# Patient Record
Sex: MALE | Race: WHITE | NOT HISPANIC OR LATINO | Employment: OTHER | URBAN - METROPOLITAN AREA
[De-identification: names, ages, dates, MRNs, and addresses within clinical notes are randomized per-mention and may not be internally consistent; named-entity substitution may affect disease eponyms.]

---

## 2022-12-29 ENCOUNTER — APPOINTMENT (EMERGENCY)
Dept: RADIOLOGY | Facility: HOSPITAL | Age: 82
End: 2022-12-29

## 2022-12-29 ENCOUNTER — APPOINTMENT (INPATIENT)
Dept: RADIOLOGY | Facility: HOSPITAL | Age: 82
End: 2022-12-29

## 2022-12-29 ENCOUNTER — HOSPITAL ENCOUNTER (INPATIENT)
Facility: HOSPITAL | Age: 82
LOS: 5 days | Discharge: NURSING, REHAB OR LONG-TERM CARE | End: 2023-01-03
Attending: EMERGENCY MEDICINE | Admitting: INTERNAL MEDICINE

## 2022-12-29 DIAGNOSIS — L03.90 CELLULITIS: Primary | ICD-10-CM

## 2022-12-29 DIAGNOSIS — R26.9 GAIT DIFFICULTY: ICD-10-CM

## 2022-12-29 PROBLEM — N40.0 BPH (BENIGN PROSTATIC HYPERPLASIA): Status: ACTIVE | Noted: 2022-12-29

## 2022-12-29 PROBLEM — Z79.4 TYPE 2 DIABETES MELLITUS, WITH LONG-TERM CURRENT USE OF INSULIN (HCC): Status: ACTIVE | Noted: 2022-12-29

## 2022-12-29 PROBLEM — E11.9 TYPE 2 DIABETES MELLITUS, WITH LONG-TERM CURRENT USE OF INSULIN (HCC): Status: ACTIVE | Noted: 2022-12-29

## 2022-12-29 PROBLEM — I10 HTN (HYPERTENSION): Status: ACTIVE | Noted: 2022-12-29

## 2022-12-29 PROBLEM — E78.5 DYSLIPIDEMIA: Status: ACTIVE | Noted: 2022-12-29

## 2022-12-29 PROBLEM — J98.4 CHRONIC LUNG DISEASE: Status: ACTIVE | Noted: 2022-12-29

## 2022-12-29 PROBLEM — I25.10 CAD (CORONARY ARTERY DISEASE): Status: ACTIVE | Noted: 2022-12-29

## 2022-12-29 PROBLEM — N18.9 CKD (CHRONIC KIDNEY DISEASE): Status: ACTIVE | Noted: 2022-12-29

## 2022-12-29 LAB
ALBUMIN SERPL BCP-MCNC: 3.4 G/DL (ref 3.5–5)
ALP SERPL-CCNC: 135 U/L (ref 46–116)
ALT SERPL W P-5'-P-CCNC: 17 U/L (ref 12–78)
ANION GAP SERPL CALCULATED.3IONS-SCNC: 11 MMOL/L (ref 4–13)
AST SERPL W P-5'-P-CCNC: 16 U/L (ref 5–45)
BACTERIA UR QL AUTO: NORMAL /HPF
BASOPHILS # BLD AUTO: 0.03 THOUSANDS/ÂΜL (ref 0–0.1)
BASOPHILS NFR BLD AUTO: 0 % (ref 0–1)
BILIRUB SERPL-MCNC: 0.92 MG/DL (ref 0.2–1)
BILIRUB UR QL STRIP: NEGATIVE
BUN SERPL-MCNC: 31 MG/DL (ref 5–25)
CALCIUM ALBUM COR SERPL-MCNC: 10 MG/DL (ref 8.3–10.1)
CALCIUM SERPL-MCNC: 9.5 MG/DL (ref 8.3–10.1)
CHLORIDE SERPL-SCNC: 101 MMOL/L (ref 96–108)
CLARITY UR: CLEAR
CO2 SERPL-SCNC: 28 MMOL/L (ref 21–32)
COLOR UR: YELLOW
CREAT SERPL-MCNC: 2.01 MG/DL (ref 0.6–1.3)
CRP SERPL QL: 151.3 MG/L
EOSINOPHIL # BLD AUTO: 0.1 THOUSAND/ÂΜL (ref 0–0.61)
EOSINOPHIL NFR BLD AUTO: 1 % (ref 0–6)
ERYTHROCYTE [DISTWIDTH] IN BLOOD BY AUTOMATED COUNT: 13.4 % (ref 11.6–15.1)
GFR SERPL CREATININE-BSD FRML MDRD: 30 ML/MIN/1.73SQ M
GLUCOSE SERPL-MCNC: 134 MG/DL (ref 65–140)
GLUCOSE SERPL-MCNC: 189 MG/DL (ref 65–140)
GLUCOSE SERPL-MCNC: 234 MG/DL (ref 65–140)
GLUCOSE UR STRIP-MCNC: NEGATIVE MG/DL
HCT VFR BLD AUTO: 47.5 % (ref 36.5–49.3)
HGB BLD-MCNC: 15.2 G/DL (ref 12–17)
HGB UR QL STRIP.AUTO: NEGATIVE
IMM GRANULOCYTES # BLD AUTO: 0.05 THOUSAND/UL (ref 0–0.2)
IMM GRANULOCYTES NFR BLD AUTO: 1 % (ref 0–2)
KETONES UR STRIP-MCNC: NEGATIVE MG/DL
LACTATE SERPL-SCNC: 1.9 MMOL/L (ref 0.5–2)
LEUKOCYTE ESTERASE UR QL STRIP: NEGATIVE
LYMPHOCYTES # BLD AUTO: 0.94 THOUSANDS/ÂΜL (ref 0.6–4.47)
LYMPHOCYTES NFR BLD AUTO: 9 % (ref 14–44)
MCH RBC QN AUTO: 28.6 PG (ref 26.8–34.3)
MCHC RBC AUTO-ENTMCNC: 32 G/DL (ref 31.4–37.4)
MCV RBC AUTO: 89 FL (ref 82–98)
MONOCYTES # BLD AUTO: 0.85 THOUSAND/ÂΜL (ref 0.17–1.22)
MONOCYTES NFR BLD AUTO: 8 % (ref 4–12)
NEUTROPHILS # BLD AUTO: 8.83 THOUSANDS/ÂΜL (ref 1.85–7.62)
NEUTS SEG NFR BLD AUTO: 81 % (ref 43–75)
NITRITE UR QL STRIP: NEGATIVE
NON-SQ EPI CELLS URNS QL MICRO: NORMAL /HPF
NRBC BLD AUTO-RTO: 0 /100 WBCS
PH UR STRIP.AUTO: 6 [PH]
PLATELET # BLD AUTO: 262 THOUSANDS/UL (ref 149–390)
PMV BLD AUTO: 11.2 FL (ref 8.9–12.7)
POTASSIUM SERPL-SCNC: 4.1 MMOL/L (ref 3.5–5.3)
PROT SERPL-MCNC: 8.7 G/DL (ref 6.4–8.4)
PROT UR STRIP-MCNC: ABNORMAL MG/DL
RBC # BLD AUTO: 5.32 MILLION/UL (ref 3.88–5.62)
RBC #/AREA URNS AUTO: NORMAL /HPF
SARS-COV-2 RNA RESP QL NAA+PROBE: NEGATIVE
SODIUM SERPL-SCNC: 140 MMOL/L (ref 135–147)
SP GR UR STRIP.AUTO: 1.02 (ref 1–1.03)
URATE SERPL-MCNC: 7.3 MG/DL (ref 3.5–8.5)
UROBILINOGEN UR QL STRIP.AUTO: 0.2 E.U./DL
WBC # BLD AUTO: 10.8 THOUSAND/UL (ref 4.31–10.16)
WBC #/AREA URNS AUTO: NORMAL /HPF

## 2022-12-29 RX ORDER — METOPROLOL SUCCINATE 50 MG/1
50 TABLET, EXTENDED RELEASE ORAL DAILY
COMMUNITY

## 2022-12-29 RX ORDER — CEPHALEXIN 500 MG/1
500 CAPSULE ORAL EVERY 8 HOURS SCHEDULED
Qty: 30 CAPSULE | Refills: 0 | Status: SHIPPED | OUTPATIENT
Start: 2022-12-29 | End: 2023-01-08

## 2022-12-29 RX ORDER — LANOLIN ALCOHOL/MO/W.PET/CERES
1000 CREAM (GRAM) TOPICAL DAILY
COMMUNITY

## 2022-12-29 RX ORDER — ACETAMINOPHEN 325 MG/1
650 TABLET ORAL EVERY 6 HOURS PRN
Status: DISCONTINUED | OUTPATIENT
Start: 2022-12-29 | End: 2023-01-03 | Stop reason: HOSPADM

## 2022-12-29 RX ORDER — INSULIN LISPRO 100 [IU]/ML
1-5 INJECTION, SOLUTION INTRAVENOUS; SUBCUTANEOUS
Status: DISCONTINUED | OUTPATIENT
Start: 2022-12-29 | End: 2023-01-03 | Stop reason: HOSPADM

## 2022-12-29 RX ORDER — INSULIN GLARGINE 100 [IU]/ML
20 INJECTION, SOLUTION SUBCUTANEOUS
Status: DISCONTINUED | OUTPATIENT
Start: 2022-12-29 | End: 2023-01-03 | Stop reason: HOSPADM

## 2022-12-29 RX ORDER — TAMSULOSIN HYDROCHLORIDE 0.4 MG/1
0.4 CAPSULE ORAL
COMMUNITY

## 2022-12-29 RX ORDER — METOPROLOL SUCCINATE 50 MG/1
50 TABLET, EXTENDED RELEASE ORAL DAILY
Status: DISCONTINUED | OUTPATIENT
Start: 2022-12-30 | End: 2023-01-03 | Stop reason: HOSPADM

## 2022-12-29 RX ORDER — MELATONIN
1000 DAILY
Status: DISCONTINUED | OUTPATIENT
Start: 2022-12-30 | End: 2023-01-03 | Stop reason: HOSPADM

## 2022-12-29 RX ORDER — UREA 10 %
500 LOTION (ML) TOPICAL DAILY
COMMUNITY

## 2022-12-29 RX ORDER — ASPIRIN 81 MG/1
81 TABLET, CHEWABLE ORAL DAILY
Status: DISCONTINUED | OUTPATIENT
Start: 2022-12-30 | End: 2023-01-03 | Stop reason: HOSPADM

## 2022-12-29 RX ORDER — LISINOPRIL 5 MG/1
5 TABLET ORAL DAILY
COMMUNITY

## 2022-12-29 RX ORDER — AMLODIPINE BESYLATE 10 MG/1
10 TABLET ORAL DAILY
COMMUNITY

## 2022-12-29 RX ORDER — CEFAZOLIN SODIUM 1 G/50ML
1000 SOLUTION INTRAVENOUS EVERY 12 HOURS
Status: DISCONTINUED | OUTPATIENT
Start: 2022-12-29 | End: 2022-12-30

## 2022-12-29 RX ORDER — TAMSULOSIN HYDROCHLORIDE 0.4 MG/1
0.4 CAPSULE ORAL
Status: DISCONTINUED | OUTPATIENT
Start: 2022-12-29 | End: 2023-01-03 | Stop reason: HOSPADM

## 2022-12-29 RX ORDER — CHLORAL HYDRATE 500 MG
1000 CAPSULE ORAL 2 TIMES DAILY
Status: DISCONTINUED | OUTPATIENT
Start: 2022-12-29 | End: 2023-01-03 | Stop reason: HOSPADM

## 2022-12-29 RX ORDER — OMEGA-3-ACID ETHYL ESTERS 1 G/1
1 CAPSULE, LIQUID FILLED ORAL 2 TIMES DAILY
COMMUNITY

## 2022-12-29 RX ORDER — CEFTRIAXONE 1 G/50ML
1000 INJECTION, SOLUTION INTRAVENOUS ONCE
Status: COMPLETED | OUTPATIENT
Start: 2022-12-29 | End: 2022-12-29

## 2022-12-29 RX ORDER — AMLODIPINE BESYLATE 10 MG/1
10 TABLET ORAL DAILY
Status: DISCONTINUED | OUTPATIENT
Start: 2022-12-30 | End: 2023-01-03 | Stop reason: HOSPADM

## 2022-12-29 RX ORDER — CLOBETASOL PROPIONATE 0.05 MG/G
GEL TOPICAL DAILY
COMMUNITY

## 2022-12-29 RX ORDER — CLOBETASOL PROPIONATE 0.5 MG/G
CREAM TOPICAL 2 TIMES DAILY
Status: DISCONTINUED | OUTPATIENT
Start: 2022-12-29 | End: 2023-01-03 | Stop reason: HOSPADM

## 2022-12-29 RX ORDER — ASPIRIN 81 MG/1
81 TABLET, CHEWABLE ORAL DAILY
COMMUNITY

## 2022-12-29 RX ORDER — MELATONIN
1000 DAILY
COMMUNITY

## 2022-12-29 RX ORDER — UREA 10 %
500 LOTION (ML) TOPICAL DAILY
Status: DISCONTINUED | OUTPATIENT
Start: 2022-12-29 | End: 2023-01-03 | Stop reason: HOSPADM

## 2022-12-29 RX ORDER — LISINOPRIL 5 MG/1
5 TABLET ORAL DAILY
Status: DISCONTINUED | OUTPATIENT
Start: 2022-12-30 | End: 2023-01-03 | Stop reason: HOSPADM

## 2022-12-29 RX ORDER — ENOXAPARIN SODIUM 100 MG/ML
30 INJECTION SUBCUTANEOUS DAILY
Status: DISCONTINUED | OUTPATIENT
Start: 2022-12-30 | End: 2023-01-03 | Stop reason: HOSPADM

## 2022-12-29 RX ADMIN — TAMSULOSIN HYDROCHLORIDE 0.4 MG: 0.4 CAPSULE ORAL at 18:27

## 2022-12-29 RX ADMIN — CEFTRIAXONE 1000 MG: 1 INJECTION, SOLUTION INTRAVENOUS at 08:38

## 2022-12-29 RX ADMIN — CLOBETASOL PROPIONATE: 0.5 CREAM TOPICAL at 18:31

## 2022-12-29 RX ADMIN — INSULIN GLARGINE 20 UNITS: 100 INJECTION, SOLUTION SUBCUTANEOUS at 21:42

## 2022-12-29 RX ADMIN — INSULIN LISPRO 1 UNITS: 100 INJECTION, SOLUTION INTRAVENOUS; SUBCUTANEOUS at 18:26

## 2022-12-29 RX ADMIN — SODIUM CHLORIDE 1000 ML: 0.9 INJECTION, SOLUTION INTRAVENOUS at 08:36

## 2022-12-29 RX ADMIN — CEFAZOLIN SODIUM 1000 MG: 1 SOLUTION INTRAVENOUS at 18:28

## 2022-12-29 RX ADMIN — OMEGA-3 FATTY ACIDS CAP 1000 MG 1000 MG: 1000 CAP at 18:27

## 2022-12-29 NOTE — ASSESSMENT & PLAN NOTE
Lab Results   Component Value Date    HGBA1C 7 0 (H) 12/30/2022       Recent Labs     01/01/23  1103 01/01/23  1555 01/01/23 2057 01/02/23  0711   POCGLU 129 138 147* 114       Blood Sugar Average: Last 72 hrs:  (P) 543 [id number]     Continue home regimen

## 2022-12-29 NOTE — ASSESSMENT & PLAN NOTE
Elevated on admission, wife reports poor compliance to antihypertensive  Currently overall stable on current meds  · Continue amlodipine, lisinopril, metoprolol with holding parameters  · Monitor blood pressure

## 2022-12-29 NOTE — ED PROVIDER NOTES
History  Chief Complaint   Patient presents with   • Foot Pain     Pt reports of l foot pain      Patient states his wife called the ambulance this morning but is not entirely sure why  He arrives awake and alert complaining of pain in the left foot  Denies any injury  Examination of the foot shows warm tender erythematous changes extending from the foot to snf up the leg  Patient denies fever or chills, body aches or vomiting  Prior to Admission Medications   Prescriptions Last Dose Informant Patient Reported? Taking? amLODIPine (NORVASC) 10 mg tablet Past Week  Yes Yes   Sig: Take 10 mg by mouth daily One per day  aspirin 81 mg chewable tablet Past Week  Yes Yes   Sig: Chew 81 mg daily   cholecalciferol (VITAMIN D3) 1,000 units tablet Past Week  Yes Yes   Sig: Take 1,000 Units by mouth daily   clobetasol (TEMOVATE) 0 05 % GEL Past Month  Yes Yes   Sig: Apply topically in the morning   insulin glargine (TOUJEO) 300 units/mL CONCENTRATED U-300 injection pen (1-unit dial) 12/29/2022  Yes Yes   Sig: Inject 36 Units under the skin daily   linaGLIPtin 5 MG TABS Past Week  Yes Yes   Sig: Take 5 mg by mouth daily   lisinopril (ZESTRIL) 5 mg tablet Past Week  Yes Yes   Sig: Take 5 mg by mouth daily   magnesium gluconate (MAGONATE) 500 mg tablet Past Week  Yes Yes   Sig: Take 500 mg by mouth in the morning   metoprolol succinate (TOPROL-XL) 50 mg 24 hr tablet Past Week  Yes Yes   Sig: Take 50 mg by mouth daily Two per day   omega-3-acid ethyl esters (LOVAZA) 1 g capsule Past Week  Yes Yes   Sig: Take 1 g by mouth 2 (two) times a day   tamsulosin (FLOMAX) 0 4 mg Past Week  Yes Yes   Sig: Take 0 4 mg by mouth daily with dinner   vitamin B-12 (VITAMIN B-12) 1,000 mcg tablet Past Week  Yes Yes   Sig: Take 1,000 mcg by mouth daily      Facility-Administered Medications: None       No past medical history on file  No past surgical history on file  No family history on file    I have reviewed and agree with the history as documented  E-Cigarette/Vaping   • E-Cigarette Use Never User      E-Cigarette/Vaping Substances   • Nicotine No    • THC No    • CBD No    • Flavoring No    • Other No    • Unknown No      Social History     Tobacco Use   • Smoking status: Unknown   Vaping Use   • Vaping Use: Never used   Substance Use Topics   • Alcohol use: Not Currently   • Drug use: Never       Review of Systems   Constitutional: Negative for chills and fever  HENT: Negative for congestion and sore throat  Eyes: Negative for visual disturbance  Respiratory: Negative for cough and shortness of breath  Cardiovascular: Negative for chest pain  Gastrointestinal: Negative for abdominal pain and vomiting  Genitourinary: Negative for dysuria  Musculoskeletal: Positive for arthralgias  Skin: Positive for color change and rash  Negative for wound  Neurological: Negative for weakness and numbness  Hematological: Does not bruise/bleed easily  Psychiatric/Behavioral: Negative for behavioral problems  All other systems reviewed and are negative  Physical Exam  Physical Exam  Vitals and nursing note reviewed  Constitutional:       Appearance: Normal appearance  HENT:      Head: Normocephalic  Right Ear: External ear normal       Left Ear: External ear normal       Nose: Nose normal       Mouth/Throat:      Pharynx: Oropharynx is clear  Eyes:      Conjunctiva/sclera: Conjunctivae normal    Cardiovascular:      Rate and Rhythm: Normal rate and regular rhythm  Pulses: Normal pulses  Pulmonary:      Effort: Pulmonary effort is normal       Breath sounds: Normal breath sounds  Abdominal:      General: Bowel sounds are normal       Palpations: Abdomen is soft  Musculoskeletal:         General: Tenderness present  Normal range of motion  Cervical back: Normal range of motion  Skin:     General: Skin is warm and dry  Capillary Refill: Capillary refill takes less than 2 seconds  Findings: Erythema present  Comments: Cellulitis and swelling of the left foot  Erythematous warm to the touch and tender  He has poor foot care but no open wounds   Neurological:      General: No focal deficit present  Mental Status: He is alert     Psychiatric:         Mood and Affect: Mood normal          Vital Signs  ED Triage Vitals   Temperature Pulse Respirations Blood Pressure SpO2   12/29/22 0828 12/29/22 0828 12/29/22 0828 12/29/22 0828 12/29/22 0828   (!) 97 3 °F (36 3 °C) 77 20 163/99 95 %      Temp Source Heart Rate Source Patient Position - Orthostatic VS BP Location FiO2 (%)   12/29/22 0828 12/29/22 0828 12/29/22 0828 12/29/22 0828 --   Tympanic Monitor Sitting Left arm       Pain Score       12/29/22 1700       No Pain           Vitals:    12/30/22 1433 12/30/22 1502 12/30/22 1930 12/31/22 0710   BP: 97/68 120/80 124/77 160/89   Pulse: 64 58 95 63   Patient Position - Orthostatic VS: Lying   Lying         Visual Acuity      ED Medications  Medications   enoxaparin (LOVENOX) subcutaneous injection 30 mg (30 mg Subcutaneous Given 12/30/22 0856)   acetaminophen (TYLENOL) tablet 650 mg (has no administration in time range)   insulin lispro (HumaLOG) 100 units/mL subcutaneous injection 1-5 Units (1 Units Subcutaneous Not Given 12/31/22 0725)   insulin lispro (HumaLOG) 100 units/mL subcutaneous injection 1-5 Units (2 Units Subcutaneous Given 12/30/22 2132)   amLODIPine (NORVASC) tablet 10 mg (10 mg Oral Given 12/30/22 0855)   aspirin chewable tablet 81 mg (81 mg Oral Given 12/30/22 0855)   cholecalciferol (VITAMIN D3) tablet 1,000 Units (1,000 Units Oral Given 12/30/22 0854)   clobetasol (TEMOVATE) 0 05 % cream ( Topical Given 12/30/22 1703)   insulin glargine (LANTUS) subcutaneous injection 20 Units 0 2 mL (20 Units Subcutaneous Given 12/30/22 2132)   sitaGLIPtin (JANUVIA) tablet 25 mg (25 mg Oral Given 12/30/22 0854)   lisinopril (ZESTRIL) tablet 5 mg (5 mg Oral Given 12/30/22 5747) magnesium gluconate (MAGONATE) tablet 500 mg (500 mg Oral Given 12/30/22 0855)   metoprolol succinate (TOPROL-XL) 24 hr tablet 50 mg (50 mg Oral Given 12/30/22 0855)   fish oil capsule 1,000 mg (1,000 mg Oral Given 12/30/22 1703)   tamsulosin (FLOMAX) capsule 0 4 mg (0 4 mg Oral Given 12/30/22 1606)   cyanocobalamin (VITAMIN B-12) tablet 1,000 mcg (1,000 mcg Oral Given 12/30/22 0855)   ceFAZolin (ANCEF) IVPB (premix in dextrose) 1,000 mg 50 mL (1,000 mg Intravenous New Bag 12/30/22 2351)   sodium chloride 0 9 % bolus 1,000 mL (0 mL Intravenous Stopped 12/29/22 1026)   cefTRIAXone (ROCEPHIN) IVPB (premix in dextrose) 1,000 mg 50 mL (0 mg Intravenous Stopped 12/29/22 0910)   diphenhydrAMINE (BENADRYL) injection 25 mg (25 mg Intravenous Given 12/30/22 0135)   colchicine (COLCRYS) tablet 1 2 mg (1 2 mg Oral Given 12/30/22 1606)       Diagnostic Studies  Results Reviewed     Procedure Component Value Units Date/Time    Blood culture #1 [[id number]] Collected: 12/29/22 0822    Lab Status: Preliminary result Specimen: Blood from Arm, Right Updated: 12/30/22 1401     Blood Culture No Growth at 24 hrs  Blood culture #2 [[id number]] Collected: 12/29/22 0835    Lab Status: Preliminary result Specimen: Blood from Arm, Left Updated: 12/30/22 1401     Blood Culture No Growth at 24 hrs      C-reactive protein [[id number]]  (Abnormal) Collected: 12/29/22 0822    Lab Status: Final result Specimen: Blood from Arm, Right Updated: 12/29/22 1459      3 mg/L     Uric acid [[id number]]  (Normal) Collected: 12/29/22 2610    Lab Status: Final result Specimen: Blood from Arm, Right Updated: 12/29/22 1459     Uric Acid 7 3 mg/dL     Urine Microscopic [[id number]]  (Normal) Collected: 12/29/22 1028    Lab Status: Final result Specimen: Urine, Clean Catch Updated: 12/29/22 1057     RBC, UA None Seen /hpf      WBC, UA 0-1 /hpf      Epithelial Cells None Seen /hpf      Bacteria, UA None Seen /hpf     UA (URINE) with reflex to Scope [[id number]]  (Abnormal) Collected: 12/29/22 1028    Lab Status: Final result Specimen: Urine, Clean Catch Updated: 12/29/22 1040     Color, UA Yellow     Clarity, UA Clear     Specific Lenora, UA 1 020     pH, UA 6 0     Leukocytes, UA Negative     Nitrite, UA Negative     Protein, UA Trace mg/dl      Glucose, UA Negative mg/dl      Ketones, UA Negative mg/dl      Urobilinogen, UA 0 2 E U /dl      Bilirubin, UA Negative     Occult Blood, UA Negative    Lactic acid [[id number]]  (Normal) Collected: 12/29/22 9020    Lab Status: Final result Specimen: Blood from Arm, Right Updated: 12/29/22 0853     LACTIC ACID 1 9 mmol/L     Narrative:      Result may be elevated if tourniquet was used during collection      Comprehensive metabolic panel [[id number]]  (Abnormal) Collected: 12/29/22 0822    Lab Status: Final result Specimen: Blood from Arm, Right Updated: 12/29/22 0850     Sodium 140 mmol/L      Potassium 4 1 mmol/L      Chloride 101 mmol/L      CO2 28 mmol/L      ANION GAP 11 mmol/L      BUN 31 mg/dL      Creatinine 2 01 mg/dL      Glucose 234 mg/dL      Calcium 9 5 mg/dL      Corrected Calcium 10 0 mg/dL      AST 16 U/L      ALT 17 U/L      Alkaline Phosphatase 135 U/L      Total Protein 8 7 g/dL      Albumin 3 4 g/dL      Total Bilirubin 0 92 mg/dL      eGFR 30 ml/min/1 73sq m     Narrative:      [place] guidelines for Chronic Kidney Disease (CKD):   •  Stage 1 with normal or high GFR (GFR > 90 mL/min/1 73 square meters)  •  Stage 2 Mild CKD (GFR = 60-89 mL/min/1 73 square meters)  •  Stage 3A Moderate CKD (GFR = 45-59 mL/min/1 73 square meters)  •  Stage 3B Moderate CKD (GFR = 30-44 mL/min/1 73 square meters)  •  Stage 4 Severe CKD (GFR = 15-29 mL/min/1 73 square meters)  •  Stage 5 End Stage CKD (GFR <15 mL/min/1 73 square meters)  Note: GFR calculation is accurate only with a steady state creatinine    CBC and differential [[id number]]  (Abnormal) Collected: 12/29/22 0822    Lab Status: Final result Specimen: Blood from Arm, Right Updated: 12/29/22 0830     WBC 10 80 Thousand/uL      RBC 5 32 Million/uL      Hemoglobin 15 2 g/dL      Hematocrit 47 5 %      MCV 89 fL      MCH 28 6 pg      MCHC 32 0 g/dL      RDW 13 4 %      MPV 11 2 fL      Platelets 613 Thousands/uL      nRBC 0 /100 WBCs      Neutrophils Relative 81 %      Immat GRANS % 1 %      Lymphocytes Relative 9 %      Monocytes Relative 8 %      Eosinophils Relative 1 %      Basophils Relative 0 %      Neutrophils Absolute 8 83 Thousands/µL      Immature Grans Absolute 0 05 Thousand/uL      Lymphocytes Absolute 0 94 Thousands/µL      Monocytes Absolute 0 85 Thousand/µL      Eosinophils Absolute 0 10 Thousand/µL      Basophils Absolute 0 03 Thousands/µL                  XR foot 3+ views LEFT   Final Result by Mary Worley MD (12/29 2203)      No acute osseous abnormality  Mild first metatarsophalangeal joint osteoarthritis  Workstation performed: [id number]         XR chest 1 view portable   Final Result by Karon Peña MD (12/29 8730)      Diffuse bilateral pulmonary parenchymal opacity  If this is acute, it could be due to edema or pneumonia  If it is chronic, it could be due to pulmonary fibrosis                    Workstation performed: [id number]         MRI ankle/heel left wo contrast    (Results Pending)              Procedures  ECG 12 Lead Documentation Only    Date/Time: 12/29/2022 9:12 AM  Performed by: Giselle Aguilar MD  Authorized by: Giselle Aguilar MD     Indications / Diagnosis:  Possible sepsis  ECG reviewed by me, the ED Provider: yes    Patient location:  ED  Interpretation:     Interpretation: abnormal    Rate:     ECG rate:  92    ECG rate assessment: normal    Rhythm:     Rhythm: sinus rhythm    Ectopy:     Ectopy: PVCs      PVCs:  Frequent  QRS:     QRS axis:  Normal    QRS intervals:  Normal  Conduction:     Conduction: normal    ST segments:     ST segments:  Non-specific  T waves:     T waves: normal               ED Course                                             MDM  Number of Diagnoses or Management Options  Diagnosis management comments: Cellulitis  Check sepsis profile      Disposition  Final diagnoses:   Cellulitis   Gait difficulty     Time reflects when diagnosis was documented in both MDM as applicable and the Disposition within this note     Time User Action Codes Description Comment    12/29/2022 10:59 AM Stephani ARREGUIN Add [L03 90] Cellulitis     12/29/2022  1:12 PM David Has Add [R26 9] Gait difficulty       ED Disposition     ED Disposition   Admit    Condition   Stable    Date/Time   u Dec 29, 2022  1:14 PM    Comment   Case was discussed with hospitalist and the patient's admission status was agreed to be Admission Status: inpatient status to the service of Dr Lila Law  Follow-up Information     Follow up With Specialties Details Why [address] [facility] Medicine Schedule an appointment as soon as possible for a visit   [facility] [phone]            Current Discharge Medication List      START taking these medications    Details   cephalexin (KEFLEX) 500 mg capsule Take 1 capsule (500 mg total) by mouth every 8 (eight) hours for 10 days  Qty: 30 capsule, Refills: 0    Associated Diagnoses: Cellulitis         CONTINUE these medications which have NOT CHANGED    Details   amLODIPine (NORVASC) 10 mg tablet Take 10 mg by mouth daily One per day        aspirin 81 mg chewable tablet Chew 81 mg daily      cholecalciferol (VITAMIN D3) 1,000 units tablet Take 1,000 Units by mouth daily      clobetasol (TEMOVATE) 0 05 % GEL Apply topically in the morning      insulin glargine (TOUJEO) 300 units/mL CONCENTRATED U-300 injection pen (1-unit dial) Inject 36 Units under the skin daily      linaGLIPtin 5 MG TABS Take 5 mg by mouth daily      lisinopril (ZESTRIL) 5 mg tablet Take 5 mg by mouth daily      magnesium gluconate (MAGONATE) 500 mg tablet Take 500 mg by mouth in the morning      metoprolol succinate (TOPROL-XL) 50 mg 24 hr tablet Take 50 mg by mouth daily Two per day      omega-3-acid ethyl esters (LOVAZA) 1 g capsule Take 1 g by mouth 2 (two) times a day      tamsulosin (FLOMAX) 0 4 mg Take 0 4 mg by mouth daily with dinner      vitamin B-12 (VITAMIN B-12) 1,000 mcg tablet Take 1,000 mcg by mouth daily             No discharge procedures on file      PDMP Review     None          ED Provider  Electronically Signed by           Genevieve Macias MD  12/31/22 3926

## 2022-12-29 NOTE — ASSESSMENT & PLAN NOTE
History of CAD s/p LAD PCI with MCKENZIE in 2018  EKG sinus rhythm with PVCs  · Continue aspirin, metoprolol, fish oil  · Follow-up with primary cardiology after discharge

## 2022-12-29 NOTE — ASSESSMENT & PLAN NOTE
Lab Results   Component Value Date    EGFR 39 01/02/2023    EGFR 34 01/01/2023    EGFR 41 12/31/2022    CREATININE 1 60 (H) 01/02/2023    CREATININE 1 81 (H) 01/01/2023    CREATININE 1 54 (H) 12/31/2022     Unknown baseline but creatinine was around 1 5-1 8 in 2018, presented with creatinine of 2 0  UA unremarkable except trace protein  Creatinine now remains at baseline  · Continue to monitor

## 2022-12-29 NOTE — H&P
History and Physical - [facility]    Patient Information: Mira Candelario 80 y o  male MRN: [id number]  Unit/Bed#: [address] [place] [address] Encounter: [id number]  Admitting Physician: Paige Russell MD  PCP: Bony Watts  Date of Admission:  12/29/22        Hospital Problem List:     Principal Problem:    Cellulitis  Active Problems:    CAD (coronary artery disease)    CKD (chronic kidney disease)    Type 2 diabetes mellitus, with long-term current use of insulin (HCC)    HTN (hypertension)    BPH (benign prostatic hyperplasia)    Dyslipidemia    Chronic lung disease      Assessment/Plan:    * Cellulitis  Assessment & Plan  Presented with 2-day history of worsening of left foot and ankle swelling erythema and pain  Reported fever at home, afebrile present  Mild leukocytosis on presentation  Denies any history of gout    Concerns of inflammatory arthritis/ pseudogout given pain with range of motion, doubt septic arthritis  · IV cefazolin  · Check MRSA surveillance  · Check CRP, uric acid  · Follow-up x-ray of the foot  · Monitor clinically  · Consider steroid if no improvement  · Podiatry evaluation  · PT/OT    HTN (hypertension)  Assessment & Plan  Elevated, wife reports poor compliance to antihypertensive  · Resume amlodipine, lisinopril, metoprolol  · Monitor blood pressure     Type 2 diabetes mellitus, with long-term current use of insulin (HCC)  Assessment & Plan  No results found for: HGBA1C    Recent Labs     12/29/22  1650   POCGLU 189*       Blood Sugar Average: Last 72 hrs:  (P) 189     Check hemoglobin A1c, monitor blood glucose trend  Continue glargine but will reduce to 20 units nightly  Substitute Tradjenta to Januvia 25 mg daily  Insulin sliding scale    CKD (chronic kidney disease)  Assessment & Plan  Lab Results   Component Value Date    EGFR 30 12/29/2022    CREATININE 2 01 (H) 12/29/2022     Unknown baseline but creatinine was around 1 5-1 8 in 2018  UA unremarkable except trace protein  · We will continue to monitor  · Will obtain [facility]    CAD (coronary artery disease)  Assessment & Plan  History of CAD s/p LAD PCI with MCKENZIE in 2018  · Continue aspirin, metoprolol, fish oil      Chronic lung disease  Assessment & Plan  Reports history of chronic lung disease, unspecified  Details unknown  Chest x-ray-diffuse bilateral pulmonary parenchymal opacities  Denies any reports chronic shortness of breath, denies any acute symptoms  SARS-CoV-2 PCR negative  · Monitor    Dyslipidemia  Assessment & Plan  On fish oil    BPH (benign prostatic hyperplasia)  Assessment & Plan  On tamsulosin          VTE Prophylaxis: Enoxaparin (Lovenox)   Code Status: Level 1 - Full Code    Anticipated Length of Stay:  Patient will be admitted on an Inpatient basis with an anticipated length of stay of  >2 midnights  Justification for Hospital Stay: Left foot pain and swelling    Total Time for Visit, including Counseling / Coordination of Care: 45 minutes  Greater than 50% of this total time spent on direct patient counseling and coordination of care  Patient's wife at bedside    Chief Complaint:     Foot Pain (Pt reports of l foot pain )    History of Present Illness:    Parish Pascual is a 80 y o  male with history of diabetes mellitus type II, hypertension, CKD, CAD (s/p LAD PCI with MCKENZIE 2018) who presents with foot ankle and swelling  Patient's symptoms started a day prior with increasing swelling and pain today he was noticed to have worsening of symptoms with erythema and inability to walk hence he p was brought to ED for further evaluation  She was afebrile in ED but wife reported having fever at home  Other vital signs were stable blood pressure was elevated  Labs revealed leukocytosis hyperglycemia  Patient received IV antibiotics and was subsequently admitted  Patient/wife denies any similar complaint or injury in the past though patient does have history of frequent falls and poor mobility    Denies any fall recently  Wife also reports poor compliance with the medication   Review of Systems:    Review of Systems   Constitutional: Positive for fever  Negative for activity change and appetite change  HENT: Negative for sore throat and trouble swallowing  Respiratory: Positive for shortness of breath (Chronic)  Negative for cough and chest tightness  Cardiovascular: Negative for chest pain and leg swelling (Left ankle swelling)  Gastrointestinal: Negative for abdominal pain, diarrhea, nausea and vomiting  Genitourinary: Negative for difficulty urinating and dysuria  Musculoskeletal: Positive for arthralgias and gait problem  Skin: Positive for color change  Neurological: Positive for dizziness (Vertigo)  Negative for syncope  Psychiatric/Behavioral: Negative for behavioral problems  Past Medical and Surgical History:      diabetes mellitus type II, hypertension, CKD, CAD (s/p LAD PCI with MCKENZIE 2018)   Chronic lung disease, dyslipidemia, psoriasis,      Meds/Allergies:    PTA meds:   Prior to Admission Medications   Prescriptions Last Dose Informant Patient Reported? Taking? amLODIPine (NORVASC) 10 mg tablet   Yes Yes   Sig: Take 10 mg by mouth daily One per day     aspirin 81 mg chewable tablet   Yes Yes   Sig: Chew 81 mg daily   cholecalciferol (VITAMIN D3) 1,000 units tablet   Yes Yes   Sig: Take 1,000 Units by mouth daily   clobetasol (TEMOVATE) 0 05 % GEL   Yes Yes   Sig: Apply topically in the morning   insulin glargine (TOUJEO) 300 units/mL CONCENTRATED U-300 injection pen (1-unit dial)   Yes Yes   Sig: Inject 36 Units under the skin daily   linaGLIPtin 5 MG TABS   Yes Yes   Sig: Take 5 mg by mouth daily   lisinopril (ZESTRIL) 5 mg tablet   Yes Yes   Sig: Take 5 mg by mouth daily   magnesium gluconate (MAGONATE) 500 mg tablet   Yes Yes   Sig: Take 500 mg by mouth in the morning   metoprolol succinate (TOPROL-XL) 50 mg 24 hr tablet   Yes Yes   Sig: Take 50 mg by mouth daily Two per day   omega-3-acid ethyl esters (LOVAZA) 1 g capsule   Yes Yes   Sig: Take 1 g by mouth 2 (two) times a day   tamsulosin (FLOMAX) 0 4 mg   Yes Yes   Sig: Take 0 4 mg by mouth daily with dinner   vitamin B-12 (VITAMIN B-12) 1,000 mcg tablet   Yes Yes   Sig: Take 1,000 mcg by mouth daily      Facility-Administered Medications: None       Allergies: Not on File  History:     Marital Status: /Civil Union     Substance Use History:   Social History     Substance and Sexual Activity   Alcohol Use Not on file     Social History     Tobacco Use   Smoking Status Not on file   Smokeless Tobacco Not on file     Social History     Substance and Sexual Activity   Drug Use Not on file       Family History:    No family history on file  Physical Exam:     Vitals:   Blood Pressure: 118/75 (12/29/22 1425)  Pulse: 90 (12/29/22 1425)  Temperature: (!) 97 °F (36 1 °C) (12/29/22 1425)  Temp Source: Tympanic (12/29/22 0828)  Respirations: (!) 24 (12/29/22 1425)  Height: 6' 2" (188 cm) (12/29/22 0840)  Weight - Scale: 85 3 kg (188 lb) (12/29/22 0840)  SpO2: 95 % (12/29/22 1425)    Physical Exam  Constitutional:       General: He is not in acute distress  Comments: Elderly, comfortable, unkempt   HENT:      Head: Normocephalic and atraumatic  Cardiovascular:      Rate and Rhythm: Normal rate  Pulmonary:      Effort: Pulmonary effort is normal  No respiratory distress  Breath sounds: No wheezing, rhonchi or rales  Chest:      Chest wall: No tenderness  Abdominal:      General: Bowel sounds are normal  There is no distension  Palpations: Abdomen is soft  Tenderness: There is no abdominal tenderness  There is no guarding or rebound  Musculoskeletal:         General: Swelling (Left ankle swelling erythema extending proximally to the foot ) and tenderness (Left ankle and foot) present  Right lower leg: No edema  Comments: Painful range of motion at left ankle    Good peripheral pulses   Skin: General: Skin is warm and dry  Findings: No rash  Neurological:      Mental Status: He is alert  Mental status is at baseline  Cranial Nerves: No cranial nerve deficit  Psychiatric:         Behavior: Behavior normal                  Lab Results: I have personally reviewed pertinent reports  Results from last 7 days   Lab Units 12/29/22  0822   WBC Thousand/uL 10 80*   HEMOGLOBIN g/dL 15 2   HEMATOCRIT % 47 5   PLATELETS Thousands/uL 262   NEUTROS PCT % 81*   LYMPHS PCT % 9*   MONOS PCT % 8   EOS PCT % 1     Results from last 7 days   Lab Units 12/29/22  0822   POTASSIUM mmol/L 4 1   CHLORIDE mmol/L 101   CO2 mmol/L 28   BUN mg/dL 31*   CREATININE mg/dL 2 01*   CALCIUM mg/dL 9 5   ALK PHOS U/L 135*   ALT U/L 17   AST U/L 16           Imaging: I have personally reviewed pertinent reports  XR chest 1 view portable    Result Date: 12/29/2022  Narrative: CHEST INDICATION:   Sepsis  COMPARISON:  None EXAM PERFORMED/VIEWS:  XR CHEST PORTABLE FINDINGS: Moderate cardiomegaly  Moderate diffuse bilateral pulmonary parenchymal opacity  No definite effusion  No pneumothorax  Osseous structures appear within normal limits for patient age  Impression: Diffuse bilateral pulmonary parenchymal opacity  If this is acute, it could be due to edema or pneumonia  If it is chronic, it could be due to pulmonary fibrosis  Workstation performed: [id number]       XR chest 1 view portable   Final Result      Diffuse bilateral pulmonary parenchymal opacity  If this is acute, it could be due to edema or pneumonia  If it is chronic, it could be due to pulmonary fibrosis  Workstation performed: [id number]         XR foot 3+ views LEFT    (Results Pending)       EKG, Pathology, and Other Studies Reviewed on Admission:   · EKG is normal sinus rhythm at 92 bpm    Allscripts/EPIC Records Reviewed: Yes     ** Please Note: "This note has been constructed using a voice recognition system  Therefore there may be syntax, spelling, and/or grammatical errors   Please call if you have any questions  "**

## 2022-12-29 NOTE — ASSESSMENT & PLAN NOTE
Presented with 2-day history of worsening of left foot and ankle swelling erythema and pain  Reported fever at home, afebrile present  X-ray of the foot-No acute osseous abnormality  Evidence of first metatarsophalangeal joint osteoarthritis  Mild leukocytosis on presentation  CRP significantly elevated, uric acid normal  Denies any history of gout  Concerns of inflammatory arthritis/ pseudogout given pain with range of motion, doubt septic arthritis  MRI of the foot with nonspecific diffuse subcutaneous edema around the ankle  No evidence of joint effusion to suggest pseudogout or other joint involvement  Incidental moderate-sized Gruberi bursa  Remains afebrile, normal white count  Continues to improve with IV antibiotics patient also had improvement in renal function  and received colchicine x1  · Continue IV cefazolin, will transition to p o  on discharge  · MRSA screen positive but patient is improving with IV cefazolin  · Follow-up blood cultures negative so far  · Monitor clinically  · Podiatry evaluation appreciated, outpatient follow-up  · Podiatry recs: Today stab incision performed at the base of the proximal phalanx in the area of the posterior nail fold third left toe  Small amount of serosanguineous exudate noted  Culture and sensitivity done  At this time it appears the patient had cellulitis of the left foot and ankle secondary to abscess of toe  Start wound care  Continue antibiosis because it appears to be eradicating infection  X-rays ordered to rule out osteomyelitis    · Will follow-up with podiatry outpatient

## 2022-12-29 NOTE — ASSESSMENT & PLAN NOTE
Reports history of chronic lung disease, unspecified  Evidence of pulmonary fibrosis/bronchiectasis based on records from [facility]  Chest x-ray-diffuse bilateral pulmonary parenchymal opacities    Denies any reports chronic shortness of breath, denies any acute symptoms  SARS-CoV-2 PCR negative  · Monitor

## 2022-12-30 LAB
ANION GAP SERPL CALCULATED.3IONS-SCNC: 11 MMOL/L (ref 4–13)
ATRIAL RATE: 92 BPM
BUN SERPL-MCNC: 26 MG/DL (ref 5–25)
CALCIUM SERPL-MCNC: 9.1 MG/DL (ref 8.3–10.1)
CHLORIDE SERPL-SCNC: 103 MMOL/L (ref 96–108)
CO2 SERPL-SCNC: 26 MMOL/L (ref 21–32)
CREAT SERPL-MCNC: 1.43 MG/DL (ref 0.6–1.3)
ERYTHROCYTE [DISTWIDTH] IN BLOOD BY AUTOMATED COUNT: 13.2 % (ref 11.6–15.1)
EST. AVERAGE GLUCOSE BLD GHB EST-MCNC: 154 MG/DL
GFR SERPL CREATININE-BSD FRML MDRD: 45 ML/MIN/1.73SQ M
GLUCOSE SERPL-MCNC: 132 MG/DL (ref 65–140)
GLUCOSE SERPL-MCNC: 135 MG/DL (ref 65–140)
GLUCOSE SERPL-MCNC: 145 MG/DL (ref 65–140)
GLUCOSE SERPL-MCNC: 165 MG/DL (ref 65–140)
GLUCOSE SERPL-MCNC: 249 MG/DL (ref 65–140)
HBA1C MFR BLD: 7 %
HCT VFR BLD AUTO: 42.2 % (ref 36.5–49.3)
HGB BLD-MCNC: 13.8 G/DL (ref 12–17)
MCH RBC QN AUTO: 28.8 PG (ref 26.8–34.3)
MCHC RBC AUTO-ENTMCNC: 32.7 G/DL (ref 31.4–37.4)
MCV RBC AUTO: 88 FL (ref 82–98)
MRSA NOSE QL CULT: ABNORMAL
P AXIS: 39 DEGREES
PLATELET # BLD AUTO: 242 THOUSANDS/UL (ref 149–390)
PMV BLD AUTO: 11.1 FL (ref 8.9–12.7)
POTASSIUM SERPL-SCNC: 3.7 MMOL/L (ref 3.5–5.3)
PR INTERVAL: 164 MS
QRS AXIS: -2 DEGREES
QRSD INTERVAL: 86 MS
QT INTERVAL: 354 MS
QTC INTERVAL: 437 MS
RBC # BLD AUTO: 4.8 MILLION/UL (ref 3.88–5.62)
SODIUM SERPL-SCNC: 140 MMOL/L (ref 135–147)
T WAVE AXIS: 34 DEGREES
VENTRICULAR RATE: 92 BPM
WBC # BLD AUTO: 9.63 THOUSAND/UL (ref 4.31–10.16)

## 2022-12-30 RX ORDER — COLCHICINE 0.6 MG/1
1.2 TABLET ORAL ONCE
Status: COMPLETED | OUTPATIENT
Start: 2022-12-30 | End: 2022-12-30

## 2022-12-30 RX ORDER — DIPHENHYDRAMINE HYDROCHLORIDE 50 MG/ML
25 INJECTION INTRAMUSCULAR; INTRAVENOUS ONCE
Status: COMPLETED | OUTPATIENT
Start: 2022-12-30 | End: 2022-12-30

## 2022-12-30 RX ORDER — CEFAZOLIN SODIUM 1 G/50ML
1000 SOLUTION INTRAVENOUS EVERY 8 HOURS
Status: DISCONTINUED | OUTPATIENT
Start: 2022-12-30 | End: 2023-01-03 | Stop reason: HOSPADM

## 2022-12-30 RX ADMIN — SITAGLIPTIN 25 MG: 25 TABLET, FILM COATED ORAL at 08:54

## 2022-12-30 RX ADMIN — INSULIN LISPRO 2 UNITS: 100 INJECTION, SOLUTION INTRAVENOUS; SUBCUTANEOUS at 21:32

## 2022-12-30 RX ADMIN — CEFAZOLIN SODIUM 1000 MG: 1 SOLUTION INTRAVENOUS at 16:06

## 2022-12-30 RX ADMIN — ASPIRIN 81 MG CHEWABLE TABLET 81 MG: 81 TABLET CHEWABLE at 08:55

## 2022-12-30 RX ADMIN — Medication 1000 UNITS: at 08:54

## 2022-12-30 RX ADMIN — COLCHICINE 1.2 MG: 0.6 TABLET ORAL at 16:06

## 2022-12-30 RX ADMIN — OMEGA-3 FATTY ACIDS CAP 1000 MG 1000 MG: 1000 CAP at 08:55

## 2022-12-30 RX ADMIN — INSULIN GLARGINE 20 UNITS: 100 INJECTION, SOLUTION SUBCUTANEOUS at 21:32

## 2022-12-30 RX ADMIN — DIPHENHYDRAMINE HYDROCHLORIDE 25 MG: 50 INJECTION, SOLUTION INTRAMUSCULAR; INTRAVENOUS at 01:35

## 2022-12-30 RX ADMIN — CLOBETASOL PROPIONATE 1 APPLICATION: 0.5 CREAM TOPICAL at 09:03

## 2022-12-30 RX ADMIN — METOPROLOL SUCCINATE 50 MG: 50 TABLET, EXTENDED RELEASE ORAL at 08:55

## 2022-12-30 RX ADMIN — TAMSULOSIN HYDROCHLORIDE 0.4 MG: 0.4 CAPSULE ORAL at 16:06

## 2022-12-30 RX ADMIN — OMEGA-3 FATTY ACIDS CAP 1000 MG 1000 MG: 1000 CAP at 17:03

## 2022-12-30 RX ADMIN — Medication 500 MG: at 08:55

## 2022-12-30 RX ADMIN — CEFAZOLIN SODIUM 1000 MG: 1 SOLUTION INTRAVENOUS at 04:44

## 2022-12-30 RX ADMIN — INSULIN LISPRO 1 UNITS: 100 INJECTION, SOLUTION INTRAVENOUS; SUBCUTANEOUS at 11:23

## 2022-12-30 RX ADMIN — ENOXAPARIN SODIUM 30 MG: 30 INJECTION SUBCUTANEOUS at 08:56

## 2022-12-30 RX ADMIN — CLOBETASOL PROPIONATE: 0.5 CREAM TOPICAL at 17:03

## 2022-12-30 RX ADMIN — LISINOPRIL 5 MG: 5 TABLET ORAL at 08:55

## 2022-12-30 RX ADMIN — CYANOCOBALAMIN TAB 500 MCG 1000 MCG: 500 TAB at 08:55

## 2022-12-30 RX ADMIN — CEFAZOLIN SODIUM 1000 MG: 1 SOLUTION INTRAVENOUS at 23:51

## 2022-12-30 RX ADMIN — AMLODIPINE BESYLATE 10 MG: 10 TABLET ORAL at 08:55

## 2022-12-30 NOTE — OCCUPATIONAL THERAPY NOTE
Occupational Therapy Evaluation/Treatment       12/30/22 1100   Note Type   Note type Evaluation   Pain Assessment   Pain Assessment Tool 0-10   Pain Score No Pain   Restrictions/Precautions   Other Precautions Chair Alarm; Bed Alarm; Fall Risk;Cognitive   Home Living   Type of [address] One level  (3 [address])   Home Equipment Cane   Prior Function   Level of [employer] Independent with ADLs; Independent with functional mobility; Needs assistance with IADLS   Lives With Spouse   Receives Help From Family   ADL   Eating Assistance 4  Minimal Assistance   Grooming Assistance 4  Minimal Assistance   UB Bathing Assistance 3  Moderate Assistance   LB Bathing Assistance 2  Maximal Assistance   UB Dressing Assistance 3  Moderate Assistance   LB Dressing Assistance 2  Maximal Assistance   Toileting Assistance  2  Maximal Assistance   Bed Mobility   Supine to Sit 3  Moderate assistance   Sit to Supine 3  Moderate assistance   Transfers   Sit to Stand 3  Moderate assistance   Stand to Sit 3  Moderate assistance   Functional Mobility   Functional Mobility 3  Moderate assistance   Additional Comments few steps to head of bed with RW   Balance   Static Sitting Poor +   Dynamic Sitting Poor   Static Standing Poor   Dynamic Standing Poor   Activity Tolerance   Activity Tolerance Patient limited by fatigue  (cognition)   Nurse Made Aware yes   RUE Assessment   RUE Assessment WFL   LUE Assessment   LUE Assessment WFL   Cognition   Overall Cognitive Status Impaired   Arousal/Participation Cooperative   Attention Attends with cues to redirect   Orientation Level Oriented to person;Oriented to place   Following Commands Follows one step commands with increased time or repetition   Assessment   Limitation Decreased ADL status; Decreased UE strength;Decreased Safe judgement during ADL;Decreased endurance;Decreased self-care trans;Decreased high-level ADLs  (decreased balance and mobility)   Prognosis Good   Assessment Patient evaluated by Occupational Therapy  Patient admitted with Cellulitis  The patients occupational profile, medical and therapy history includes a extensive additional review of physical, cognitive, or psychosocial history related to current functional performance  Comorbidities affecting functional mobility and ADLS include: CAD, diabetes and hypertension  Prior to admission, patient was independent with functional mobility with cane, independent with ADLS and requiring assist for IADLS  The evaluation identifies the following performance deficits: weakness, impaired balance, decreased endurance, increased fall risk, new onset of impairment of functional mobility, decreased ADLS, decreased IADLS, decreased activity tolerance, decreased safety awareness, impaired judgement, decreased cognition and decreased strength, that result in activity limitations and/or participation restrictions  This evaluation requires clinical decision making of high complexity, because the patient presents with comorbidites that affect occupational performance and required significant modification of tasks or assistance with consideration of multiple treatment options  The Barthel Index was used as a functional outcome tool presenting with a score of Barthel Index Score: 30, indicating marked limitations of functional mobility and ADLS  The patient's raw score on the -PAC Daily Activity inpatient short form is 14, standardized score is 33 39, less than 39 4  Patients at this level are likely to benefit from DC to post-acute rehabilitation services  Please refer to the recommendation of the Occupational Therapist for safe DC planning  Patient will benefit from skilled Occupational Therapy services to address above deficits and facilitate a safe return to prior level of function  Goals   Patient Goals to go home   STG Time Frame   (1-7 days)   Short Term Goal  Goals established to promote Patient Goals:  To go home:  Eating: supervision; Grooming: supervision seated; Bathing: mod assist; Upper Body Dressing min assist; Lower Body Dressing: mod assist; Toileting: mod assist; Patient will increase ambulatory standard toilet transfer to min assist with rolling walker to increase performance and safety with ADLS and functional mobility; Patient will increase standing tolerance to 3 minutes during ADL task to decrease assistance level and decrease fall risk; Patient will increase bed mobility to min assist in preparation for ADLS and transfers; Patient will increase functional mobility to and from bathroom with rolling walker with min assist to increase performance with ADLS and to use a toilet; Patient will tolerate 10 minutes of UE ROM/strengthening to increase general activity tolerance and performance in ADLS/IADLS; Patient will improve functional activity tolerance to 10 minutes of sustained functional tasks to increase participation in basic self-care and decrease assistance level;   Patient will increase dynamic sitting balance to fair- to improve the ability to sit at edge of bed or on a chair for ADLS;  Patient will increase dynamic standing balance to poor+ to improve postural stability and decrease fall risk during standing ADLS and transfers  LTG Time Frame   (8-14 days)   Long Term Goal Eating: independent; Grooming: independent seated; Bathing: min assist; Upper Body Dressing supervision; Lower Body Dressing: min assist; Toileting: min assist; Patient will increase ambulatory standard toilet transfer to supervision with rolling walker to increase performance and safety with ADLS and functional mobility; Patient will increase standing tolerance to 6 minutes during ADL task to decrease assistance level and decrease fall risk; Patient will increase bed mobility to supervision in preparation for ADLS and transfers;  Patient will increase functional mobility to and from bathroom with rolling walker with supervision to increase performance with ADLS and to use a toilet; Patient will tolerate 20 minutes of UE ROM/strengthening to increase general activity tolerance and performance in ADLS/IADLS; Patient will improve functional activity tolerance to 20 minutes of sustained functional tasks to increase participation in basic self-care and decrease assistance level;   Patient will increase dynamic sitting balance to fair to improve the ability to sit at edge of bed or on a chair for ADLS;  Patient will increase dynamic standing balance to fair- to improve postural stability and decrease fall risk during standing ADLS and transfers  Pt will score >/= 18/24 on AM-PAC Daily Activity Inpatient scale to promote safe independence with ADLs and functional mobility; Pt will score >/= 60/100 on Barthel Index in order to decrease caregiver assistance needed and increase ability to perform ADLs and functional mobility  Plan   Treatment Interventions ADL retraining;Functional transfer training;UE strengthening/ROM; Endurance training;Patient/family training;Equipment evaluation/education; Activityengagement; Compensatory technique education;Cognitive reorientation   Goal Expiration Date 01/13/23   OT Frequency 3-5x/wk   Recommendation   OT Discharge Recommendation Post acute rehabilitation services   AM-PAC Daily Activity Inpatient   Lower Body Dressing 2   Bathing 2   Toileting 2   Upper Body Dressing 2   Grooming 3   Eating 3   Daily Activity Raw Score 14   Daily Activity Standardized Score (Calc for Raw Score >=11) 33 39   AM-PAC Applied Cognition Inpatient   Following a Speech/Presentation 2   Understanding Ordinary Conversation 3   Taking Medications 1   Remembering Where Things Are Placed or Put Away 1   Remembering List of 4-5 Errands 1   Taking Care of Complicated Tasks 1   Applied Cognition Raw Score 9   Applied Cognition Standardized Score 22 48   Barthel Index   Feeding 5   Bathing 0   Grooming Score 0   Dressing Score 5   Bladder Score 0 Bowels Score 10   Toilet Use Score 5   Transfers (Bed/Chair) Score 5   Mobility (Level Surface) Score 0   Stairs Score 0   Barthel Index Score 30   Additional Treatment Session   Start Time 1050   End Time 1100   Treatment Assessment S: denies pain O: Completed sit to stand with mod assist   Completed standing tolerance for max assist hygiene and bathing LB due to urine incontinence, mod assist for balance with RW  Functional mobility few steps to head of bed with RW with mod assist   Sit to supine mod assist   A: Patient is pleasant but confused, little verbalization throughout session    Patient is generally weak and deconditioned and will benefit from STR P: May Mccarthy [id number] License Number  Trish Tovar Krishan 87 OTR/L [id number]

## 2022-12-30 NOTE — CONSULTS
Consult - Podiatry   Mira Candelario 80 y o  male MRN: [id number]  Unit/Bed#: [address] [place] [address] Encounter: [id number]    Assessment/Plan     Assessment:  Inflammatory arthritis versus cellulitis left ankle  Pain in the foot  Plan:  X-rays reviewed, laboratory results reviewed, patient likely to have inflammatory arthritis possibly pseudogout, will order MRI to rule out cellulitis or septic joint disease, patient nonweightbearing to the left lower extremity, will follow up while in-house    History of Present Illness     HPI:  Mira Candelario is a 80 y o  male who presents with past medical history significant of diabetes mellitus, peripheral artery disease, coronary artery disease, chronic kidney disease patient was admitted due to inability to ambulate left lower extremities, swelling and pain  Consults  Review of Systems   Constitutional: Negative       Musculoskeletal: Ankle swelling  Skin: Color change  Neurological: Negative  Psych: negative      Historical Information   No past medical history on file  No past surgical history on file  Social History   Social History     Substance and Sexual Activity   Alcohol Use Not Currently     Social History     Substance and Sexual Activity   Drug Use Never     Social History     Tobacco Use   Smoking Status Unknown   Smokeless Tobacco Not on file     Family History: No family history on file      Meds/Allergies   Medications Prior to Admission   Medication   • amLODIPine (NORVASC) 10 mg tablet   • aspirin 81 mg chewable tablet   • cholecalciferol (VITAMIN D3) 1,000 units tablet   • clobetasol (TEMOVATE) 0 05 % GEL   • insulin glargine (TOUJEO) 300 units/mL CONCENTRATED U-300 injection pen (1-unit dial)   • linaGLIPtin 5 MG TABS   • lisinopril (ZESTRIL) 5 mg tablet   • magnesium gluconate (MAGONATE) 500 mg tablet   • metoprolol succinate (TOPROL-XL) 50 mg 24 hr tablet   • omega-3-acid ethyl esters (LOVAZA) 1 g capsule   • tamsulosin (FLOMAX) 0 4 mg   • vitamin B-12 (VITAMIN B-12) 1,000 mcg tablet     No Known Allergies    Objective   First Vitals:   Blood Pressure: 163/99 (12/29/22 0828)  Pulse: 77 (12/29/22 0828)  Temperature: (!) 97 3 °F (36 3 °C) (12/29/22 0828)  Temp Source: Tympanic (12/29/22 0828)  Respirations: 20 (12/29/22 0828)  Height: 6' 2" (188 cm) (12/29/22 0840)  Weight - Scale: 85 3 kg (188 lb) (12/29/22 0840)  SpO2: 95 % (12/29/22 0828)    Current Vitals:   Blood Pressure: 120/80 (12/30/22 1502)  Pulse: 58 (12/30/22 1502)  Temperature: 99 7 °F (37 6 °C) (12/30/22 1502)  Temp Source: Axillary (12/30/22 1433)  Respirations: 18 (12/30/22 1433)  Height: 6' 2" (188 cm) (12/29/22 0840)  Weight - Scale: 85 3 kg (188 lb) (12/29/22 0840)  SpO2: 95 % (12/30/22 1502)        /80   Pulse 58   Temp 99 7 °F (37 6 °C)   Resp 18   Ht 6' 2" (1 88 m)   Wt 85 3 kg (188 lb)   SpO2 95%   BMI 24 14 kg/m²   Physical Exam:  Constitutional    General:No acute distress, well appearing and well nourished    Pulmonary   Respiratory Effort: No increased work of breathing or signs of respitatory distress     Cardiovascular   Vascular Exam: Palpable pedal pulse CFT is less than 3 seconds temperature gradient left foot is warm to touch    Lymphatic   Nodes:Unable to assess    Orthopedic   Inspection: Localized edema noted to the lateral aspect of the left ankle and foot, localized erythema, no open lesions, painful on palpation and range of motion of the ankle and subtalar joint  Skin   Findings: Diffuse erythema lateral foot    Neurologic   Findings: Protective sensation diminished  Patient reports marked pain upon palpation to the lateral aspect of the left foot  Psychiatric   Orientation:Oriented to person, place, and time    Lab Results:   Admission on 12/29/2022   Component Date Value   • WBC 12/29/2022 10 80 (H)    • RBC 12/29/2022 5 32    • Hemoglobin 12/29/2022 15 2    • Hematocrit 12/29/2022 47 5    • MCV 12/29/2022 89    • MCH 12/29/2022 28 6    • MCHC 12/29/2022 32 0 • RDW 12/29/2022 13 4    • MPV 12/29/2022 11 2    • Platelets [birth year]1950 262    • nRBC 12/29/2022 0    • Neutrophils Relative 12/29/2022 81 (H)    • Immat GRANS % 12/29/2022 1    • Lymphocytes Relative 12/29/2022 9 (L)    • Monocytes Relative 12/29/2022 8    • Eosinophils Relative 12/29/2022 1    • Basophils Relative 12/29/2022 0    • Neutrophils Absolute 12/29/2022 8 83 (H)    • Immature Grans Absolute 12/29/2022 0 05    • Lymphocytes Absolute 12/29/2022 0 94    • Monocytes Absolute 12/29/2022 0 85    • Eosinophils Absolute 12/29/2022 0 10    • Basophils Absolute 12/29/2022 0 03    • Sodium 12/29/2022 140    • Potassium 12/29/2022 4 1    • Chloride 12/29/2022 101    • CO2 12/29/2022 28    • ANION GAP 12/29/2022 11    • BUN 12/29/2022 31 (H)    • Creatinine 12/29/2022 2 01 (H)    • Glucose 12/29/2022 234 (H)    • Calcium 12/29/2022 9 5    • Corrected Calcium 12/29/2022 10 0    • AST 12/29/2022 16    • ALT 12/29/2022 17    • Alkaline Phosphatase 12/29/2022 135 (H)    • Total Protein 12/29/2022 8 7 (H)    • Albumin 12/29/2022 3 4 (L)    • Total Bilirubin 12/29/2022 0 92    • eGFR 12/29/2022 30    • Blood Culture 12/29/2022 No Growth at 24 hrs  • Blood Culture 12/29/2022 No Growth at 24 hrs      • LACTIC ACID 12/29/2022 1 9    • Color, UA 12/29/2022 Yellow    • Clarity, UA 12/29/2022 Clear    • Specific Gravity, UA 12/29/2022 1 020    • pH, UA 12/29/2022 6 0    • Leukocytes, UA 12/29/2022 Negative    • Nitrite, UA 12/29/2022 Negative    • Protein, UA 12/29/2022 Trace (A)    • Glucose, UA 12/29/2022 Negative    • Ketones, UA 12/29/2022 Negative    • Urobilinogen, UA 12/29/2022 0 2    • Bilirubin, UA 12/29/2022 Negative    • Occult Blood, UA 12/29/2022 Negative    • RBC, UA 12/29/2022 None Seen    • WBC, UA 12/29/2022 0-1    • Epithelial Cells 12/29/2022 None Seen    • Bacteria, UA 12/29/2022 None Seen    • SARS-CoV-2 12/29/2022 Negative    • Uric Acid 12/29/2022 7 3    • CRP 12/29/2022 151 3 (H)    • POC Glucose 12/29/2022 189 (H)    • POC Glucose 12/29/2022 134    • Hemoglobin A1C 12/30/2022 7 0 (H)    • EAG 12/30/2022 154    • WBC 12/30/2022 9 63    • RBC 12/30/2022 4 80    • Hemoglobin 12/30/2022 13 8    • Hematocrit 12/30/2022 42 2    • MCV 12/30/2022 88    • MCH 12/30/2022 28 8    • MCHC 12/30/2022 32 7    • RDW 12/30/2022 13 2    • Platelets 03/60/3279 242    • MPV 12/30/2022 11 1    • Sodium 12/30/2022 140    • Potassium 12/30/2022 3 7    • Chloride 12/30/2022 103    • CO2 12/30/2022 26    • ANION GAP 12/30/2022 11    • BUN 12/30/2022 26 (H)    • Creatinine 12/30/2022 1 43 (H)    • Glucose 12/30/2022 145 (H)    • Calcium 12/30/2022 9 1    • eGFR 12/30/2022 45    • Ventricular Rate 12/29/2022 92    • Atrial Rate 12/29/2022 92    • OK Interval 12/29/2022 164    • QRSD Interval 12/29/2022 86    • QT Interval 12/29/2022 354    • QTC Interval 12/29/2022 437    • P Axis 12/29/2022 39    • QRS Axis 12/29/2022 -2    • T Wave Axis 12/29/2022 34    • POC Glucose 12/30/2022 135    • POC Glucose 12/30/2022 165 (H)    • POC Glucose 12/30/2022 132            Results from last 7 days   Lab Units 12/29/22  0835 12/29/22  0822   BLOOD CULTURE  No Growth at 24 hrs  No Growth at 24 hrs  Imaging: I have personally reviewed pertinent films in PACS      Code Status: Level 1 - Full Code  Advance Directive and Living Will:      Power of :    POLST:        Portions of the record may have been created with voice recognition software  Occasional wrong word or "sound a like" substitutions may have occurred due to the inherent limitations of voice recognition software  Read the chart carefully and recognize, using context, where substitutions have occurred  Counseling and Coordination of care  I spent 50 minutes face-to-face with patient today  More than 50% was spent in counseling & coordination of care  Counseled patient regarding gout, pseudogout, cellulitis, diabetic foot infection

## 2022-12-30 NOTE — PLAN OF CARE
Problem: MOBILITY - ADULT  Goal: Maintain or return to baseline ADL function  Description: INTERVENTIONS:  -  Assess patient's ability to carry out ADLs; assess patient's baseline for ADL function and identify physical deficits which impact ability to perform ADLs (bathing, care of mouth/teeth, toileting, grooming, dressing, etc )  - Assess/evaluate cause of self-care deficits   - Assess range of motion  - Assess patient's mobility; develop plan if impaired  - Assess patient's need for assistive devices and provide as appropriate  - Encourage maximum independence but intervene and supervise when necessary  - Involve family in performance of ADLs  - Assess for home care needs following discharge   - Consider OT consult to assist with ADL evaluation and planning for discharge  - Provide patient education as appropriate  Outcome: Progressing  Goal: Maintains/Returns to pre admission functional level  Description: INTERVENTIONS:  - Perform BMAT or MOVE assessment daily    - Set and communicate daily mobility goal to care team and patient/family/caregiver  - Collaborate with rehabilitation services on mobility goals if consulted  - Perform Range of Motion 2 times a day  - Reposition patient every 2 hours    - Dangle patient 2 times a day  - Stand patient 2 times a day  - Ambulate patient 2 times a day  - Out of bed to chair 2 times a day   - Out of bed for meals 2 times a day  - Out of bed for toileting  - Record patient progress and toleration of activity level   Outcome: Progressing     Problem: Prexisting or High Potential for Compromised Skin Integrity  Goal: Skin integrity is maintained or improved  Description: INTERVENTIONS:  - Identify patients at risk for skin breakdown  - Assess and monitor skin integrity  - Assess and monitor nutrition and hydration status  - Monitor labs   - Assess for incontinence   - Turn and reposition patient  - Assist with mobility/ambulation  - Relieve pressure over bony prominences  - Avoid friction and shearing  - Provide appropriate hygiene as needed including keeping skin clean and dry  - Evaluate need for skin moisturizer/barrier cream  - Collaborate with interdisciplinary team   - Patient/family teaching  - Consider wound care consult   Outcome: Progressing     Problem: PAIN - ADULT  Goal: Verbalizes/displays adequate comfort level or baseline comfort level  Description: Interventions:  - Encourage patient to monitor pain and request assistance  - Assess pain using appropriate pain scale  - Administer analgesics based on type and severity of pain and evaluate response  - Implement non-pharmacological measures as appropriate and evaluate response  - Consider cultural and social influences on pain and pain management  - Notify physician/advanced practitioner if interventions unsuccessful or patient reports new pain  Outcome: Progressing     Problem: INFECTION - ADULT  Goal: Absence or prevention of progression during hospitalization  Description: INTERVENTIONS:  - Assess and monitor for signs and symptoms of infection  - Monitor lab/diagnostic results  - Monitor all insertion sites, i e  indwelling lines, tubes, and drains  - Monitor endotracheal if appropriate and nasal secretions for changes in amount and color  - [place] appropriate cooling/warming therapies per order  - Administer medications as ordered  - Instruct and encourage patient and family to use good hand hygiene technique  - Identify and instruct in appropriate isolation precautions for identified infection/condition  Outcome: Progressing  Goal: Absence of fever/infection during neutropenic period  Description: INTERVENTIONS:  - Monitor WBC    Outcome: Progressing     Problem: SAFETY ADULT  Goal: Maintain or return to baseline ADL function  Description: INTERVENTIONS:  -  Assess patient's ability to carry out ADLs; assess patient's baseline for ADL function and identify physical deficits which impact ability to perform ADLs (bathing, care of mouth/teeth, toileting, grooming, dressing, etc )  - Assess/evaluate cause of self-care deficits   - Assess range of motion  - Assess patient's mobility; develop plan if impaired  - Assess patient's need for assistive devices and provide as appropriate  - Encourage maximum independence but intervene and supervise when necessary  - Involve family in performance of ADLs  - Assess for home care needs following discharge   - Consider OT consult to assist with ADL evaluation and planning for discharge  - Provide patient education as appropriate  Outcome: Progressing  Goal: Maintains/Returns to pre admission functional level  Description: INTERVENTIONS:  - Perform BMAT or MOVE assessment daily    - Set and communicate daily mobility goal to care team and patient/family/caregiver  - Collaborate with rehabilitation services on mobility goals if consulted  - Perform Range of Motion 2 times a day  - Reposition patient every 2 hours    - Dangle patient 2 times a day  - Stand patient 2 times a day  - Ambulate patient 2 times a day  - Out of bed to chair 2 times a day   - Out of bed for meals 2 times a day  - Out of bed for toileting  - Record patient progress and toleration of activity level   Outcome: Progressing  Goal: Patient will remain free of falls  Description: INTERVENTIONS:  - Educate patient/family on patient safety including physical limitations  - Instruct patient to call for assistance with activity   - Consult OT/PT to assist with strengthening/mobility   - Keep Call bell within reach  - Keep bed low and locked with side rails adjusted as appropriate  - Keep care items and personal belongings within reach  - Initiate and maintain comfort rounds  - Make Fall Risk Sign visible to staff  - Offer Toileting every 2 Hours, in advance of need  - Initiate/Maintain bed alarm  - Obtain necessary fall risk management equipment: Call bell  - Apply yellow socks and bracelet for high fall risk patients  - Consider moving patient to room near nurses station  Outcome: Progressing     Problem: DISCHARGE PLANNING  Goal: Discharge to home or other facility with appropriate resources  Description: INTERVENTIONS:  - Identify barriers to discharge w/patient and caregiver  - Arrange for needed discharge resources and transportation as appropriate  - Identify discharge learning needs (meds, wound care, etc )  - Arrange for interpretive services to assist at discharge as needed  - Refer to Case Management Department for coordinating discharge planning if the patient needs post-hospital services based on physician/advanced practitioner order or complex needs related to functional status, cognitive ability, or social support system  Outcome: Progressing     Problem: Knowledge Deficit  Goal: Patient/family/caregiver demonstrates understanding of disease process, treatment plan, medications, and discharge instructions  Description: Complete learning assessment and assess knowledge base    Interventions:  - Provide teaching at level of understanding  - Provide teaching via preferred learning methods  Outcome: Progressing     Problem: Potential for Falls  Goal: Patient will remain free of falls  Description: INTERVENTIONS:  - Educate patient/family on patient safety including physical limitations  - Instruct patient to call for assistance with activity   - Consult OT/PT to assist with strengthening/mobility   - Keep Call bell within reach  - Keep bed low and locked with side rails adjusted as appropriate  - Keep care items and personal belongings within reach  - Initiate and maintain comfort rounds  - Make Fall Risk Sign visible to staff  - Offer Toileting every 2 Hours, in advance of need  - Initiate/Maintain bed alarm  - Obtain necessary fall risk management equipment: Call bell  - Apply yellow socks and bracelet for high fall risk patients  - Consider moving patient to room near nurses station  Outcome: Progressing

## 2022-12-30 NOTE — PHYSICAL THERAPY NOTE
PHYSICAL THERAPY EVALUATION/TREATMENT     12/30/22 1342   PT Last Visit   PT Visit Date 12/30/22   Note Type   Note type Evaluation   Pain Assessment   Pain Assessment Tool 0-10   Pain Score No Pain   Restrictions/Precautions   Other Precautions Fall Risk;Bed Alarm; Chair Alarm;Cognitive   Home Living   Type of [facility] One level;Stairs to enter with rails  (3 steps to enter)   [address] [facility]   Prior Function   Level of [place] Independent with ADLs; Independent with functional mobility   Lives With Spouse   Receives Help From Family   Comments Patient ambulating with a cane prior to admission  Patient does not drive  General   Additional Pertinent History Patient is admitted with worsening swelling and erythema and pain left foot and ankle over the past 2 days  Patient is still awaiting podiatry consult at time of PT eval but okay with SLIM to evaluate and treat patient per MDR  Family/Caregiver Present Yes  (Patient's spouse)   Cognition   Overall Cognitive Status Impaired   Arousal/Participation Cooperative   Orientation Level Oriented to person;Disoriented to time;Oriented to situation  (Generally oriented to place, knows he is in the hospital)   Following Commands Follows multistep commands with increased time or repetition   Comments Patient ID by name and date of birth, confirmed by ID bracelet  Wife tends to answer questions for patient  Subjective   Subjective "I think I have to go to the bathroom" by the time PT retrieved a urinal patient was incontinent of urine in the bed  Hygiene performed   RLE Assessment   RLE Assessment WFL  (Grossly 3 -/5)   LLE Assessment   LLE Assessment WFL  (Grossly 3 -/5)   Bed Mobility   Supine to Sit 3  Moderate assistance   Additional items Assist x 1;Verbal cues;HOB elevated;LE management   Transfers   Sit to Stand 3  Moderate assistance   Additional items Assist x 1;Verbal cues; Increased time required   Stand to Sit 3  Moderate assistance   Additional items Assist x 1;Verbal cues; Increased time required   Additional Comments Pt sit to stand transfer with mod assist of 1 and stood with a roller walker approximately 10 to 15 seconds x 2 reps and then returned to sitting edge of bed   Balance   Static Sitting Fair   Static Standing Fair -  (With roller walker)   Endurance Deficit   Endurance Deficit Yes   Endurance Deficit Description Limited standing tolerance gait and mobility   Activity Tolerance   Activity Tolerance Patient limited by fatigue;Treatment limited secondary to medical complications (Comment)  (Impaired cognition)   Assessment   Problem List Decreased strength;Decreased range of motion;Decreased endurance; Impaired balance;Decreased mobility; Decreased safety awareness;Decreased cognition; Impaired judgement   Assessment Patient seen for Physical Therapy evaluation  Patient admitted with Cellulitis  Comorbidities affecting patient's physical performance include: CAD, CKD, BPH, chronic lung disease, DM 2, hypertension  Personal factors affecting patient at time of initial evaluation include: lives in one story house, ambulating with assistive device, stairs to enter home, inability to navigate community distances, inability to navigate level surfaces without external assistance, inability to perform dynamic tasks in community and decreased cognition  Prior to admission, patient was independent with functional mobility with cane, independent with ADLS, requiring assist for IADLS and living with spouse in a one level home with 3 steps to enter  Please find objective findings from Physical Therapy assessment regarding body systems outlined above with impairments and limitations including weakness, decreased ROM, impaired balance, decreased endurance, gait deviations, decreased activity tolerance, decreased functional mobility tolerance, decreased safety awareness, impaired judgement, fall risk and decreased cognition    The Barthel Index was used as a functional outcome tool presenting with a score of Barthel Index Score: 30 today indicating marked limitations of functional mobility and ADLS  Patient's clinical presentation is currently unstable/unpredictable as seen in patient's presentation of vital sign response, changing level of pain, varying levels of cognitive performance, increased fall risk, new onset of impairment of functional mobility, decreased endurance and new onset of weakness  Pt would benefit from continued Physical Therapy treatment to address deficits as defined above and maximize level of functional mobility  As demonstrated by objective findings, the assigned level of complexity for this evaluation is high  The patient's -[facility] Basic Mobility Inpatient Short Form Raw Score is 11  A Raw score of less than or equal to 16 suggests the patient may benefit from discharge to post-acute rehabilitation services  Please also refer to the recommendation of the Physical Therapist for safe discharge planning  Goals   Patient Goals To go home   STG Expiration Date 01/06/23   Short Term Goal #1 Patient will: Perform all bed mobility tasks w/ minx1 to decrease fall risk factors, Perform all transfers w/ minx1 to improve independence, Ambulate at least 25 ft  with roller walker w/ minx1 w/o LOB, Navigate 3 stairs w/ modx1 with unilateral handrail to facilitate return to previous living environment, Increase static and dynamic sitting and static and dynamic standing balance 1 grade to decrease risk for falls, Tolerate at least 15 consecutive minutes of activity to demonstrate improved activity tolerance and endurance and Tolerate 3 hr OOB to faciliate upright tolerance   LTG Expiration Date 01/13/23   Long Term Goal #1 Patient will:  Increase bilateral LE strength 1/2 grade to facilitate independent mobility, Perform all bed mobility tasks independently to decrease fall risk factors, Perform all transfers independently to improve independence, Ambulate at least 75 ft  with roller walker independently w/o LOB, Navigate 3 stairs w/ supervision with unilateral handrail to facilitate return to previous living environment and Increase all balance 1 grade to decrease risk for falls   Plan   Treatment/Interventions ADL retraining;Functional transfer training;LE strengthening/ROM; Therapeutic exercise; Endurance training;Patient/family training;Equipment eval/education; Bed mobility;Gait training; Compensatory technique education   PT Frequency Other (Comment)  (5x/wk)   Recommendation   PT Discharge Recommendation Post acute rehabilitation services   AM-PAC Basic Mobility Inpatient   Turning in Bed Without Bedrails 2   Lying on Back to Sitting on Edge of Flat Bed 2   Moving Bed to Chair 2   Standing Up From Chair 2   Walk in Room 2   Climb 3-5 Stairs 1   Basic Mobility Inpatient Raw Score 11   Basic Mobility Standardized Score 30 25   Highest Level Of Mobility   JH-HLM Goal 4: Move to chair/commode   JH-HLM Achieved 4: Move to chair/commode   Barthel Index   Feeding 5   Bathing 0   Grooming Score 0   Dressing Score 5   Bladder Score 0   Bowels Score 10   Toilet Use Score 5   Transfers (Bed/Chair) Score 5   Mobility (Level Surface) Score 0   Stairs Score 0   Barthel Index Score 30   Additional Treatment Session   Start Time 1342   End Time 1352   Treatment Assessment Patient agreeable to attempt trial of ambulation bed to chair with the roller walker  S: Patient denies pain  O: Patient transfers sit to stand with mod assist of 1 and ambulates with a roller walker and mod assist of 1 2 to 3 feet bed to chair  Patient transfers stand to sit with mod assist of 1  Patient then performed sit to stand transfer x2 reps using the armrest of the chairs and stood with a roller walker for approximately 10 to 15 seconds with mod assist of 1   A: Patient with limited tolerance to bed mobility, transfers and short distance ambulation with a roller walker needing moderate assist for all functional mobility and gait  Per wife who is present in the room, patient is normally independently ambulatory with a cane inside and outside  Patient ambulates with forward flexion, decreased gait speed, short stride and decreased foot clearance  Patient is moderately unsteady  Patient will benefit from continued skilled physical therapy services to further increase his strength, functional mobility, balance, endurance and gait  It is appropriate for postacute rehab services when medically stable for discharge  End of Consult   Patient Position at End of Consult Bed/Chair alarm activated; Bedside chair; All needs within reach   [facility] Number  [address] [place] [address] [id number]     Portions of the documentation may have been created using voice recognition software  Occasional wrong word or sound alike substitutions may have occurred due to the inherent limitation of the voice recognition software  Read the chart carefully and recognize, using context, where substitutions have occurred

## 2022-12-30 NOTE — PLAN OF CARE
Problem: PHYSICAL THERAPY ADULT  Goal: Performs mobility at highest level of function for planned discharge setting  See evaluation for individualized goals  Description: Treatment/Interventions: ADL retraining, Functional transfer training, LE strengthening/ROM, Therapeutic exercise, Endurance training, Patient/family training, Equipment eval/education, Bed mobility, Gait training, Compensatory technique education          See flowsheet documentation for full assessment, interventions and recommendations  Note:    Problem List: Decreased strength, Decreased range of motion, Decreased endurance, Impaired balance, Decreased mobility, Decreased safety awareness, Decreased cognition, Impaired judgement  Assessment: Patient seen for Physical Therapy evaluation  Patient admitted with Cellulitis  Comorbidities affecting patient's physical performance include: CAD, CKD, BPH, chronic lung disease, DM 2, hypertension  Personal factors affecting patient at time of initial evaluation include: lives in one story house, ambulating with assistive device, stairs to enter home, inability to navigate community distances, inability to navigate level surfaces without external assistance, inability to perform dynamic tasks in community and decreased cognition  Prior to admission, patient was independent with functional mobility with cane, independent with ADLS, requiring assist for IADLS and living with spouse in a one level home with 3 steps to enter  Please find objective findings from Physical Therapy assessment regarding body systems outlined above with impairments and limitations including weakness, decreased ROM, impaired balance, decreased endurance, gait deviations, decreased activity tolerance, decreased functional mobility tolerance, decreased safety awareness, impaired judgement, fall risk and decreased cognition    The Barthel Index was used as a functional outcome tool presenting with a score of Barthel Index Score: 30 today indicating marked limitations of functional mobility and ADLS  Patient's clinical presentation is currently unstable/unpredictable as seen in patient's presentation of vital sign response, changing level of pain, varying levels of cognitive performance, increased fall risk, new onset of impairment of functional mobility, decreased endurance and new onset of weakness  Pt would benefit from continued Physical Therapy treatment to address deficits as defined above and maximize level of functional mobility  As demonstrated by objective findings, the assigned level of complexity for this evaluation is high  The patient's AM-[facility] Basic Mobility Inpatient Short Form Raw Score is 11  A Raw score of less than or equal to 16 suggests the patient may benefit from discharge to post-acute rehabilitation services  Please also refer to the recommendation of the Physical Therapist for safe discharge planning  PT Discharge Recommendation: Post acute rehabilitation services    See flowsheet documentation for full assessment

## 2022-12-30 NOTE — PROGRESS NOTES
[facility] Progress Note  Patient: Cristi Ogden 80 y o  male   MRN: [id number]  PCP: Alyse Howell  Unit/Bed#: [address] [place] [address] Encounter: [id number]  Date Of Visit: 12/30/22    Problem List:    Principal Problem:    Cellulitis  Active Problems:    CAD (coronary artery disease)    CKD (chronic kidney disease)    Type 2 diabetes mellitus, with long-term current use of insulin (HCC)    HTN (hypertension)    BPH (benign prostatic hyperplasia)    Dyslipidemia    Chronic lung disease      Assessment & Plan:    * Cellulitis  Assessment & Plan  Presented with 2-day history of worsening of left foot and ankle swelling erythema and pain  Reported fever at home, afebrile present  X-ray of the foot-No acute osseous abnormality  Evidence of first metatarsophalangeal joint osteoarthritis  Mild leukocytosis on presentation  CRP significantly elevated, uric acid normal  Denies any history of gout    Concerns of inflammatory arthritis/ pseudogout given pain with range of motion, doubt septic arthritis  Remains afebrile, normal white count  · Continue IV cefazolin, will adjust to every 8 hours  · Follow-up MRSA surveillance  · Follow-up blood cultures negative for 24 hours  · Colchicine x1  · Monitor clinically  · Consider steroid if no improvement  · Podiatry evaluation  · PT/OT    HTN (hypertension)  Assessment & Plan  Elevated, wife reports poor compliance to antihypertensive  · Resumed amlodipine, lisinopril, metoprolol  · Monitor blood pressure     Type 2 diabetes mellitus, with long-term current use of insulin [facility])  Assessment & Plan  Lab Results   Component Value Date    HGBA1C 7 0 (H) 12/30/2022       Recent Labs     12/29/22  1650 12/29/22  2043 12/30/22  0730 12/30/22  1121   POCGLU 189* 134 135 165*       Blood Sugar Average: Last 72 hrs:  (P) 189     monitor blood glucose trend ,acceptable  Continue glargine, at reduced to 20 units nightly  Substitute Tradjenta to Januvia 25 mg daily  Insulin sliding scale    CKD (chronic kidney disease)  Assessment & Plan  Lab Results   Component Value Date    EGFR 45 12/30/2022    EGFR 30 12/29/2022    CREATININE 1 43 (H) 12/30/2022    CREATININE 2 01 (H) 12/29/2022     Unknown baseline but creatinine was around 1 5-1 8 in 2018  UA unremarkable except trace protein  Creatinine lower at 1 43 today  · We will continue to monitor  · [facility]    CAD (coronary artery disease)  Assessment & Plan  History of CAD s/p LAD PCI with MCKENZIE in 2018  · Continue aspirin, metoprolol, fish oil      Chronic lung disease  Assessment & Plan  Reports history of chronic lung disease, unspecified  Details unknown  Chest x-ray-diffuse bilateral pulmonary parenchymal opacities  Denies any reports chronic shortness of breath, denies any acute symptoms  SARS-CoV-2 PCR negative  · Monitor    Dyslipidemia  Assessment & Plan  On fish oil    BPH (benign prostatic hyperplasia)  Assessment & Plan  On tamsulosin          VTE Pharmacologic Prophylaxis: VTE Score: 4 Moderate Risk (Score 3-4) - Pharmacological DVT Prophylaxis Ordered: enoxaparin (Lovenox)  Patient Centered Rounds: I performed bedside rounds with nursing staff today  Discussions with Specialists or Other Care Team Provider: *yes    Education and Discussions with Family / Patient: Updated  (wife) at bedside  Yesterday, will update today when bedside    Time Spent for Care: 30 minutes  More than 50% of total time spent on counseling and coordination of care as described above  Current Length of Stay: 1 day(s)  Current Patient Status: Inpatient   Certification Statement: The patient will continue to require additional inpatient hospital stay due to Left ankle cellulitis/swelling  Discharge Plan: Anticipate discharge in 24-48 hrs to rehab facility      Code Status: Level 1 - Full Code    Subjective:   Still reports left ankle pain  Does not offer any other complaints      Objective:     Vitals:   Temp (24hrs), Av 1 °F (36 7 °C), Min:97 °F (36 1 °C), Max:99 3 °F (37 4 °C)    Temp:  [97 °F (36 1 °C)-99 3 °F (37 4 °C)] 98 2 °F (36 8 °C)  HR:  [70-91] 91  Resp:  [12-24] 17  BP: (118-173)/(75-97) 162/97  SpO2:  [93 %-98 %] 96 %  Body mass index is 24 14 kg/m²  Input and Output Summary (last 24 hours): Intake/Output Summary (Last 24 hours) at 2022 0834  Last data filed at 2022 0731  Gross per 24 hour   Intake 1055 ml   Output --   Net 1055 ml       Physical Exam:   Physical Exam  Constitutional:       General: He is not in acute distress  HENT:      Head: Normocephalic and atraumatic  Cardiovascular:      Rate and Rhythm: Normal rate  Pulmonary:      Effort: Pulmonary effort is normal  No respiratory distress  Breath sounds: No wheezing, rhonchi or rales  Chest:      Chest wall: No tenderness  Abdominal:      General: Bowel sounds are normal  There is no distension  Palpations: Abdomen is soft  Tenderness: There is no abdominal tenderness  There is no guarding or rebound  Musculoskeletal:         General: Swelling present  Right lower leg: No edema  Left lower leg: No edema  Comments: Left ankle swelling erythema and tenderness appears mildly improved   Skin:     General: Skin is warm and dry  Findings: No rash  Neurological:      Mental Status: He is alert  Mental status is at baseline  Cranial Nerves: No cranial nerve deficit           Additional Data:     Labs:  Results from last 7 days   Lab Units 22  0454 22  0822   WBC Thousand/uL 9 63 10 80*   HEMOGLOBIN g/dL 13 8 15 2   HEMATOCRIT % 42 2 47 5   PLATELETS Thousands/uL 242 262   NEUTROS PCT %  --  81*   LYMPHS PCT %  --  9*   MONOS PCT %  --  8   EOS PCT %  --  1     Results from last 7 days   Lab Units 22  0454 22  0822   SODIUM mmol/L 140 140   POTASSIUM mmol/L 3 7 4 1   CHLORIDE mmol/L 103 101   CO2 mmol/L 26 28   BUN mg/dL 26* 31*   CREATININE mg/dL 1 43* 2 01*   ANION GAP mmol/L 11 11   CALCIUM mg/dL 9 1 9 5   ALBUMIN g/dL  --  3 4*   TOTAL BILIRUBIN mg/dL  --  0 92   ALK PHOS U/L  --  135*   ALT U/L  --  17   AST U/L  --  16   GLUCOSE RANDOM mg/dL 145* 234*         Results from last 7 days   Lab Units 12/30/22  0730 12/29/22  2043 12/29/22  1650   POC GLUCOSE mg/dl 135 134 189*         Results from last 7 days   Lab Units 12/29/22  5470   LACTIC ACID mmol/L 1 9       Lines/Drains:  Invasive Devices     Peripheral Intravenous Line  Duration           Peripheral IV 12/29/22 Left;Proximal;Ventral (anterior) Forearm 1 day    Peripheral IV 12/29/22 Right Antecubital 1 day          Drain  Duration           External Urinary Catheter <1 day                      Imaging: Reviewed radiology reports from this admission including: chest xray and xray(s)    Recent Cultures (last 7 days):   Results from last 7 days   Lab Units 12/29/22  0835 12/29/22  7071   BLOOD CULTURE  Received in Microbiology Lab  Culture in Progress  Received in Microbiology Lab  Culture in Progress         Last 24 Hours Medication List:   Current Facility-Administered Medications   Medication Dose Route Frequency Provider Last Rate   • acetaminophen  650 mg Oral Q6H PRN John Pena MD     • amLODIPine  10 mg Oral Daily John Pena MD     • aspirin  81 mg Oral Daily John Pena MD     • cefazolin  1,000 mg Intravenous Q12H John Pena MD 1,000 mg (12/30/22 0444)   • cholecalciferol  1,000 Units Oral Daily John Pena MD     • clobetasol   Topical BID John Pena MD     • vitamin B-12  1,000 mcg Oral Daily John Pena MD     • enoxaparin  30 mg Subcutaneous Daily John Pena MD     • fish oil  1,000 mg Oral BID John Pena MD     • insulin glargine  20 Units Subcutaneous HS John Pena MD     • insulin lispro  1-5 Units Subcutaneous TID AC John Pena MD     • insulin lispro  1-5 Units Subcutaneous HS John Pena MD     • lisinopril  5 mg Oral Daily [facility] Josh Hunt MD     • magnesium gluconate  500 mg Oral Daily Matthew Santiago MD     • metoprolol succinate  50 mg Oral Daily Matthew Santiago MD     • sitaGLIPtin  25 mg Oral Daily Matthew Santiago MD     • tamsulosin  0 4 mg Oral Daily With Nuris Olson MD          Today, Patient Was Seen By: Matthew Santiago MD    ** Please Note: "This note has been constructed using a voice recognition system  Therefore there may be syntax, spelling, and/or grammatical errors   Please call if you have any questions  "**

## 2022-12-31 ENCOUNTER — APPOINTMENT (INPATIENT)
Dept: RADIOLOGY | Facility: HOSPITAL | Age: 82
End: 2022-12-31

## 2022-12-31 LAB
ANION GAP SERPL CALCULATED.3IONS-SCNC: 10 MMOL/L (ref 4–13)
BUN SERPL-MCNC: 34 MG/DL (ref 5–25)
CALCIUM SERPL-MCNC: 8.7 MG/DL (ref 8.3–10.1)
CHLORIDE SERPL-SCNC: 103 MMOL/L (ref 96–108)
CO2 SERPL-SCNC: 26 MMOL/L (ref 21–32)
CREAT SERPL-MCNC: 1.54 MG/DL (ref 0.6–1.3)
ERYTHROCYTE [DISTWIDTH] IN BLOOD BY AUTOMATED COUNT: 13.2 % (ref 11.6–15.1)
GFR SERPL CREATININE-BSD FRML MDRD: 41 ML/MIN/1.73SQ M
GLUCOSE SERPL-MCNC: 135 MG/DL (ref 65–140)
GLUCOSE SERPL-MCNC: 139 MG/DL (ref 65–140)
GLUCOSE SERPL-MCNC: 159 MG/DL (ref 65–140)
GLUCOSE SERPL-MCNC: 165 MG/DL (ref 65–140)
GLUCOSE SERPL-MCNC: 166 MG/DL (ref 65–140)
HCT VFR BLD AUTO: 40.2 % (ref 36.5–49.3)
HGB BLD-MCNC: 13.1 G/DL (ref 12–17)
MCH RBC QN AUTO: 28.7 PG (ref 26.8–34.3)
MCHC RBC AUTO-ENTMCNC: 32.6 G/DL (ref 31.4–37.4)
MCV RBC AUTO: 88 FL (ref 82–98)
PLATELET # BLD AUTO: 248 THOUSANDS/UL (ref 149–390)
PMV BLD AUTO: 11 FL (ref 8.9–12.7)
POTASSIUM SERPL-SCNC: 4.1 MMOL/L (ref 3.5–5.3)
RBC # BLD AUTO: 4.57 MILLION/UL (ref 3.88–5.62)
SODIUM SERPL-SCNC: 139 MMOL/L (ref 135–147)
WBC # BLD AUTO: 7.31 THOUSAND/UL (ref 4.31–10.16)

## 2022-12-31 RX ADMIN — CLOBETASOL PROPIONATE: 0.5 CREAM TOPICAL at 08:36

## 2022-12-31 RX ADMIN — METOPROLOL SUCCINATE 50 MG: 50 TABLET, EXTENDED RELEASE ORAL at 08:35

## 2022-12-31 RX ADMIN — INSULIN LISPRO 1 UNITS: 100 INJECTION, SOLUTION INTRAVENOUS; SUBCUTANEOUS at 11:33

## 2022-12-31 RX ADMIN — CEFAZOLIN SODIUM 1000 MG: 1 SOLUTION INTRAVENOUS at 15:24

## 2022-12-31 RX ADMIN — LISINOPRIL 5 MG: 5 TABLET ORAL at 08:36

## 2022-12-31 RX ADMIN — Medication 500 MG: at 08:36

## 2022-12-31 RX ADMIN — ASPIRIN 81 MG CHEWABLE TABLET 81 MG: 81 TABLET CHEWABLE at 08:36

## 2022-12-31 RX ADMIN — ENOXAPARIN SODIUM 30 MG: 30 INJECTION SUBCUTANEOUS at 08:36

## 2022-12-31 RX ADMIN — CEFAZOLIN SODIUM 1000 MG: 1 SOLUTION INTRAVENOUS at 08:35

## 2022-12-31 RX ADMIN — AMLODIPINE BESYLATE 10 MG: 10 TABLET ORAL at 08:36

## 2022-12-31 RX ADMIN — CYANOCOBALAMIN TAB 500 MCG 1000 MCG: 500 TAB at 08:36

## 2022-12-31 RX ADMIN — Medication 1000 UNITS: at 08:35

## 2022-12-31 RX ADMIN — OMEGA-3 FATTY ACIDS CAP 1000 MG 1000 MG: 1000 CAP at 08:35

## 2022-12-31 RX ADMIN — CEFAZOLIN SODIUM 1000 MG: 1 SOLUTION INTRAVENOUS at 23:36

## 2022-12-31 RX ADMIN — MUPIROCIN 1 APPLICATION: 20 OINTMENT TOPICAL at 11:33

## 2022-12-31 RX ADMIN — SITAGLIPTIN 25 MG: 25 TABLET, FILM COATED ORAL at 08:35

## 2022-12-31 RX ADMIN — INSULIN LISPRO 1 UNITS: 100 INJECTION, SOLUTION INTRAVENOUS; SUBCUTANEOUS at 15:25

## 2022-12-31 RX ADMIN — TAMSULOSIN HYDROCHLORIDE 0.4 MG: 0.4 CAPSULE ORAL at 15:35

## 2022-12-31 RX ADMIN — INSULIN GLARGINE 20 UNITS: 100 INJECTION, SOLUTION SUBCUTANEOUS at 22:20

## 2022-12-31 RX ADMIN — MUPIROCIN 1 APPLICATION: 20 OINTMENT TOPICAL at 20:56

## 2022-12-31 RX ADMIN — INSULIN LISPRO 1 UNITS: 100 INJECTION, SOLUTION INTRAVENOUS; SUBCUTANEOUS at 22:20

## 2022-12-31 RX ADMIN — OMEGA-3 FATTY ACIDS CAP 1000 MG 1000 MG: 1000 CAP at 17:12

## 2022-12-31 NOTE — PLAN OF CARE
Problem: MOBILITY - ADULT  Goal: Maintain or return to baseline ADL function  Description: INTERVENTIONS:  -  Assess patient's ability to carry out ADLs; assess patient's baseline for ADL function and identify physical deficits which impact ability to perform ADLs (bathing, care of mouth/teeth, toileting, grooming, dressing, etc )  - Assess/evaluate cause of self-care deficits   - Assess range of motion  - Assess patient's mobility; develop plan if impaired  - Assess patient's need for assistive devices and provide as appropriate  - Encourage maximum independence but intervene and supervise when necessary  - Involve family in performance of ADLs  - Assess for home care needs following discharge   - Consider OT consult to assist with ADL evaluation and planning for discharge  - Provide patient education as appropriate  Outcome: Progressing  Goal: Maintains/Returns to pre admission functional level  Description: INTERVENTIONS:  - Perform BMAT or MOVE assessment daily    - Set and communicate daily mobility goal to care team and patient/family/caregiver  - Collaborate with rehabilitation services on mobility goals if consulted  - Perform Range of Motion  times a day  - Reposition patient every  hours    - Dangle patient  times a day  - Stand patient  times a day  - Ambulate patient  times a day  - Out of bed to chair  times a day   - Out of bed for meals  times a day  - Out of bed for toileting  - Record patient progress and toleration of activity level   Outcome: Progressing     Problem: Prexisting or High Potential for Compromised Skin Integrity  Goal: Skin integrity is maintained or improved  Description: INTERVENTIONS:  - Identify patients at risk for skin breakdown  - Assess and monitor skin integrity  - Assess and monitor nutrition and hydration status  - Monitor labs   - Assess for incontinence   - Turn and reposition patient  - Assist with mobility/ambulation  - Relieve pressure over bony prominences  - Avoid friction and shearing  - Provide appropriate hygiene as needed including keeping skin clean and dry  - Evaluate need for skin moisturizer/barrier cream  - Collaborate with interdisciplinary team   - Patient/family teaching  - Consider wound care consult   Outcome: Progressing     Problem: PAIN - ADULT  Goal: Verbalizes/displays adequate comfort level or baseline comfort level  Description: Interventions:  - Encourage patient to monitor pain and request assistance  - Assess pain using appropriate pain scale  - Administer analgesics based on type and severity of pain and evaluate response  - Implement non-pharmacological measures as appropriate and evaluate response  - Consider cultural and social influences on pain and pain management  - Notify physician/advanced practitioner if interventions unsuccessful or patient reports new pain  Outcome: Progressing     Problem: INFECTION - ADULT  Goal: Absence or prevention of progression during hospitalization  Description: INTERVENTIONS:  - Assess and monitor for signs and symptoms of infection  - Monitor lab/diagnostic results  - Monitor all insertion sites, i e  indwelling lines, tubes, and drains  - Monitor endotracheal if appropriate and nasal secretions for changes in amount and color  - [place] appropriate cooling/warming therapies per order  - Administer medications as ordered  - Instruct and encourage patient and family to use good hand hygiene technique  - Identify and instruct in appropriate isolation precautions for identified infection/condition  Outcome: Progressing  Goal: Absence of fever/infection during neutropenic period  Description: INTERVENTIONS:  - Monitor WBC    Outcome: Progressing     Problem: SAFETY ADULT  Goal: Maintain or return to baseline ADL function  Description: INTERVENTIONS:  -  Assess patient's ability to carry out ADLs; assess patient's baseline for ADL function and identify physical deficits which impact ability to perform ADLs (bathing, care of mouth/teeth, toileting, grooming, dressing, etc )  - Assess/evaluate cause of self-care deficits   - Assess range of motion  - Assess patient's mobility; develop plan if impaired  - Assess patient's need for assistive devices and provide as appropriate  - Encourage maximum independence but intervene and supervise when necessary  - Involve family in performance of ADLs  - Assess for home care needs following discharge   - Consider OT consult to assist with ADL evaluation and planning for discharge  - Provide patient education as appropriate  Outcome: Progressing  Goal: Maintains/Returns to pre admission functional level  Description: INTERVENTIONS:  - Perform BMAT or MOVE assessment daily    - Set and communicate daily mobility goal to care team and patient/family/caregiver  - Collaborate with rehabilitation services on mobility goals if consulted  - Perform Range of Motion  times a day  - Reposition patient every  hours    - Dangle patient  times a day  - Stand patient  times a day  - Ambulate patient  times a day  - Out of bed to chair  times a day   - Out of bed for meals  times a day  - Out of bed for toileting  - Record patient progress and toleration of activity level   Outcome: Progressing  Goal: Patient will remain free of falls  Description: INTERVENTIONS:  - Educate patient/family on patient safety including physical limitations  - Instruct patient to call for assistance with activity   - Consult OT/PT to assist with strengthening/mobility   - Keep Call bell within reach  - Keep bed low and locked with side rails adjusted as appropriate  - Keep care items and personal belongings within reach  - Initiate and maintain comfort rounds  - Make Fall Risk Sign visible to staff  - Offer Toileting every  Hours, in advance of need  - Initiate/Maintain alarm  - Obtain necessary fall risk management equipment:   - Apply yellow socks and bracelet for high fall risk patients  - Consider moving patient to room near nurses station  Outcome: Progressing     Problem: DISCHARGE PLANNING  Goal: Discharge to home or other facility with appropriate resources  Description: INTERVENTIONS:  - Identify barriers to discharge w/patient and caregiver  - Arrange for needed discharge resources and transportation as appropriate  - Identify discharge learning needs (meds, wound care, etc )  - Arrange for interpretive services to assist at discharge as needed  - Refer to Case Management Department for coordinating discharge planning if the patient needs post-hospital services based on physician/advanced practitioner order or complex needs related to functional status, cognitive ability, or social support system  Outcome: Progressing     Problem: Knowledge Deficit  Goal: Patient/family/caregiver demonstrates understanding of disease process, treatment plan, medications, and discharge instructions  Description: Complete learning assessment and assess knowledge base    Interventions:  - Provide teaching at level of understanding  - Provide teaching via preferred learning methods  Outcome: Progressing     Problem: Potential for Falls  Goal: Patient will remain free of falls  Description: INTERVENTIONS:  - Educate patient/family on patient safety including physical limitations  - Instruct patient to call for assistance with activity   - Consult OT/PT to assist with strengthening/mobility   - Keep Call bell within reach  - Keep bed low and locked with side rails adjusted as appropriate  - Keep care items and personal belongings within reach  - Initiate and maintain comfort rounds  - Make Fall Risk Sign visible to staff  - Offer Toileting every  Hours, in advance of need  - Initiate/Maintain alarm  - Obtain necessary fall risk management equipment:   - Apply yellow socks and bracelet for high fall risk patients  - Consider moving patient to room near nurses station  Outcome: Progressing

## 2022-12-31 NOTE — CASE MANAGEMENT
Case Management Assessment & Discharge Planning Note    Patient name Karlee Denton  Location [address] 207/2 [address] MRN [id number]  : 1940 Date 2022       Current Admission Date: 2022  Current Admission Diagnosis:Cellulitis   Patient Active Problem List    Diagnosis Date Noted   • Inflammatory arthritis    • Cellulitis 2022   • CAD (coronary artery disease) 2022   • CKD (chronic kidney disease) 2022   • BPH (benign prostatic hyperplasia) 2022   • Dyslipidemia 2022   • Chronic lung disease 2022   • Type 2 diabetes mellitus, with long-term current use of insulin ([address] ) 2022   • HTN (hypertension) 2022      LOS (days): 2  Geometric Mean LOS (GMLOS) (days): 3 20  Days to GMLOS:1     OBJECTIVE:    Risk of Unplanned Readmission Score: 14 11       Current admission status: Inpatient  Referral Reason: Other (Discharge planning)    Preferred Pharmacy:   [facility] [address] [place] [address]  [address]  Phone: [phone] Fax: [phone]    Primary Care Provider: Evangelist Pineda    Primary Insurance: MEDICARE  Secondary Insurance: [facility]    ASSESSMENT:  [address] Proxies    There are no active Health Care Proxies on file  Readmission Root Cause  30 Day Readmission: No    Patient Information  Admitted from[de-identified] Home  Mental Status: Confused, Alert  During Assessment patient was accompanied by: Spouse  Assessment information provided by[de-identified] Spouse  Primary Caregiver: Spouse  Caregiver's Name[de-identified] Magalys Pichardo (wife)  Caregiver's Relationship to Patient[de-identified] Family Member  Caregiver's Telephone Number[de-identified] [phone]  Support Systems: Spouse/significant other  [place] of Residence: [address] [place] [address] Street do you live in?: [address] [facility] entry access options   Select all that apply : Stairs  Number of steps to enter home : 3  Type of Current Residence: [address] [place] [address]  In the last 12 months, was there a time when you were not able to pay the mortgage or rent on time?: No  In the last 12 months, how many places have you lived?: 1  In the last 12 months, was there a time when you did not have a steady place to sleep or slept in a shelter (including now)?: No  Homeless/housing insecurity resource given?: N/A  Living Arrangements: Lives w/ Spouse/significant other  Is patient a ?: No    Activities of Daily Living Prior to Admission  Functional Status: Independent  Completes ADLs independently?: Yes  Ambulates independently?: Yes  Does patient use assisted devices?: Yes  Assisted Devices (DME) used: Straight Cane  Does patient currently own DME?: Yes  What DME does the patient currently own?: Straight Cane  Does patient have a history of Outpatient Therapy (PT/OT)?: No  Does the patient have a history of Short-Term Rehab?: Yes ([facility])  Does patient have a history of HHC?: Yes (Wife does not remember name of agency)  Does patient currently have [facility]?: No    Patient Information Continued  Income Source: Pension/shelter  Does patient have prescription coverage?: Yes  Within the past 12 months, you worried that your food would run out before you got the money to buy more : Never true  Within the past 12 months, the food you bought just didn't last and you didn't have money to get more : Never true  Food insecurity resource given?: N/A  Does patient receive dialysis treatments?: No  Does patient have a history of substance abuse?: No  Does patient have a history of Mental Health Diagnosis?: No    Means of Transportation  Means of Transport to [facility][de-identified] Family transport  In the past 12 months, has lack of transportation kept you from medical appointments or from getting medications?: No  In the past 12 months, has lack of transportation kept you from meetings, work, or from getting things needed for daily living?: No  Was application for public transport provided?: N/A    DISCHARGE DETAILS:    Discharge planning discussed with[de-identified] Wife Trace Inocencio of Choice: Yes     Comments - Freedom of Choice: FOC and recommendation for STR reviewed with wife Gabbi De La Garza at bedside  She is in agreement with recommendation for STR and expressed no preference for facility  She is in agreement with blanket referrals being placed to determine bed availability  SW placed referrals in [facility] contacted family/caregiver?: Yes  Were Treatment Team discharge recommendations reviewed with patient/caregiver?: Yes  Did patient/caregiver verbalize understanding of patient care needs?: N/A- going to facility  Were patient/caregiver advised of the risks associated with not following Treatment Team discharge recommendations?: Yes    Contacts  Patient Contacts: Myra Tate (wife)  Relationship to Patient[de-identified] Family  Contact Method:  In Person  Reason/Outcome: Emergency Contact, Discharge [address]         Is the patient interested in [address] at discharge?: No    DME Referral Provided  Referral made for DME?: No    Other Referral/Resources/Interventions Provided:  Interventions: Short Term Rehab    Would you like to participate in our [address] service program?  : No - Declined    Treatment Team Recommendation: Short Term Rehab  Discharge Destination Plan[de-identified] Short Term Rehab

## 2022-12-31 NOTE — PROGRESS NOTES
[facility] Progress Note  Patient: Elie Golden 80 y o  male   MRN: [id number]  PCP: Vladimir Urbina  Unit/Bed#: [address] [place] [address] Encounter: [id number]  Date Of Visit: 12/31/22    Problem List:    Principal Problem:    Cellulitis  Active Problems:    CAD (coronary artery disease)    CKD (chronic kidney disease)    Type 2 diabetes mellitus, with long-term current use of insulin (HCC)    HTN (hypertension)    BPH (benign prostatic hyperplasia)    Dyslipidemia    Chronic lung disease    Inflammatory arthritis      Assessment & Plan:    * Cellulitis  Assessment & Plan  Presented with 2-day history of worsening of left foot and ankle swelling erythema and pain  Reported fever at home, afebrile present  X-ray of the foot-No acute osseous abnormality  Evidence of first metatarsophalangeal joint osteoarthritis  Mild leukocytosis on presentation  CRP significantly elevated, uric acid normal  Denies any history of gout  Concerns of inflammatory arthritis/ pseudogout given pain with range of motion, doubt septic arthritis  Remains afebrile, normal white count  Continues to improve with IV antibiotics patient also had improvement in renal function    and received colchicine x1  · Continue IV cefazolin  · Service positive but patient is improving with IV cefazolin  · Follow-up blood cultures negative for 24 hours  · Monitor clinically  · Podiatry evaluation procedure, follow-up MRI  · PT/OT    HTN (hypertension)  Assessment & Plan  Elevated on admission, wife reports poor compliance to antihypertensive  Currently overall better but labile  · Resumed amlodipine, lisinopril, metoprolol  · Monitor blood pressure     Type 2 diabetes mellitus, with long-term current use of insulin [facility])  Assessment & Plan  Lab Results   Component Value Date    HGBA1C 7 0 (H) 12/30/2022       Recent Labs     12/30/22  1542 12/30/22  2057 12/31/22  0712 12/31/22  1121   POCGLU 132 249* 135 159*       Blood Sugar Average: Last 72 hrs:  (P) 162 25     monitor blood glucose trend , remains acceptable  Continue glargine, at reduced to 20 units nightly  Substitute Tradjenta to Januvia 25 mg daily  Insulin sliding scale    CKD (chronic kidney disease)  Assessment & Plan  Lab Results   Component Value Date    EGFR 41 12/31/2022    EGFR 45 12/30/2022    EGFR 30 12/29/2022    CREATININE 1 54 (H) 12/31/2022    CREATININE 1 43 (H) 12/30/2022    CREATININE 2 01 (H) 12/29/2022     Unknown baseline but creatinine was around 1 5-1 8 in 2018, presented with creatinine of 2 0  UA unremarkable except trace protein  Creatinine now remains at baseline  · We will continue to monitor  · [facility] records [facility]    CAD (coronary artery disease)  Assessment & Plan  History of CAD s/p LAD PCI with MCKENZIE in 2018  · Continue aspirin, metoprolol, fish oil      Chronic lung disease  Assessment & Plan  Reports history of chronic lung disease, unspecified  Details unknown  Chest x-ray-diffuse bilateral pulmonary parenchymal opacities  Denies any reports chronic shortness of breath, denies any acute symptoms  SARS-CoV-2 PCR negative  · Monitor    Dyslipidemia  Assessment & Plan  On fish oil    BPH (benign prostatic hyperplasia)  Assessment & Plan  On tamsulosin          VTE Pharmacologic Prophylaxis: VTE Score: 4 Moderate Risk (Score 3-4) - Pharmacological DVT Prophylaxis Ordered: enoxaparin (Lovenox)  Patient Centered Rounds: I performed bedside rounds with nursing staff today  Discussions with Specialists or Other Care Team Provider: *yes    Education and Discussions with Family / Patient: Updated  (wife) at bedside  Time Spent for Care: 30 minutes  More than 50% of total time spent on counseling and coordination of care as described above      Current Length of Stay: 2 day(s)  Current Patient Status: Inpatient   Certification Statement: The patient will continue to require additional inpatient hospital stay due to Left ankle cellulitis/swelling  Discharge Plan: Anticipate discharge in 24-48 hrs to rehab facility  Code Status: Level 1 - Full Code    Subjective:   Remains afebrile  Left ankle pain and mobility is improving  Does not offer any other complaints        Objective:     Vitals:   Temp (24hrs), Av 7 °F (37 1 °C), Min:98 °F (36 7 °C), Max:99 7 °F (37 6 °C)    Temp:  [98 °F (36 7 °C)-99 7 °F (37 6 °C)] 98 1 °F (36 7 °C)  HR:  [58-95] 63  Resp:  [18-19] 18  BP: ()/(68-89) 160/89  SpO2:  [95 %-96 %] 96 %  Body mass index is 24 14 kg/m²  Input and Output Summary (last 24 hours): Intake/Output Summary (Last 24 hours) at 2022 1325  Last data filed at 2022 0905  Gross per 24 hour   Intake 345 ml   Output 400 ml   Net -55 ml       Physical Exam:   Physical Exam  Constitutional:       General: He is not in acute distress  HENT:      Head: Normocephalic and atraumatic  Cardiovascular:      Rate and Rhythm: Normal rate  Pulmonary:      Effort: Pulmonary effort is normal  No respiratory distress  Breath sounds: No wheezing, rhonchi or rales  Chest:      Chest wall: No tenderness  Abdominal:      General: Bowel sounds are normal  There is no distension  Palpations: Abdomen is soft  Tenderness: There is no abdominal tenderness  There is no guarding or rebound  Musculoskeletal:      Comments: Improving swelling and tenderness over the left foot and ankle with improvement in range of motion   Skin:     General: Skin is warm and dry  Findings: Erythema (Over left foot is improving) present  No rash  Neurological:      Mental Status: He is alert  Mental status is at baseline  Cranial Nerves: No cranial nerve deficit        Comments: Forgetful         Additional Data:     Labs:  Results from last 7 days   Lab Units 22  0441 22  0454 22  0822   WBC Thousand/uL 7 31   < > 10 80*   HEMOGLOBIN g/dL 13 1   < > 15 2   HEMATOCRIT % 40 2   < > 47 5   PLATELETS Thousands/uL 248   < > 262   NEUTROS PCT %  --   --  81*   LYMPHS PCT %  --   --  9*   MONOS PCT %  --   --  8   EOS PCT %  --   --  1    < > = values in this interval not displayed  Results from last 7 days   Lab Units 12/31/22  0441 12/30/22  0454 12/29/22  0822   SODIUM mmol/L 139   < > 140   POTASSIUM mmol/L 4 1   < > 4 1   CHLORIDE mmol/L 103   < > 101   CO2 mmol/L 26   < > 28   BUN mg/dL 34*   < > 31*   CREATININE mg/dL 1 54*   < > 2 01*   ANION GAP mmol/L 10   < > 11   CALCIUM mg/dL 8 7   < > 9 5   ALBUMIN g/dL  --   --  3 4*   TOTAL BILIRUBIN mg/dL  --   --  0 92   ALK PHOS U/L  --   --  135*   ALT U/L  --   --  17   AST U/L  --   --  16   GLUCOSE RANDOM mg/dL 139   < > 234*    < > = values in this interval not displayed  Results from last 7 days   Lab Units 12/31/22  1121 12/31/22  0712 12/30/22  2057 12/30/22  1542 12/30/22  1121 12/30/22  0730 12/29/22  2043 12/29/22  1650   POC GLUCOSE mg/dl 159* 135 249* 132 165* 135 134 189*     Results from last 7 days   Lab Units 12/30/22  0454   HEMOGLOBIN A1C % 7 0*     Results from last 7 days   Lab Units 12/29/22  0822   LACTIC ACID mmol/L 1 9       Lines/Drains:  Invasive Devices     Peripheral Intravenous Line  Duration           Peripheral IV 12/29/22 Right Antecubital 2 days                      Imaging: Reviewed radiology reports from this admission including: chest xray and xray(s)    Recent Cultures (last 7 days):   Results from last 7 days   Lab Units 12/29/22  0835 12/29/22  4260   BLOOD CULTURE  No Growth at 24 hrs  No Growth at 24 hrs         Last 24 Hours Medication List:   Current Facility-Administered Medications   Medication Dose Route Frequency Provider Last Rate   • acetaminophen  650 mg Oral Q6H PRN Maya Mcdermott MD     • amLODIPine  10 mg Oral Daily Maya Mcdermott MD     • aspirin  81 mg Oral Daily Maya Mcdermott MD     • cefazolin  1,000 mg Intravenous Rich Baxter MD Stopped (12/31/22 8444)   • cholecalciferol  1,000 Units Oral Daily Tania Vaughan MD     • clobetasol   Topical BID Tania Vaughan MD     • vitamin B-12  1,000 mcg Oral Daily Tania Vaughan MD     • enoxaparin  30 mg Subcutaneous Daily Tania Vaughan MD     • fish oil  1,000 mg Oral BID Tania Vaughan MD     • insulin glargine  20 Units Subcutaneous HS Tania Vaughan MD     • insulin lispro  1-5 Units Subcutaneous TID AC Tania Vaughan MD     • insulin lispro  1-5 Units Subcutaneous HS Tania Vaughan MD     • lisinopril  5 mg Oral Daily Tania Vaughan MD     • magnesium gluconate  500 mg Oral Daily Tania Vaughan MD     • metoprolol succinate  50 mg Oral Daily Tania Vaughan MD     • mupirocin   Nasal Q12H Urban Collado MD     • sitaGLIPtin  25 mg Oral Daily Tania Vaughan MD     • tamsulosin  0 4 mg Oral Daily With Bolivar Stallworth MD          Today, Patient Was Seen By: Tania Vaughan MD    ** Please Note: "This note has been constructed using a voice recognition system  Therefore there may be syntax, spelling, and/or grammatical errors   Please call if you have any questions  "**

## 2023-01-01 LAB
ANION GAP SERPL CALCULATED.3IONS-SCNC: 9 MMOL/L (ref 4–13)
BUN SERPL-MCNC: 37 MG/DL (ref 5–25)
CALCIUM SERPL-MCNC: 8.8 MG/DL (ref 8.3–10.1)
CHLORIDE SERPL-SCNC: 102 MMOL/L (ref 96–108)
CO2 SERPL-SCNC: 25 MMOL/L (ref 21–32)
CREAT SERPL-MCNC: 1.81 MG/DL (ref 0.6–1.3)
ERYTHROCYTE [DISTWIDTH] IN BLOOD BY AUTOMATED COUNT: 13.2 % (ref 11.6–15.1)
GFR SERPL CREATININE-BSD FRML MDRD: 34 ML/MIN/1.73SQ M
GLUCOSE SERPL-MCNC: 129 MG/DL (ref 65–140)
GLUCOSE SERPL-MCNC: 137 MG/DL (ref 65–140)
GLUCOSE SERPL-MCNC: 138 MG/DL (ref 65–140)
GLUCOSE SERPL-MCNC: 147 MG/DL (ref 65–140)
GLUCOSE SERPL-MCNC: 147 MG/DL (ref 65–140)
HCT VFR BLD AUTO: 38.5 % (ref 36.5–49.3)
HGB BLD-MCNC: 12.3 G/DL (ref 12–17)
MCH RBC QN AUTO: 28.1 PG (ref 26.8–34.3)
MCHC RBC AUTO-ENTMCNC: 31.9 G/DL (ref 31.4–37.4)
MCV RBC AUTO: 88 FL (ref 82–98)
PLATELET # BLD AUTO: 252 THOUSANDS/UL (ref 149–390)
PMV BLD AUTO: 11.3 FL (ref 8.9–12.7)
POTASSIUM SERPL-SCNC: 4.2 MMOL/L (ref 3.5–5.3)
RBC # BLD AUTO: 4.37 MILLION/UL (ref 3.88–5.62)
SODIUM SERPL-SCNC: 136 MMOL/L (ref 135–147)
WBC # BLD AUTO: 8.28 THOUSAND/UL (ref 4.31–10.16)

## 2023-01-01 RX ADMIN — MUPIROCIN 1 APPLICATION: 20 OINTMENT TOPICAL at 09:36

## 2023-01-01 RX ADMIN — ASPIRIN 81 MG CHEWABLE TABLET 81 MG: 81 TABLET CHEWABLE at 09:35

## 2023-01-01 RX ADMIN — MUPIROCIN 1 APPLICATION: 20 OINTMENT TOPICAL at 22:20

## 2023-01-01 RX ADMIN — OMEGA-3 FATTY ACIDS CAP 1000 MG 1000 MG: 1000 CAP at 17:00

## 2023-01-01 RX ADMIN — INSULIN GLARGINE 20 UNITS: 100 INJECTION, SOLUTION SUBCUTANEOUS at 22:21

## 2023-01-01 RX ADMIN — METOPROLOL SUCCINATE 50 MG: 50 TABLET, EXTENDED RELEASE ORAL at 09:38

## 2023-01-01 RX ADMIN — CEFAZOLIN SODIUM 1000 MG: 1 SOLUTION INTRAVENOUS at 06:51

## 2023-01-01 RX ADMIN — OMEGA-3 FATTY ACIDS CAP 1000 MG 1000 MG: 1000 CAP at 09:35

## 2023-01-01 RX ADMIN — AMLODIPINE BESYLATE 10 MG: 10 TABLET ORAL at 09:38

## 2023-01-01 RX ADMIN — ENOXAPARIN SODIUM 30 MG: 30 INJECTION SUBCUTANEOUS at 09:35

## 2023-01-01 RX ADMIN — CEFAZOLIN SODIUM 1000 MG: 1 SOLUTION INTRAVENOUS at 23:33

## 2023-01-01 RX ADMIN — LISINOPRIL 5 MG: 5 TABLET ORAL at 09:38

## 2023-01-01 RX ADMIN — Medication 500 MG: at 09:36

## 2023-01-01 RX ADMIN — CLOBETASOL PROPIONATE 1 APPLICATION: 0.5 CREAM TOPICAL at 17:03

## 2023-01-01 RX ADMIN — CEFAZOLIN SODIUM 1000 MG: 1 SOLUTION INTRAVENOUS at 17:00

## 2023-01-01 RX ADMIN — Medication 1000 UNITS: at 09:35

## 2023-01-01 RX ADMIN — TAMSULOSIN HYDROCHLORIDE 0.4 MG: 0.4 CAPSULE ORAL at 17:00

## 2023-01-01 RX ADMIN — SITAGLIPTIN 25 MG: 25 TABLET, FILM COATED ORAL at 09:35

## 2023-01-01 RX ADMIN — ACETAMINOPHEN 650 MG: 325 TABLET, FILM COATED ORAL at 06:54

## 2023-01-01 RX ADMIN — CLOBETASOL PROPIONATE 1 APPLICATION: 0.5 CREAM TOPICAL at 09:35

## 2023-01-01 RX ADMIN — CYANOCOBALAMIN TAB 500 MCG 1000 MCG: 500 TAB at 09:35

## 2023-01-01 NOTE — PLAN OF CARE
Problem: MOBILITY - ADULT  Goal: Maintain or return to baseline ADL function  Description: INTERVENTIONS:  -  Assess patient's ability to carry out ADLs; assess patient's baseline for ADL function and identify physical deficits which impact ability to perform ADLs (bathing, care of mouth/teeth, toileting, grooming, dressing, etc )  - Assess/evaluate cause of self-care deficits   - Assess range of motion  - Assess patient's mobility; develop plan if impaired  - Assess patient's need for assistive devices and provide as appropriate  - Encourage maximum independence but intervene and supervise when necessary  - Involve family in performance of ADLs  - Assess for home care needs following discharge   - Consider OT consult to assist with ADL evaluation and planning for discharge  - Provide patient education as appropriate  Outcome: Progressing  Goal: Maintains/Returns to pre admission functional level  Description: INTERVENTIONS:  - Perform BMAT or MOVE assessment daily    - Set and communicate daily mobility goal to care team and patient/family/caregiver  - Collaborate with rehabilitation services on mobility goals if consulted  - Perform Range of Motion 2 times a day  - Reposition patient every 2 hours    - Dangle patient 2 times a day  - Stand patient 2 times a day  - Ambulate patient 2 times a day  - Out of bed to chair 2 times a day   - Out of bed for meals 2 times a day  - Out of bed for toileting  - Record patient progress and toleration of activity level   Outcome: Progressing     Problem: Prexisting or High Potential for Compromised Skin Integrity  Goal: Skin integrity is maintained or improved  Description: INTERVENTIONS:  - Identify patients at risk for skin breakdown  - Assess and monitor skin integrity  - Assess and monitor nutrition and hydration status  - Monitor labs   - Assess for incontinence   - Turn and reposition patient  - Assist with mobility/ambulation  - Relieve pressure over bony prominences  - Avoid friction and shearing  - Provide appropriate hygiene as needed including keeping skin clean and dry  - Evaluate need for skin moisturizer/barrier cream  - Collaborate with interdisciplinary team   - Patient/family teaching  - Consider wound care consult   Outcome: Progressing     Problem: PAIN - ADULT  Goal: Verbalizes/displays adequate comfort level or baseline comfort level  Description: Interventions:  - Encourage patient to monitor pain and request assistance  - Assess pain using appropriate pain scale  - Administer analgesics based on type and severity of pain and evaluate response  - Implement non-pharmacological measures as appropriate and evaluate response  - Consider cultural and social influences on pain and pain management  - Notify physician/advanced practitioner if interventions unsuccessful or patient reports new pain  Outcome: Progressing     Problem: INFECTION - ADULT  Goal: Absence or prevention of progression during hospitalization  Description: INTERVENTIONS:  - Assess and monitor for signs and symptoms of infection  - Monitor lab/diagnostic results  - Monitor all insertion sites, i e  indwelling lines, tubes, and drains  - Monitor endotracheal if appropriate and nasal secretions for changes in amount and color  - Reading appropriate cooling/warming therapies per order  - Administer medications as ordered  - Instruct and encourage patient and family to use good hand hygiene technique  - Identify and instruct in appropriate isolation precautions for identified infection/condition  Outcome: Progressing     Problem: SAFETY ADULT  Goal: Maintain or return to baseline ADL function  Description: INTERVENTIONS:  -  Assess patient's ability to carry out ADLs; assess patient's baseline for ADL function and identify physical deficits which impact ability to perform ADLs (bathing, care of mouth/teeth, toileting, grooming, dressing, etc )  - Assess/evaluate cause of self-care deficits - Assess range of motion  - Assess patient's mobility; develop plan if impaired  - Assess patient's need for assistive devices and provide as appropriate  - Encourage maximum independence but intervene and supervise when necessary  - Involve family in performance of ADLs  - Assess for home care needs following discharge   - Consider OT consult to assist with ADL evaluation and planning for discharge  - Provide patient education as appropriate  Outcome: Progressing  Goal: Maintains/Returns to pre admission functional level  Description: INTERVENTIONS:  - Perform BMAT or MOVE assessment daily    - Set and communicate daily mobility goal to care team and patient/family/caregiver  - Collaborate with rehabilitation services on mobility goals if consulted  - Perform Range of Motion 2 times a day  - Reposition patient every 2 hours    - Dangle patient 2 times a day  - Stand patient 2 times a day  - Ambulate patient 2 times a day  - Out of bed to chair 2 times a day   - Out of bed for meals 2 times a day  - Out of bed for toileting  - Record patient progress and toleration of activity level   Outcome: Progressing  Goal: Patient will remain free of falls  Description: INTERVENTIONS:  - Educate patient/family on patient safety including physical limitations  - Instruct patient to call for assistance with activity   - Consult OT/PT to assist with strengthening/mobility   - Keep Call bell within reach  - Keep bed low and locked with side rails adjusted as appropriate  - Keep care items and personal belongings within reach  - Initiate and maintain comfort rounds  - Make Fall Risk Sign visible to staff  - Offer Toileting every 2 Hours, in advance of need  - Initiate/Maintain bed alarm    - Apply yellow socks and bracelet for high fall risk patients  - Consider moving patient to room near nurses station  Outcome: Progressing     Problem: Potential for Falls  Goal: Patient will remain free of falls  Description: INTERVENTIONS:  - Educate patient/family on patient safety including physical limitations  - Instruct patient to call for assistance with activity   - Consult OT/PT to assist with strengthening/mobility   - Keep Call bell within reach  - Keep bed low and locked with side rails adjusted as appropriate  - Keep care items and personal belongings within reach  - Initiate and maintain comfort rounds  - Make Fall Risk Sign visible to staff  - Offer Toileting every 2 Hours, in advance of need  - Initiate/Maintain bed alarm  - Apply yellow socks and bracelet for high fall risk patients  - Consider moving patient to room near nurses station  Outcome: Progressing

## 2023-01-01 NOTE — PROGRESS NOTES
[facility] Progress Note  Patient: Mira Candelario 80 y o  male   MRN: [id number]  PCP: Bony Watts  Unit/Bed#: [address] [place] [address] Encounter: [id number]  Date Of Visit: 01/01/23    Problem List:    Principal Problem:    Cellulitis  Active Problems:    CAD (coronary artery disease)    CKD (chronic kidney disease)    Type 2 diabetes mellitus, with long-term current use of insulin (HCC)    HTN (hypertension)    BPH (benign prostatic hyperplasia)    Dyslipidemia    Chronic lung disease    Inflammatory arthritis      Assessment & Plan:    * Cellulitis  Assessment & Plan  Presented with 2-day history of worsening of left foot and ankle swelling erythema and pain  Reported fever at home, afebrile present  X-ray of the foot-No acute osseous abnormality  Evidence of first metatarsophalangeal joint osteoarthritis  Mild leukocytosis on presentation  CRP significantly elevated, uric acid normal  Denies any history of gout  Concerns of inflammatory arthritis/ pseudogout given pain with range of motion, doubt septic arthritis  MRI of the foot with nonspecific diffuse subcutaneous edema around the ankle  No evidence of joint effusion to suggest pseudogout or other joint involvement  Incidental moderate-sized Gruberi bursa  Remains afebrile, normal white count  Continues to improve clinically with IV antibiotics patient also had improvement in renal function  and received colchicine x1  · Continue IV cefazolin, possibly transition to p o  in a m    · MRSA screen positive but patient is improving with IV cefazolin  · Follow-up blood cultures negative so far  · Monitor clinically  · Podiatry evaluation appreciated, outpatient follow-up  · PT/OT    HTN (hypertension)  Assessment & Plan  Elevated on admission, wife reports poor compliance to antihypertensive  Currently overall better but labile  · Resumed amlodipine, lisinopril, metoprolol  · Monitor blood pressure     Type 2 diabetes mellitus, with long-term current use of insulin [facility])  Assessment & Plan  Lab Results   Component Value Date    HGBA1C 7 0 (H) 12/30/2022       Recent Labs     12/31/22  1121 12/31/22  1524 12/31/22  2111 01/01/23  0732   POCGLU 159* 165* 166* 147*       Blood Sugar Average: Last 72 hrs:  (P) 506 [id number]     monitor blood glucose trend , remains acceptable  Continue glargine, at reduced dose 20 units nightly  Substitute Tradjenta to Januvia 25 mg daily  Insulin sliding scale    CKD (chronic kidney disease)  Assessment & Plan  Lab Results   Component Value Date    EGFR 34 01/01/2023    EGFR 41 12/31/2022    EGFR 45 12/30/2022    CREATININE 1 81 (H) 01/01/2023    CREATININE 1 54 (H) 12/31/2022    CREATININE 1 43 (H) 12/30/2022     Unknown baseline but creatinine was around 1 5-1 8 in 2018, presented with creatinine of 2 0  UA unremarkable except trace protein  Creatinine now remains at baseline  · We will continue to monitor  · Follow-up records from [facility]    CAD (coronary artery disease)  Assessment & Plan  History of CAD s/p LAD PCI with [facility] in 2018  EKG sinus rhythm with PVCs  · Continue aspirin, metoprolol, fish oil  · Follow-up with primary cardiology after discharge      Chronic lung disease  Assessment & Plan  Reports history of chronic lung disease, unspecified  Evidence of pulmonary fibrosis/bronchiectasis based on records from [facility]  Chest x-ray-diffuse bilateral pulmonary parenchymal opacities  Denies any reports chronic shortness of breath, denies any acute symptoms  SARS-CoV-2 PCR negative  · Monitor    Dyslipidemia  Assessment & Plan  On fish oil    BPH (benign prostatic hyperplasia)  Assessment & Plan  On tamsulosin          VTE Pharmacologic Prophylaxis: VTE Score: 4 Moderate Risk (Score 3-4) - Pharmacological DVT Prophylaxis Ordered: enoxaparin (Lovenox)  Patient Centered Rounds: I performed bedside rounds with nursing staff today    Discussions with Specialists or Other Care Team Provider: *yes    Education and Discussions with Family / Patient: Updated  (wife) at bedside  Yesterday    Time Spent for Care: 30 minutes  More than 50% of total time spent on counseling and coordination of care as described above  Current Length of Stay: 3 day(s)  Current Patient Status: Inpatient   Certification Statement: The patient will continue to require additional inpatient hospital stay due to Left ankle cellulitis/swelling  Discharge Plan: Anticipate discharge in 24-48 hrs to rehab facility  Code Status: Level 1 - Full Code    Subjective:   Pain is improving  Remains afebrile  Does not offer any complaints        Objective:     Vitals:   Temp (24hrs), Av 7 °F (36 5 °C), Min:96 9 °F (36 1 °C), Max:98 2 °F (36 8 °C)    Temp:  [96 9 °F (36 1 °C)-98 2 °F (36 8 °C)] 97 8 °F (36 6 °C)  HR:  [59-75] 75  Resp:  [16-18] 18  BP: (106-123)/(65-81) 116/71  SpO2:  [93 %-94 %] 93 %  Body mass index is 24 14 kg/m²  Input and Output Summary (last 24 hours): Intake/Output Summary (Last 24 hours) at 2023 1019  Last data filed at 2023 0128  Gross per 24 hour   Intake 550 ml   Output 225 ml   Net 325 ml       Physical Exam:   Physical Exam  Constitutional:       General: He is not in acute distress  HENT:      Head: Normocephalic and atraumatic  Cardiovascular:      Rate and Rhythm: Normal rate  Pulmonary:      Effort: Pulmonary effort is normal  No respiratory distress  Breath sounds: No wheezing, rhonchi or rales  Chest:      Chest wall: No tenderness  Abdominal:      General: Bowel sounds are normal  There is no distension  Palpations: Abdomen is soft  Tenderness: There is no abdominal tenderness  There is no guarding or rebound  Musculoskeletal:      Comments: Improving swelling and tenderness over the left foot and ankle with improvement in range of motion   Skin:     General: Skin is warm and dry  Findings: Erythema (Left ankle is improving) present  No rash     Neurological:      Mental Status: He is alert  Mental status is at baseline  Cranial Nerves: No cranial nerve deficit  Additional Data:     Labs:  Results from last 7 days   Lab Units 01/01/23  0643 12/30/22  0454 12/29/22  0822   WBC Thousand/uL 8 28   < > 10 80*   HEMOGLOBIN g/dL 12 3   < > 15 2   HEMATOCRIT % 38 5   < > 47 5   PLATELETS Thousands/uL 252   < > 262   NEUTROS PCT %  --   --  81*   LYMPHS PCT %  --   --  9*   MONOS PCT %  --   --  8   EOS PCT %  --   --  1    < > = values in this interval not displayed  Results from last 7 days   Lab Units 01/01/23  0643 12/30/22  0454 12/29/22  0822   SODIUM mmol/L 136   < > 140   POTASSIUM mmol/L 4 2   < > 4 1   CHLORIDE mmol/L 102   < > 101   CO2 mmol/L 25   < > 28   BUN mg/dL 37*   < > 31*   CREATININE mg/dL 1 81*   < > 2 01*   ANION GAP mmol/L 9   < > 11   CALCIUM mg/dL 8 8   < > 9 5   ALBUMIN g/dL  --   --  3 4*   TOTAL BILIRUBIN mg/dL  --   --  0 92   ALK PHOS U/L  --   --  135*   ALT U/L  --   --  17   AST U/L  --   --  16   GLUCOSE RANDOM mg/dL 137   < > 234*    < > = values in this interval not displayed  Results from last 7 days   Lab Units 01/01/23  0732 12/31/22  2111 12/31/22  1524 12/31/22  1121 12/31/22  0712 12/30/22  2057 12/30/22  1542 12/30/22  1121 12/30/22  0730 12/29/22  2043 12/29/22  1650   POC GLUCOSE mg/dl 147* 166* 165* 159* 135 249* 132 165* 135 134 189*     Results from last 7 days   Lab Units 12/30/22  0454   HEMOGLOBIN A1C % 7 0*     Results from last 7 days   Lab Units 12/29/22  0822   LACTIC ACID mmol/L 1 9       Lines/Drains:  Invasive Devices     Peripheral Intravenous Line  Duration           Peripheral IV 12/31/22 Dorsal (posterior); Left Forearm <1 day                      Imaging: Reviewed radiology reports from this admission including: chest xray and xray(s)    Recent Cultures (last 7 days):   Results from last 7 days   Lab Units 12/29/22  0835 12/29/22  0015   BLOOD CULTURE  No Growth at 48 hrs  No Growth at 48 hrs         Last 24 Hours Medication List:   Current Facility-Administered Medications   Medication Dose Route Frequency Provider Last Rate   • acetaminophen  650 mg Oral Q6H PRN Jonh Arenas MD     • amLODIPine  10 mg Oral Daily Jonh Arenas MD     • aspirin  81 mg Oral Daily Jonh Arenas MD     • cefazolin  1,000 mg Intravenous Q8H Jonh Arenas MD 1,000 mg (01/01/23 8542)   • cholecalciferol  1,000 Units Oral Daily Jonh Arenas MD     • clobetasol   Topical BID Jonh Arenas MD     • vitamin B-12  1,000 mcg Oral Daily Jonh Arenas MD     • enoxaparin  30 mg Subcutaneous Daily Jonh Arenas MD     • fish oil  1,000 mg Oral BID Jonh Arenas MD     • insulin glargine  20 Units Subcutaneous HS Jonh Arenas MD     • insulin lispro  1-5 Units Subcutaneous TID AC Jonh Arenas MD     • insulin lispro  1-5 Units Subcutaneous HS Jonh Arenas MD     • lisinopril  5 mg Oral Daily Jonh Arenas MD     • magnesium gluconate  500 mg Oral Daily Jonh Arenas MD     • metoprolol succinate  50 mg Oral Daily Jonh Arenas MD     • mupirocin   Nasal Q12H Jayden Burkett MD     • sitaGLIPtin  25 mg Oral Daily Jonh Arenas MD     • tamsulosin  0 4 mg Oral Daily With Frieda Meehan MD          Today, Patient Was Seen By: Jonh Arenas MD    ** Please Note: "This note has been constructed using a voice recognition system  Therefore there may be syntax, spelling, and/or grammatical errors   Please call if you have any questions  "**

## 2023-01-01 NOTE — PLAN OF CARE
Problem: Prexisting or High Potential for Compromised Skin Integrity  Goal: Skin integrity is maintained or improved  Description: INTERVENTIONS:  - Identify patients at risk for skin breakdown  - Assess and monitor skin integrity  - Assess and monitor nutrition and hydration status  - Monitor labs   - Assess for incontinence   - Turn and reposition patient  - Assist with mobility/ambulation  - Relieve pressure over bony prominences  - Avoid friction and shearing  - Provide appropriate hygiene as needed including keeping skin clean and dry  - Evaluate need for skin moisturizer/barrier cream  - Collaborate with interdisciplinary team   - Patient/family teaching  - Consider wound care consult   Outcome: Progressing     Problem: PAIN - ADULT  Goal: Verbalizes/displays adequate comfort level or baseline comfort level  Description: Interventions:  - Encourage patient to monitor pain and request assistance  - Assess pain using appropriate pain scale  - Administer analgesics based on type and severity of pain and evaluate response  - Implement non-pharmacological measures as appropriate and evaluate response  - Consider cultural and social influences on pain and pain management  - Notify physician/advanced practitioner if interventions unsuccessful or patient reports new pain  Outcome: Progressing     Problem: MOBILITY - ADULT  Goal: Maintain or return to baseline ADL function  Description: INTERVENTIONS:  -  Assess patient's ability to carry out ADLs; assess patient's baseline for ADL function and identify physical deficits which impact ability to perform ADLs (bathing, care of mouth/teeth, toileting, grooming, dressing, etc )  - Assess/evaluate cause of self-care deficits   - Assess range of motion  - Assess patient's mobility; develop plan if impaired  - Assess patient's need for assistive devices and provide as appropriate  - Encourage maximum independence but intervene and supervise when necessary  - Involve family in performance of ADLs  - Assess for home care needs following discharge   - Consider OT consult to assist with ADL evaluation and planning for discharge  - Provide patient education as appropriate  Outcome: Progressing

## 2023-01-02 LAB
ANION GAP SERPL CALCULATED.3IONS-SCNC: 6 MMOL/L (ref 4–13)
BUN SERPL-MCNC: 35 MG/DL (ref 5–25)
CALCIUM SERPL-MCNC: 8.8 MG/DL (ref 8.3–10.1)
CHLORIDE SERPL-SCNC: 103 MMOL/L (ref 96–108)
CO2 SERPL-SCNC: 29 MMOL/L (ref 21–32)
CREAT SERPL-MCNC: 1.6 MG/DL (ref 0.6–1.3)
ERYTHROCYTE [DISTWIDTH] IN BLOOD BY AUTOMATED COUNT: 13.1 % (ref 11.6–15.1)
GFR SERPL CREATININE-BSD FRML MDRD: 39 ML/MIN/1.73SQ M
GLUCOSE SERPL-MCNC: 110 MG/DL (ref 65–140)
GLUCOSE SERPL-MCNC: 114 MG/DL (ref 65–140)
GLUCOSE SERPL-MCNC: 114 MG/DL (ref 65–140)
GLUCOSE SERPL-MCNC: 119 MG/DL (ref 65–140)
GLUCOSE SERPL-MCNC: 137 MG/DL (ref 65–140)
HCT VFR BLD AUTO: 40.8 % (ref 36.5–49.3)
HGB BLD-MCNC: 12.8 G/DL (ref 12–17)
MCH RBC QN AUTO: 27.9 PG (ref 26.8–34.3)
MCHC RBC AUTO-ENTMCNC: 31.4 G/DL (ref 31.4–37.4)
MCV RBC AUTO: 89 FL (ref 82–98)
PLATELET # BLD AUTO: 255 THOUSANDS/UL (ref 149–390)
PMV BLD AUTO: 10.8 FL (ref 8.9–12.7)
POTASSIUM SERPL-SCNC: 4.1 MMOL/L (ref 3.5–5.3)
RBC # BLD AUTO: 4.59 MILLION/UL (ref 3.88–5.62)
SODIUM SERPL-SCNC: 138 MMOL/L (ref 135–147)
WBC # BLD AUTO: 6.83 THOUSAND/UL (ref 4.31–10.16)

## 2023-01-02 RX ADMIN — LISINOPRIL 5 MG: 5 TABLET ORAL at 08:01

## 2023-01-02 RX ADMIN — CLOBETASOL PROPIONATE: 0.5 CREAM TOPICAL at 08:03

## 2023-01-02 RX ADMIN — ACETAMINOPHEN 650 MG: 325 TABLET, FILM COATED ORAL at 23:47

## 2023-01-02 RX ADMIN — INSULIN GLARGINE 20 UNITS: 100 INJECTION, SOLUTION SUBCUTANEOUS at 22:18

## 2023-01-02 RX ADMIN — CLOBETASOL PROPIONATE: 0.5 CREAM TOPICAL at 17:03

## 2023-01-02 RX ADMIN — TAMSULOSIN HYDROCHLORIDE 0.4 MG: 0.4 CAPSULE ORAL at 15:53

## 2023-01-02 RX ADMIN — METOPROLOL SUCCINATE 50 MG: 50 TABLET, EXTENDED RELEASE ORAL at 08:01

## 2023-01-02 RX ADMIN — ENOXAPARIN SODIUM 30 MG: 30 INJECTION SUBCUTANEOUS at 08:01

## 2023-01-02 RX ADMIN — Medication 500 MG: at 08:00

## 2023-01-02 RX ADMIN — AMLODIPINE BESYLATE 10 MG: 10 TABLET ORAL at 08:01

## 2023-01-02 RX ADMIN — MUPIROCIN 1 APPLICATION: 20 OINTMENT TOPICAL at 08:03

## 2023-01-02 RX ADMIN — CEFAZOLIN SODIUM 1000 MG: 1 SOLUTION INTRAVENOUS at 07:36

## 2023-01-02 RX ADMIN — MUPIROCIN 1 APPLICATION: 20 OINTMENT TOPICAL at 21:59

## 2023-01-02 RX ADMIN — OMEGA-3 FATTY ACIDS CAP 1000 MG 1000 MG: 1000 CAP at 08:00

## 2023-01-02 RX ADMIN — CYANOCOBALAMIN TAB 500 MCG 1000 MCG: 500 TAB at 08:00

## 2023-01-02 RX ADMIN — SITAGLIPTIN 25 MG: 25 TABLET, FILM COATED ORAL at 08:00

## 2023-01-02 RX ADMIN — CEFAZOLIN SODIUM 1000 MG: 1 SOLUTION INTRAVENOUS at 14:56

## 2023-01-02 RX ADMIN — CEFAZOLIN SODIUM 1000 MG: 1 SOLUTION INTRAVENOUS at 23:47

## 2023-01-02 RX ADMIN — Medication 1000 UNITS: at 08:00

## 2023-01-02 RX ADMIN — OMEGA-3 FATTY ACIDS CAP 1000 MG 1000 MG: 1000 CAP at 17:03

## 2023-01-02 RX ADMIN — ASPIRIN 81 MG CHEWABLE TABLET 81 MG: 81 TABLET CHEWABLE at 08:00

## 2023-01-02 NOTE — PLAN OF CARE
Problem: MOBILITY - ADULT  Goal: Maintain or return to baseline ADL function  Description: INTERVENTIONS:  -  Assess patient's ability to carry out ADLs; assess patient's baseline for ADL function and identify physical deficits which impact ability to perform ADLs (bathing, care of mouth/teeth, toileting, grooming, dressing, etc )  - Assess/evaluate cause of self-care deficits   - Assess range of motion  - Assess patient's mobility; develop plan if impaired  - Assess patient's need for assistive devices and provide as appropriate  - Encourage maximum independence but intervene and supervise when necessary  - Involve family in performance of ADLs  - Assess for home care needs following discharge   - Consider OT consult to assist with ADL evaluation and planning for discharge  - Provide patient education as appropriate  Outcome: Progressing  Goal: Maintains/Returns to pre admission functional level  Description: INTERVENTIONS:  - Perform BMAT or MOVE assessment daily    - Set and communicate daily mobility goal to care team and patient/family/caregiver  - Collaborate with rehabilitation services on mobility goals if consulted  - Perform Range of Motion 2 times a day  - Reposition patient every 2 hours    - Dangle patient 2 times a day  - Stand patient 2 times a day  - Ambulate patient 2 times a day  - Out of bed to chair 2 times a day   - Out of bed for meals 2 times a day  - Out of bed for toileting  - Record patient progress and toleration of activity level   Outcome: Progressing     Problem: Prexisting or High Potential for Compromised Skin Integrity  Goal: Skin integrity is maintained or improved  Description: INTERVENTIONS:  - Identify patients at risk for skin breakdown  - Assess and monitor skin integrity  - Assess and monitor nutrition and hydration status  - Monitor labs   - Assess for incontinence   - Turn and reposition patient  - Assist with mobility/ambulation  - Relieve pressure over bony prominences  - Avoid friction and shearing  - Provide appropriate hygiene as needed including keeping skin clean and dry  - Evaluate need for skin moisturizer/barrier cream  - Collaborate with interdisciplinary team   - Patient/family teaching  - Consider wound care consult   Outcome: Progressing     Problem: PAIN - ADULT  Goal: Verbalizes/displays adequate comfort level or baseline comfort level  Description: Interventions:  - Encourage patient to monitor pain and request assistance  - Assess pain using appropriate pain scale  - Administer analgesics based on type and severity of pain and evaluate response  - Implement non-pharmacological measures as appropriate and evaluate response  - Consider cultural and social influences on pain and pain management  - Notify physician/advanced practitioner if interventions unsuccessful or patient reports new pain  Outcome: Progressing     Problem: INFECTION - ADULT  Goal: Absence or prevention of progression during hospitalization  Description: INTERVENTIONS:  - Assess and monitor for signs and symptoms of infection  - Monitor lab/diagnostic results  - Monitor all insertion sites, i e  indwelling lines, tubes, and drains  - Monitor endotracheal if appropriate and nasal secretions for changes in amount and color  - [place] appropriate cooling/warming therapies per order  - Administer medications as ordered  - Instruct and encourage patient and family to use good hand hygiene technique  - Identify and instruct in appropriate isolation precautions for identified infection/condition  Outcome: Progressing     Problem: SAFETY ADULT  Goal: Maintain or return to baseline ADL function  Description: INTERVENTIONS:  -  Assess patient's ability to carry out ADLs; assess patient's baseline for ADL function and identify physical deficits which impact ability to perform ADLs (bathing, care of mouth/teeth, toileting, grooming, dressing, etc )  - Assess/evaluate cause of self-care deficits - Assess range of motion  - Assess patient's mobility; develop plan if impaired  - Assess patient's need for assistive devices and provide as appropriate  - Encourage maximum independence but intervene and supervise when necessary  - Involve family in performance of ADLs  - Assess for home care needs following discharge   - Consider OT consult to assist with ADL evaluation and planning for discharge  - Provide patient education as appropriate  Outcome: Progressing  Goal: Maintains/Returns to pre admission functional level  Description: INTERVENTIONS:  - Perform BMAT or MOVE assessment daily    - Set and communicate daily mobility goal to care team and patient/family/caregiver  - Collaborate with rehabilitation services on mobility goals if consulted  - Perform Range of Motion 2 times a day  - Reposition patient every 2 hours    - Dangle patient 2 times a day  - Stand patient 2 times a day  - Ambulate patient 2 times a day  - Out of bed to chair 2 times a day   - Out of bed for meals 2 times a day  - Out of bed for toileting  - Record patient progress and toleration of activity level   Outcome: Progressing  Goal: Patient will remain free of falls  Description: INTERVENTIONS:  - Educate patient/family on patient safety including physical limitations  - Instruct patient to call for assistance with activity   - Consult OT/PT to assist with strengthening/mobility   - Keep Call bell within reach  - Keep bed low and locked with side rails adjusted as appropriate  - Keep care items and personal belongings within reach  - Initiate and maintain comfort rounds  - Make Fall Risk Sign visible to staff  - Offer Toileting every 2 Hours, in advance of need  - Initiate/Maintain alarm  - Obtain necessary fall risk management equipment  - Apply yellow socks and bracelet for high fall risk patients  - Consider moving patient to room near nurses station  Outcome: Progressing     Problem: DISCHARGE PLANNING  Goal: Discharge to home or other facility with appropriate resources  Description: INTERVENTIONS:  - Identify barriers to discharge w/patient and caregiver  - Arrange for needed discharge resources and transportation as appropriate  - Identify discharge learning needs (meds, wound care, etc )  - Arrange for interpretive services to assist at discharge as needed  - Refer to Case Management Department for coordinating discharge planning if the patient needs post-hospital services based on physician/advanced practitioner order or complex needs related to functional status, cognitive ability, or social support system  Outcome: Progressing     Problem: Knowledge Deficit  Goal: Patient/family/caregiver demonstrates understanding of disease process, treatment plan, medications, and discharge instructions  Description: Complete learning assessment and assess knowledge base    Interventions:  - Provide teaching at level of understanding  - Provide teaching via preferred learning methods  Outcome: Progressing     Problem: Potential for Falls  Goal: Patient will remain free of falls  Description: INTERVENTIONS:  - Educate patient/family on patient safety including physical limitations  - Instruct patient to call for assistance with activity   - Consult OT/PT to assist with strengthening/mobility   - Keep Call bell within reach  - Keep bed low and locked with side rails adjusted as appropriate  - Keep care items and personal belongings within reach  - Initiate and maintain comfort rounds  - Make Fall Risk Sign visible to staff  - Offer Toileting every 2 Hours, in advance of need  - Initiate/Maintain alarm  - Obtain necessary fall risk management equipment  - Apply yellow socks and bracelet for high fall risk patients  - Consider moving patient to room near nurses station  Outcome: Progressing

## 2023-01-02 NOTE — PLAN OF CARE
Problem: MOBILITY - ADULT  Goal: Maintain or return to baseline ADL function  Description: INTERVENTIONS:  -  Assess patient's ability to carry out ADLs; assess patient's baseline for ADL function and identify physical deficits which impact ability to perform ADLs (bathing, care of mouth/teeth, toileting, grooming, dressing, etc )  - Assess/evaluate cause of self-care deficits   - Assess range of motion  - Assess patient's mobility; develop plan if impaired  - Assess patient's need for assistive devices and provide as appropriate  - Encourage maximum independence but intervene and supervise when necessary  - Involve family in performance of ADLs  - Assess for home care needs following discharge   - Consider OT consult to assist with ADL evaluation and planning for discharge  - Provide patient education as appropriate  Outcome: Progressing  Goal: Maintains/Returns to pre admission functional level  Description: INTERVENTIONS:  - Perform BMAT or MOVE assessment daily    - Set and communicate daily mobility goal to care team and patient/family/caregiver  - Collaborate with rehabilitation services on mobility goals if consulted  - Perform Range of Motion 2 times a day  - Reposition patient every 2 hours    - Dangle patient 2 times a day  - Stand patient 2 times a day  - Ambulate patient 2 times a day  - Out of bed to chair 2 times a day   - Out of bed for meals 2 times a day  - Out of bed for toileting  - Record patient progress and toleration of activity level   Outcome: Progressing     Problem: Prexisting or High Potential for Compromised Skin Integrity  Goal: Skin integrity is maintained or improved  Description: INTERVENTIONS:  - Identify patients at risk for skin breakdown  - Assess and monitor skin integrity  - Assess and monitor nutrition and hydration status  - Monitor labs   - Assess for incontinence   - Turn and reposition patient  - Assist with mobility/ambulation  - Relieve pressure over bony prominences  - Avoid friction and shearing  - Provide appropriate hygiene as needed including keeping skin clean and dry  - Evaluate need for skin moisturizer/barrier cream  - Collaborate with interdisciplinary team   - Patient/family teaching  - Consider wound care consult   Outcome: Progressing     Problem: PAIN - ADULT  Goal: Verbalizes/displays adequate comfort level or baseline comfort level  Description: Interventions:  - Encourage patient to monitor pain and request assistance  - Assess pain using appropriate pain scale  - Administer analgesics based on type and severity of pain and evaluate response  - Implement non-pharmacological measures as appropriate and evaluate response  - Consider cultural and social influences on pain and pain management  - Notify physician/advanced practitioner if interventions unsuccessful or patient reports new pain  Outcome: Progressing     Problem: INFECTION - ADULT  Goal: Absence or prevention of progression during hospitalization  Description: INTERVENTIONS:  - Assess and monitor for signs and symptoms of infection  - Monitor lab/diagnostic results  - Monitor all insertion sites, i e  indwelling lines, tubes, and drains  - Monitor endotracheal if appropriate and nasal secretions for changes in amount and color  - [place] appropriate cooling/warming therapies per order  - Administer medications as ordered  - Instruct and encourage patient and family to use good hand hygiene technique  - Identify and instruct in appropriate isolation precautions for identified infection/condition  Outcome: Progressing     Problem: SAFETY ADULT  Goal: Maintain or return to baseline ADL function  Description: INTERVENTIONS:  -  Assess patient's ability to carry out ADLs; assess patient's baseline for ADL function and identify physical deficits which impact ability to perform ADLs (bathing, care of mouth/teeth, toileting, grooming, dressing, etc )  - Assess/evaluate cause of self-care deficits - Assess range of motion  - Assess patient's mobility; develop plan if impaired  - Assess patient's need for assistive devices and provide as appropriate  - Encourage maximum independence but intervene and supervise when necessary  - Involve family in performance of ADLs  - Assess for home care needs following discharge   - Consider OT consult to assist with ADL evaluation and planning for discharge  - Provide patient education as appropriate  Outcome: Progressing  Goal: Maintains/Returns to pre admission functional level  Description: INTERVENTIONS:  - Perform BMAT or MOVE assessment daily    - Set and communicate daily mobility goal to care team and patient/family/caregiver  - Collaborate with rehabilitation services on mobility goals if consulted  - Perform Range of Motion 2 times a day  - Reposition patient every 2 hours    - Dangle patient 2 times a day  - Stand patient 2 times a day  - Ambulate patient 2 times a day  - Out of bed to chair 2 times a day   - Out of bed for meals 2 times a day  - Out of bed for toileting  - Record patient progress and toleration of activity level   Outcome: Progressing  Goal: Patient will remain free of falls  Description: INTERVENTIONS:  - Educate patient/family on patient safety including physical limitations  - Instruct patient to call for assistance with activity   - Consult OT/PT to assist with strengthening/mobility   - Keep Call bell within reach  - Keep bed low and locked with side rails adjusted as appropriate  - Keep care items and personal belongings within reach  - Initiate and maintain comfort rounds  - Make Fall Risk Sign visible to staff  - Offer Toileting every 2 Hours, in advance of need  - Initiate/Maintain bed alarm  - Obtain necessary fall risk management equipment: walker  - Apply yellow socks and bracelet for high fall risk patients  - Consider moving patient to room near nurses station  Outcome: Progressing     Problem: DISCHARGE PLANNING  Goal: Discharge to home or other facility with appropriate resources  Description: INTERVENTIONS:  - Identify barriers to discharge w/patient and caregiver  - Arrange for needed discharge resources and transportation as appropriate  - Identify discharge learning needs (meds, wound care, etc )  - Arrange for interpretive services to assist at discharge as needed  - Refer to Case Management Department for coordinating discharge planning if the patient needs post-hospital services based on physician/advanced practitioner order or complex needs related to functional status, cognitive ability, or social support system  Outcome: Progressing     Problem: Knowledge Deficit  Goal: Patient/family/caregiver demonstrates understanding of disease process, treatment plan, medications, and discharge instructions  Description: Complete learning assessment and assess knowledge base    Interventions:  - Provide teaching at level of understanding  - Provide teaching via preferred learning methods  Outcome: Progressing     Problem: Potential for Falls  Goal: Patient will remain free of falls  Description: INTERVENTIONS:  - Educate patient/family on patient safety including physical limitations  - Instruct patient to call for assistance with activity   - Consult OT/PT to assist with strengthening/mobility   - Keep Call bell within reach  - Keep bed low and locked with side rails adjusted as appropriate  - Keep care items and personal belongings within reach  - Initiate and maintain comfort rounds  - Make Fall Risk Sign visible to staff  - Offer Toileting every 2 Hours, in advance of need  - Initiate/Maintain bed alarm  - Obtain necessary fall risk management equipment: yellow socks   - Apply yellow socks and bracelet for high fall risk patients  - Consider moving patient to room near nurses station  Outcome: Progressing

## 2023-01-02 NOTE — PROGRESS NOTES
[facility] Progress Note  Patient: Terri Anthony 80 y o  male   MRN: [id number]  PCP: Lilly Camarillo  Unit/Bed#: [address] [place] [address] Encounter: [id number]  Date Of Visit: 01/02/23    Problem List:    Principal Problem:    Cellulitis  Active Problems:    CAD (coronary artery disease)    CKD (chronic kidney disease)    Type 2 diabetes mellitus, with long-term current use of insulin (HCC)    HTN (hypertension)    BPH (benign prostatic hyperplasia)    Dyslipidemia    Chronic lung disease    Inflammatory arthritis      Assessment & Plan:    * Cellulitis  Assessment & Plan  Presented with 2-day history of worsening of left foot and ankle swelling erythema and pain  Reported fever at home, afebrile present  X-ray of the foot-No acute osseous abnormality  Evidence of first metatarsophalangeal joint osteoarthritis  Mild leukocytosis on presentation  CRP significantly elevated, uric acid normal  Denies any history of gout  Concerns of inflammatory arthritis/ pseudogout given pain with range of motion, doubt septic arthritis  MRI of the foot with nonspecific diffuse subcutaneous edema around the ankle  No evidence of joint effusion to suggest pseudogout or other joint involvement  Incidental moderate-sized Gruberi bursa  Remains afebrile, normal white count  Continues to improve with IV antibiotics patient also had improvement in renal function    and received colchicine x1  · Continue IV cefazolin, will transition to p o  on discharge  · MRSA screen positive but patient is improving with IV cefazolin  · Follow-up blood cultures negative so far  · Monitor clinically  · Podiatry evaluation appreciated, outpatient follow-up  · PT/OT    HTN (hypertension)  Assessment & Plan  Elevated on admission, wife reports poor compliance to antihypertensive  Currently overall stable on current meds  · Continue amlodipine, lisinopril, metoprolol with holding parameters  · Monitor blood pressure     Type 2 diabetes mellitus, with long-term current use of insulin [facility])  Assessment & Plan  Lab Results   Component Value Date    HGBA1C 7 0 (H) 12/30/2022       Recent Labs     01/01/23  1103 01/01/23  1555 01/01/23 2057 01/02/23  0711   POCGLU 129 138 147* 114       Blood Sugar Average: Last 72 hrs:  (P) 057 [id number]     monitor blood glucose trend , remains acceptable  Continue glargine, at reduced dose 20 units nightly  Substitute Tradjenta to Januvia 25 mg daily  Insulin sliding scale    CKD (chronic kidney disease)  Assessment & Plan  Lab Results   Component Value Date    EGFR 39 01/02/2023    EGFR 34 01/01/2023    EGFR 41 12/31/2022    CREATININE 1 60 (H) 01/02/2023    CREATININE 1 81 (H) 01/01/2023    CREATININE 1 54 (H) 12/31/2022     Unknown baseline but creatinine was around 1 5-1 8 in 2018, presented with creatinine of 2 0  UA unremarkable except trace protein  Creatinine now remains at baseline  · Continue to monitor    CAD (coronary artery disease)  Assessment & Plan  History of CAD s/p LAD PCI with MCKENZIE in 2018  EKG sinus rhythm with PVCs  · Continue aspirin, metoprolol, fish oil  · Follow-up with primary cardiology after discharge      Chronic lung disease  Assessment & Plan  Reports history of chronic lung disease, unspecified  Evidence of pulmonary fibrosis/bronchiectasis based on records from [facility]  Chest x-ray-diffuse bilateral pulmonary parenchymal opacities  Denies any reports chronic shortness of breath, denies any acute symptoms  SARS-CoV-2 PCR negative  · Monitor    Dyslipidemia  Assessment & Plan  On fish oil    BPH (benign prostatic hyperplasia)  Assessment & Plan  On tamsulosin          VTE Pharmacologic Prophylaxis: VTE Score: 4 Moderate Risk (Score 3-4) - Pharmacological DVT Prophylaxis Ordered: enoxaparin (Lovenox)  Patient Centered Rounds: I performed bedside rounds with nursing staff today    Discussions with Specialists or Other Care Team Provider: *yes    Education and Discussions with Family / Patient: Attempted to update  (wife) via phone  Unable to contact  Time Spent for Care: 30 minutes  More than 50% of total time spent on counseling and coordination of care as described above  Current Length of Stay: 4 day(s)  Current Patient Status: Inpatient   Certification Statement: The patient will continue to require additional inpatient hospital stay due to Left ankle cellulitis/swelling  Discharge Plan: Anticipate discharge in 24-48 hrs to rehab facility  Code Status: Level 1 - Full Code    Subjective:     Lying in bed  Does not offer any complaints  Upon asking about ankle pain he reports that he forgot about it      Objective:     Vitals:   Temp (24hrs), Av °F (36 7 °C), Min:97 7 °F (36 5 °C), Max:98 4 °F (36 9 °C)    Temp:  [97 7 °F (36 5 °C)-98 4 °F (36 9 °C)] 97 9 °F (36 6 °C)  HR:  [60-76] 60  Resp:  [16-18] 16  BP: (108-139)/(67-89) 139/76  SpO2:  [92 %-97 %] 97 % on room air  Body mass index is 24 14 kg/m²  Input and Output Summary (last 24 hours): Intake/Output Summary (Last 24 hours) at 2023 1008  Last data filed at 2023 0949  Gross per 24 hour   Intake 50 ml   Output 625 ml   Net -575 ml       Physical Exam:   Physical Exam  Constitutional:       General: He is not in acute distress  HENT:      Head: Normocephalic and atraumatic  Cardiovascular:      Rate and Rhythm: Normal rate  Pulmonary:      Effort: Pulmonary effort is normal  No respiratory distress  Breath sounds: No wheezing, rhonchi or rales  Chest:      Chest wall: No tenderness  Abdominal:      General: Bowel sounds are normal  There is no distension  Palpations: Abdomen is soft  Tenderness: There is no abdominal tenderness  There is no guarding or rebound  Musculoskeletal:      Comments: Improving erythema, swelling and tenderness over the left foot and ankle with improvement in range of motion   Skin:     General: Skin is warm and dry  Findings: No rash     Neurological: Mental Status: He is alert  Mental status is at baseline  Cranial Nerves: No cranial nerve deficit  Additional Data:     Labs:  Results from last 7 days   Lab Units 01/02/23  0446 12/30/22 0454 12/29/22  0822   WBC Thousand/uL 6 83   < > 10 80*   HEMOGLOBIN g/dL 12 8   < > 15 2   HEMATOCRIT % 40 8   < > 47 5   PLATELETS Thousands/uL 255   < > 262   NEUTROS PCT %  --   --  81*   LYMPHS PCT %  --   --  9*   MONOS PCT %  --   --  8   EOS PCT %  --   --  1    < > = values in this interval not displayed  Results from last 7 days   Lab Units 01/02/23  0446 12/30/22 0454 12/29/22  0822   SODIUM mmol/L 138   < > 140   POTASSIUM mmol/L 4 1   < > 4 1   CHLORIDE mmol/L 103   < > 101   CO2 mmol/L 29   < > 28   BUN mg/dL 35*   < > 31*   CREATININE mg/dL 1 60*   < > 2 01*   ANION GAP mmol/L 6   < > 11   CALCIUM mg/dL 8 8   < > 9 5   ALBUMIN g/dL  --   --  3 4*   TOTAL BILIRUBIN mg/dL  --   --  0 92   ALK PHOS U/L  --   --  135*   ALT U/L  --   --  17   AST U/L  --   --  16   GLUCOSE RANDOM mg/dL 110   < > 234*    < > = values in this interval not displayed  Results from last 7 days   Lab Units 01/02/23  0711 01/01/23 2057 01/01/23  1555 01/01/23  1103 01/01/23  0732 12/31/22  2111 12/31/22  1524 12/31/22  1121 12/31/22  0712 12/30/22 2057 12/30/22  1542 12/30/22  1121   POC GLUCOSE mg/dl 114 147* 138 129 147* 166* 165* 159* 135 249* 132 165*     Results from last 7 days   Lab Units 12/30/22  0454   HEMOGLOBIN A1C % 7 0*     Results from last 7 days   Lab Units 12/29/22  0822   LACTIC ACID mmol/L 1 9       Lines/Drains:  Invasive Devices     Peripheral Intravenous Line  Duration           Peripheral IV 12/31/22 Dorsal (posterior); Left Forearm 1 day                      Imaging: Reviewed radiology reports from this admission including: chest xray and xray(s)    Recent Cultures (last 7 days):   Results from last 7 days   Lab Units 12/29/22  0835 12/29/22  4548   BLOOD CULTURE  No Growth at 72 hrs  No Growth at 72 hrs  Last 24 Hours Medication List:   Current Facility-Administered Medications   Medication Dose Route Frequency Provider Last Rate   • acetaminophen  650 mg Oral Q6H PRN Kathi Villanueva MD     • amLODIPine  10 mg Oral Daily Kathi Villanueva MD     • aspirin  81 mg Oral Daily Kathi Villanueva MD     • cefazolin  1,000 mg Intravenous Q8H Kathi Villanueva  mL/hr at 01/02/23 0081   • cholecalciferol  1,000 Units Oral Daily Kathi Villanueva MD     • clobetasol   Topical BID Kathi Villanueva MD     • vitamin B-12  1,000 mcg Oral Daily Kathi Villanueva MD     • enoxaparin  30 mg Subcutaneous Daily Kathi Villanueva MD     • fish oil  1,000 mg Oral BID Kathi Villanueva MD     • insulin glargine  20 Units Subcutaneous HS Kathi Villanueva MD     • insulin lispro  1-5 Units Subcutaneous TID AC Kathi Villanueva MD     • insulin lispro  1-5 Units Subcutaneous HS Kathi Villanueva MD     • lisinopril  5 mg Oral Daily Kathi Villanueva MD     • magnesium gluconate  500 mg Oral Daily Kathi Villanueva MD     • metoprolol succinate  50 mg Oral Daily Kathi Villanueva MD     • mupirocin   Nasal Q12H Tom Castro MD     • sitaGLIPtin  25 mg Oral Daily Kathi Villanueva MD     • tamsulosin  0 4 mg Oral Daily With Fredi Cervantes MD          Today, Patient Was Seen By: Kathi Villanueva MD    ** Please Note: "This note has been constructed using a voice recognition system  Therefore there may be syntax, spelling, and/or grammatical errors   Please call if you have any questions  "**

## 2023-01-03 ENCOUNTER — APPOINTMENT (INPATIENT)
Dept: RADIOLOGY | Facility: HOSPITAL | Age: 83
End: 2023-01-03

## 2023-01-03 VITALS
SYSTOLIC BLOOD PRESSURE: 121 MMHG | BODY MASS INDEX: 24.13 KG/M2 | DIASTOLIC BLOOD PRESSURE: 72 MMHG | WEIGHT: 188 LBS | HEART RATE: 53 BPM | OXYGEN SATURATION: 94 % | TEMPERATURE: 97.9 F | RESPIRATION RATE: 18 BRPM | HEIGHT: 74 IN

## 2023-01-03 LAB
ANION GAP SERPL CALCULATED.3IONS-SCNC: 8 MMOL/L (ref 4–13)
BACTERIA BLD CULT: NORMAL
BACTERIA BLD CULT: NORMAL
BUN SERPL-MCNC: 33 MG/DL (ref 5–25)
CALCIUM SERPL-MCNC: 8.7 MG/DL (ref 8.3–10.1)
CHLORIDE SERPL-SCNC: 104 MMOL/L (ref 96–108)
CO2 SERPL-SCNC: 26 MMOL/L (ref 21–32)
CREAT SERPL-MCNC: 1.51 MG/DL (ref 0.6–1.3)
ERYTHROCYTE [DISTWIDTH] IN BLOOD BY AUTOMATED COUNT: 13.1 % (ref 11.6–15.1)
GFR SERPL CREATININE-BSD FRML MDRD: 42 ML/MIN/1.73SQ M
GLUCOSE SERPL-MCNC: 120 MG/DL (ref 65–140)
GLUCOSE SERPL-MCNC: 123 MG/DL (ref 65–140)
GLUCOSE SERPL-MCNC: 127 MG/DL (ref 65–140)
GLUCOSE SERPL-MCNC: 131 MG/DL (ref 65–140)
HCT VFR BLD AUTO: 39.3 % (ref 36.5–49.3)
HGB BLD-MCNC: 12.7 G/DL (ref 12–17)
MCH RBC QN AUTO: 28.2 PG (ref 26.8–34.3)
MCHC RBC AUTO-ENTMCNC: 32.3 G/DL (ref 31.4–37.4)
MCV RBC AUTO: 87 FL (ref 82–98)
PLATELET # BLD AUTO: 274 THOUSANDS/UL (ref 149–390)
PMV BLD AUTO: 11.3 FL (ref 8.9–12.7)
POTASSIUM SERPL-SCNC: 4.1 MMOL/L (ref 3.5–5.3)
RBC # BLD AUTO: 4.5 MILLION/UL (ref 3.88–5.62)
SODIUM SERPL-SCNC: 138 MMOL/L (ref 135–147)
WBC # BLD AUTO: 6.14 THOUSAND/UL (ref 4.31–10.16)

## 2023-01-03 PROCEDURE — 0H9NXZZ DRAINAGE OF LEFT FOOT SKIN, EXTERNAL APPROACH: ICD-10-PCS | Performed by: PODIATRIST

## 2023-01-03 RX ADMIN — TAMSULOSIN HYDROCHLORIDE 0.4 MG: 0.4 CAPSULE ORAL at 16:23

## 2023-01-03 RX ADMIN — LISINOPRIL 5 MG: 5 TABLET ORAL at 09:45

## 2023-01-03 RX ADMIN — SITAGLIPTIN 25 MG: 25 TABLET, FILM COATED ORAL at 09:45

## 2023-01-03 RX ADMIN — AMLODIPINE BESYLATE 10 MG: 10 TABLET ORAL at 09:45

## 2023-01-03 RX ADMIN — ASPIRIN 81 MG CHEWABLE TABLET 81 MG: 81 TABLET CHEWABLE at 09:45

## 2023-01-03 RX ADMIN — MUPIROCIN 1 APPLICATION: 20 OINTMENT TOPICAL at 09:45

## 2023-01-03 RX ADMIN — CLOBETASOL PROPIONATE: 0.5 CREAM TOPICAL at 09:45

## 2023-01-03 RX ADMIN — CEFAZOLIN SODIUM 1000 MG: 1 SOLUTION INTRAVENOUS at 16:23

## 2023-01-03 RX ADMIN — Medication 1000 UNITS: at 09:45

## 2023-01-03 RX ADMIN — ENOXAPARIN SODIUM 30 MG: 30 INJECTION SUBCUTANEOUS at 09:45

## 2023-01-03 RX ADMIN — CEFAZOLIN SODIUM 1000 MG: 1 SOLUTION INTRAVENOUS at 09:45

## 2023-01-03 RX ADMIN — Medication 500 MG: at 09:45

## 2023-01-03 RX ADMIN — OMEGA-3 FATTY ACIDS CAP 1000 MG 1000 MG: 1000 CAP at 09:45

## 2023-01-03 RX ADMIN — METOPROLOL SUCCINATE 50 MG: 50 TABLET, EXTENDED RELEASE ORAL at 09:45

## 2023-01-03 RX ADMIN — CYANOCOBALAMIN TAB 500 MCG 1000 MCG: 500 TAB at 09:45

## 2023-01-03 NOTE — PROGRESS NOTES
-- Patient: Grant Slider  -- MRN: [id number]  -- Aidin Request ID: [id number]  -- Level of care reserved: Jhonathan Washburn  -- Partner Reserved: [facility], [phone]  -- Clinical needs requested:  -- Geography searched: 10 miles around [facility]  -- Start of Service:  -- Request sent: 5:05pm EST on 12/31/2022 by Mirlande Crisostomo  -- Partner reserved: 10:22am EST on 1/3/2023 by Jeff Feliz  -- Choice list shared: 10:13am EST on 1/3/2023 by Jeff Feliz

## 2023-01-03 NOTE — [place] UNIVERSAL TRANSFER FORM
[place] UNIV[place] TRAN[place]ER FORM  (ALL ITEMS MUST BE COMPLETED)    1  TRANSFER FROM: [address]      TRANSFER TO: [facility]    2  DATE OF TRANSFER: 1/3/2023                        TIME OF TRANSFER: 1800    3  PATIENT NAME: Elias Harry,        YOB: 1940                             GENDER: male    4  LANGUAGE:   English    5  PHYSICIAN NAME:  Sofia Mcintosh MD                   PHONE: [phone]    6  CODE STATUS: Level 1 - Full Code        Out of Hospital DNR Attached: No    7  :                                      :  Extended Emergency Contact Information  Primary Emergency Contact: Emilia Davidson  Address: [address] [place] [address] Phone: [phone]  Relation: Azeb Gage [address] Representative/Proxy:  No           Legal Guardian:  No             NAME OF:           HEALTH CARE REPRESENTATIVE/PROXY:                                         OR           LEGAL GUARDIAN, IF NOT :                                               PHONE:  (Day)           (Night)                        (Cell)    8  REASON FOR TRANSFER: Decreased strength;Decreased endurance; Impaired balance;Decreased mobility; Decreased cognition            V/S: /72 (BP Location: Left arm)   Pulse (!) 53   Temp 97 9 °F (36 6 °C) (Oral)   Resp 18   Ht 6' 2" (1 88 m)   Wt 85 3 kg (188 lb)   SpO2 94%   BMI 24 14 kg/m²           PAIN: None    9  PRIMARY DIAGNOSIS: Cellulitis      Secondary Diagnosis:         Pacemaker: No      Internal Defib: No          Mental Health Diagnosis (if Applicable):    10  RESTRAINTS: No     11  RESPIRATORY NEEDS: None    12  ISOLATION/PRECAUTION: MRSA    15  ALLERGY: Patient has no known allergies  14  SENSORY:       Vision Glasses, Hearing Good  and Speech Clear    15   SKIN CONDITION: Yes:  OtherResolving Cellulitis Left Foot    16  DIET: Special (describe)Diabetic    17  IV ACCESS: None    18  PERSONAL ITEMS SENT WITH PATIENT: Glasses    19  ATTACHED DOCUMENTS: MUST ATTACH CURRENT MEDICATION INFORMATION Face Sheet, Labs and Discharge Summary    20  AT RISK ALERTS:Falls        HARM TO: N/A    21  WEIGHT BEARING STATUS:         Left Leg: Limited        Right Leg: None    22  MENTAL STATUS:Alert, Oriented and Forgetful    23  FUNCTION:        Walk: With Help        Transfer: With Help        Toilet: With Help        Feed: Self    24  IMMUNIZATIONS/SCREENING:     There is no immunization history on file for this patient  25  BOWEL: Continent    26  BLADDER: Incontinent    27   SENDING FACILITY CONTACT: Familia Salmon                  Title: RN         Unit: [facility]        Phone: [phone] [address] (if known):        Title:        Unit:         Phone:         FORM PREFILLED BY (if applicable)       Title:       Unit:        Phone:         FORM COMPLETED BY Spencer Cowart RN      Title:       Phone:

## 2023-01-03 NOTE — CASE MANAGEMENT
Case Management Discharge Planning Note    Patient name Laurent Hanna  Location [address] [facility] [address] [facility] MRN [id number]  : 1940 Date 1/3/2023       Current Admission Date: 2022  Current Admission Diagnosis:Cellulitis   Patient Active Problem List    Diagnosis Date Noted   • Cellulitis of left ankle    • Pain of left foot    • Inflammatory arthritis    • Cellulitis 2022   • CAD (coronary artery disease) 2022   • CKD (chronic kidney disease) 2022   • BPH (benign prostatic hyperplasia) 2022   • Dyslipidemia 2022   • Chronic lung disease 2022   • Type 2 diabetes mellitus, with long-term current use of insulin ([address] ) 2022   • HTN (hypertension) 2022      LOS (days): 5  Geometric Mean LOS (GMLOS) (days): 3 20  Days to GMLOS:-1 7     OBJECTIVE:  Risk of Unplanned Readmission Score: 14 68     Current admission status: Inpatient   Preferred Pharmacy:   [facility] [address] [place] [address] [address] [place] [address]  [address]  Phone: [phone] Fax: [phone]    Primary Care Provider: Kelly Jones    Primary Insurance: MEDICARE  Secondary Insurance: [facility]    DISCHARGE DETAILS:    Discharge planning discussed with[de-identified] Wife, Mari Body of Choice: Yes  Comments - Freedom of Choice: SW following to assist with DCP  STR placement is planned  SW placed call to pt's wife to review plans, facility options and IMM  SW offered to provide wife with list of accepting facilities from [address] [place] [address] when she visited today  Per wife she is not sure due to weather if she will be coming in at all  SW offered to email Aidin list to wife but she said she does not have a computer  SW offered to review list over phone and wife was agreeable with that  Reviewed list with wife and wife requested rehab placement at [facility]    Per wife she prefers to stay in [facility] area and has heard positive things about the facility  [facility] reserved in [address]  Per Dr Alex Baker discharge is anticipated today  SW discussed discharge timeframe with wife  SW will follow to assist with transfer when ordered  CM contacted family/caregiver?: Yes  Were Treatment Team discharge recommendations reviewed with patient/caregiver?: Yes  Did patient/caregiver verbalize understanding of patient care needs?: N/A- going to facility  Were patient/caregiver advised of the risks associated with not following Treatment Team discharge recommendations?: Yes    Contacts  Patient Contacts: Reji Warren (wife)  Relationship to Patient[de-identified] Family  Contact Method: Phone  Phone Number: [phone]  Reason/Outcome: Discharge Planning    Other Referral/Resources/Interventions Provided:  Interventions: Short Term Rehab  Referral Comments: STR placement at [facility] is planned  Treatment Team Recommendation: Short Term Rehab  Discharge Destination Plan[de-identified] Short Term Rehab  Transport at Discharge : BLS Ambulance, Wheelchair [facility] (pending)    IMM Given (Date):: 01/03/23  IMM Given to[de-identified] Family (IMM reviewed with pt's wife over phone  Wife verbalized understanding and agrees with discharge determination  Offered copy, wife requested copy be left in pt's room    Copy also placed in scan bin for chart )

## 2023-01-03 NOTE — PHYSICAL THERAPY NOTE
PT TREATMENT     01/03/23 0933   Note Type   Note Type Treatment   Pain Assessment   Pain Assessment Tool 0-10   Pain Score No Pain   Restrictions/Precautions   Weight Bearing Precautions Per Order No   Other Precautions Cognitive; Chair Alarm; Bed Alarm; Fall Risk;Pain  (urinary incontinence)   General   Chart Reviewed Yes   Cognition   Overall Cognitive Status Impaired   Arousal/Participation Cooperative   Following Commands Follows one step commands with increased time or repetition   Subjective   Subjective agreeable to OOB with encouragement   Bed Mobility   Supine to Sit 5  Supervision   Sit to Supine 5  Supervision   Transfers   Sit to Stand 3  Moderate assistance   Stand to Sit 3  Moderate assistance   Stand pivot 3  Moderate assistance  (bed to chair and chair to bed)   Additional items   (with walker, posterior balance loss)   Ambulation/Elevation   Gait pattern Wide SIS; Improper Weight shift; Inconsistent rangel  (LOB posterior, unsteady)   Gait Assistance 3  Moderate assist   Assistive Device Rolling walker   Distance 10 feet;  activity limited by urinary incontinence  Pt stood x 3 minutes to finish urinating then assisted to chair and cleaned up  Balance   Static Sitting Fair +   Static Standing Fair -   Activity Tolerance   Activity Tolerance Patient limited by fatigue  (urinary incontinence)   Assessment   Prognosis Good   Problem List Decreased strength;Decreased endurance; Impaired balance;Decreased mobility; Decreased cognition   Assessment Pt agreed to activity and was able to walk for the first time but limited by urinary incontinence then pt needed to get back to bed for a foot x-ray and declined to get back out afterwards  RN noted pt is to be NWB L LE but this is not noted in chart so pt ambulated WBAT with walker  Recommend pt urinate before walking next session  The patient's AM-PAC Basic Mobility Inpatient Short Form Raw Score is 11   A Raw score of less than or equal to 16 suggests the patient may benefit from discharge to post-acute rehabilitation services  Plan   Treatment/Interventions Therapeutic exercise; Endurance training;LE strengthening/ROM; Functional transfer training;Gait training;Equipment eval/education;Patient/family training   Progress Progressing toward goals   PT Frequency Other (Comment)  (5x/w)   Recommendation   PT Discharge Recommendation Post acute rehabilitation services   AM-PAC Basic Mobility Inpatient   Turning in Bed Without Bedrails 3   Lying on Back to Sitting on Edge of Flat Bed 3   Moving Bed to Chair 2   Standing Up From Chair 2   Walk in Room 2   Climb 3-5 Stairs 1   Basic Mobility Inpatient Raw Score 13   Basic Mobility Standardized Score 33 99   Highest Level Of Mobility   -HLM Goal 4: Move to chair/commode   JH-HLM Achieved 6: Walk 10 steps or more   End of Consult   Patient Position at End of Consult All needs within reach;Bed/Chair alarm activated;Supine   Licensure   [place] License Number  Aminta Kaisernilsa PT [id number]

## 2023-01-03 NOTE — PLAN OF CARE
Problem: MOBILITY - ADULT  Goal: Maintain or return to baseline ADL function  Description: INTERVENTIONS:  -  Assess patient's ability to carry out ADLs; assess patient's baseline for ADL function and identify physical deficits which impact ability to perform ADLs (bathing, care of mouth/teeth, toileting, grooming, dressing, etc )  - Assess/evaluate cause of self-care deficits   - Assess range of motion  - Assess patient's mobility; develop plan if impaired  - Assess patient's need for assistive devices and provide as appropriate  - Encourage maximum independence but intervene and supervise when necessary  - Involve family in performance of ADLs  - Assess for home care needs following discharge   - Consider OT consult to assist with ADL evaluation and planning for discharge  - Provide patient education as appropriate  Outcome: Progressing     Problem: PAIN - ADULT  Goal: Verbalizes/displays adequate comfort level or baseline comfort level  Description: Interventions:  - Encourage patient to monitor pain and request assistance  - Assess pain using appropriate pain scale  - Administer analgesics based on type and severity of pain and evaluate response  - Implement non-pharmacological measures as appropriate and evaluate response  - Consider cultural and social influences on pain and pain management  - Notify physician/advanced practitioner if interventions unsuccessful or patient reports new pain  Outcome: Progressing     Problem: INFECTION - ADULT  Goal: Absence or prevention of progression during hospitalization  Description: INTERVENTIONS:  - Assess and monitor for signs and symptoms of infection  - Monitor lab/diagnostic results  - Monitor all insertion sites, i e  indwelling lines, tubes, and drains  - Monitor endotracheal if appropriate and nasal secretions for changes in amount and color  - Damon appropriate cooling/warming therapies per order  - Administer medications as ordered  - Instruct and encourage patient and family to use good hand hygiene technique  - Identify and instruct in appropriate isolation precautions for identified infection/condition  Outcome: Progressing

## 2023-01-03 NOTE — PLAN OF CARE
Problem: MOBILITY - ADULT  Goal: Maintain or return to baseline ADL function  Description: INTERVENTIONS:  -  Assess patient's ability to carry out ADLs; assess patient's baseline for ADL function and identify physical deficits which impact ability to perform ADLs (bathing, care of mouth/teeth, toileting, grooming, dressing, etc )  - Assess/evaluate cause of self-care deficits   - Assess range of motion  - Assess patient's mobility; develop plan if impaired  - Assess patient's need for assistive devices and provide as appropriate  - Encourage maximum independence but intervene and supervise when necessary  - Involve family in performance of ADLs  - Assess for home care needs following discharge   - Consider OT consult to assist with ADL evaluation and planning for discharge  - Provide patient education as appropriate  Outcome: Progressing  Goal: Maintains/Returns to pre admission functional level  Description: INTERVENTIONS:  - Perform BMAT or MOVE assessment daily    - Set and communicate daily mobility goal to care team and patient/family/caregiver  - Collaborate with rehabilitation services on mobility goals if consulted  - Perform Range of Motion 2 times a day  - Reposition patient every 2 hours    - Dangle patient 2 times a day  - Stand patient 2 times a day  - Ambulate patient 2 times a day  - Out of bed to chair 2 times a day   - Out of bed for meals 2 times a day  - Out of bed for toileting  - Record patient progress and toleration of activity level   Outcome: Progressing     Problem: Prexisting or High Potential for Compromised Skin Integrity  Goal: Skin integrity is maintained or improved  Description: INTERVENTIONS:  - Identify patients at risk for skin breakdown  - Assess and monitor skin integrity  - Assess and monitor nutrition and hydration status  - Monitor labs   - Assess for incontinence   - Turn and reposition patient  - Assist with mobility/ambulation  - Relieve pressure over bony prominences  - Avoid friction and shearing  - Provide appropriate hygiene as needed including keeping skin clean and dry  - Evaluate need for skin moisturizer/barrier cream  - Collaborate with interdisciplinary team   - Patient/family teaching  - Consider wound care consult   Outcome: Progressing     Problem: PAIN - ADULT  Goal: Verbalizes/displays adequate comfort level or baseline comfort level  Description: Interventions:  - Encourage patient to monitor pain and request assistance  - Assess pain using appropriate pain scale  - Administer analgesics based on type and severity of pain and evaluate response  - Implement non-pharmacological measures as appropriate and evaluate response  - Consider cultural and social influences on pain and pain management  - Notify physician/advanced practitioner if interventions unsuccessful or patient reports new pain  Outcome: Progressing     Problem: INFECTION - ADULT  Goal: Absence or prevention of progression during hospitalization  Description: INTERVENTIONS:  - Assess and monitor for signs and symptoms of infection  - Monitor lab/diagnostic results  - Monitor all insertion sites, i e  indwelling lines, tubes, and drains  - Monitor endotracheal if appropriate and nasal secretions for changes in amount and color  - [place] appropriate cooling/warming therapies per order  - Administer medications as ordered  - Instruct and encourage patient and family to use good hand hygiene technique  - Identify and instruct in appropriate isolation precautions for identified infection/condition  Outcome: Progressing     Problem: SAFETY ADULT  Goal: Maintain or return to baseline ADL function  Description: INTERVENTIONS:  -  Assess patient's ability to carry out ADLs; assess patient's baseline for ADL function and identify physical deficits which impact ability to perform ADLs (bathing, care of mouth/teeth, toileting, grooming, dressing, etc )  - Assess/evaluate cause of self-care deficits - Assess range of motion  - Assess patient's mobility; develop plan if impaired  - Assess patient's need for assistive devices and provide as appropriate  - Encourage maximum independence but intervene and supervise when necessary  - Involve family in performance of ADLs  - Assess for home care needs following discharge   - Consider OT consult to assist with ADL evaluation and planning for discharge  - Provide patient education as appropriate  Outcome: Progressing  Goal: Maintains/Returns to pre admission functional level  Description: INTERVENTIONS:  - Perform BMAT or MOVE assessment daily    - Set and communicate daily mobility goal to care team and patient/family/caregiver  - Collaborate with rehabilitation services on mobility goals if consulted  - Perform Range of Motion 2 times a day  - Reposition patient every 2 hours    - Dangle patient 2 times a day  - Stand patient 2 times a day  - Ambulate patient 2 times a day  - Out of bed to chair 2 times a day   - Out of bed for meals 2 times a day  - Out of bed for toileting  - Record patient progress and toleration of activity level   Outcome: Progressing  Goal: Patient will remain free of falls  Description: INTERVENTIONS:  - Educate patient/family on patient safety including physical limitations  - Instruct patient to call for assistance with activity   - Consult OT/PT to assist with strengthening/mobility   - Keep Call bell within reach  - Keep bed low and locked with side rails adjusted as appropriate  - Keep care items and personal belongings within reach  - Initiate and maintain comfort rounds  - Make Fall Risk Sign visible to staff  - Offer Toileting every 2 Hours, in advance of need  - Initiate/Maintain bed alarm    - Apply yellow socks and bracelet for high fall risk patients  - Consider moving patient to room near nurses station  Outcome: Progressing     Problem: DISCHARGE PLANNING  Goal: Discharge to home or other facility with appropriate resources  Description: INTERVENTIONS:  - Identify barriers to discharge w/patient and caregiver  - Arrange for needed discharge resources and transportation as appropriate  - Identify discharge learning needs (meds, wound care, etc )  - Arrange for interpretive services to assist at discharge as needed  - Refer to Case Management Department for coordinating discharge planning if the patient needs post-hospital services based on physician/advanced practitioner order or complex needs related to functional status, cognitive ability, or social support system  Outcome: Progressing

## 2023-01-03 NOTE — PROGRESS NOTES
Progress Note - Podiatry  Terri Anthony 80 y o  male MRN: [id number]  Unit/Bed#: [address] [place] [address] Encounter: [id number]    Assessment:  Small abscess proximal nail fold third left toe  Rule out osteomyelitis  Resolving cellulitis left foot  Severe mycosis of nail  Plan:  Chart reviewed  Patient examined  Today stab incision performed at the base of the proximal phalanx in the area of the posterior nail fold third left toe  Small amount of serosanguineous exudate noted  Culture and sensitivity done  At this time it appears the patient had cellulitis of the left foot and ankle secondary to abscess of toe  Start wound care  Continue antibiosis because it appears to be eradicating infection  X-rays ordered to rule out osteomyelitis  Subjective/Objective   Chief Complaint:   Chief Complaint   Patient presents with   • Foot Pain     Pt reports of l foot pain        Subjective: Patient is doing better  He however he still has pain in his left foot    Blood pressure 121/72, pulse (!) 53, temperature 97 9 °F (36 6 °C), temperature source Oral, resp  rate 18, height 6' 2" (1 88 m), weight 85 3 kg (188 lb), SpO2 94 %  ,Body mass index is 24 14 kg/m²  Invasive Devices     Peripheral Intravenous Line  Duration           Peripheral IV 01/03/23 Dorsal (posterior); Left Forearm <1 day                Physical Exam:   General appearance: alert, cooperative and no distress  Neuro/Vascular: Patient has decreased pulses bilateral   Extremity: Left foot demonstrates significant decrease in edema and erythema as noted upon admission photographs  Pain with palpation nail third left toe  There is some fluctuance noted at the posterior nail fold  Stab incision performed  Serosanguineous exudate noted  Negative evidence of occult lysis of nail however there is significant mycosis of nails of left foot  Rule out osteomyelitis                Labs and other studies:   CBC w/diff  Results from last 7 days   Lab Units 01/03/23  0557 12/30/22  0454 12/29/22  0822   WBC Thousand/uL 6 14   < > 10 80*   HEMOGLOBIN g/dL 12 7   < > 15 2   HEMATOCRIT % 39 3   < > 47 5   PLATELETS Thousands/uL 274   < > 262   NEUTROS PCT %  --   --  81*   LYMPHS PCT %  --   --  9*   MONOS PCT %  --   --  8   EOS PCT %  --   --  1    < > = values in this interval not displayed  BMP  Results from last 7 days   Lab Units 01/03/23  0557   POTASSIUM mmol/L 4 1   CHLORIDE mmol/L 104   CO2 mmol/L 26   BUN mg/dL 33*   CREATININE mg/dL 1 51*   CALCIUM mg/dL 8 7     CMP  Results from last 7 days   Lab Units 01/03/23  0557 12/30/22  0454 12/29/22  0822   POTASSIUM mmol/L 4 1   < > 4 1   CHLORIDE mmol/L 104   < > 101   CO2 mmol/L 26   < > 28   BUN mg/dL 33*   < > 31*   CREATININE mg/dL 1 51*   < > 2 01*   CALCIUM mg/dL 8 7   < > 9 5   ALK PHOS U/L  --   --  135*   ALT U/L  --   --  17   AST U/L  --   --  16    < > = values in this interval not displayed  @Culture@  Lab Results   Component Value Date    BLOODCX No Growth After 4 Days  12/29/2022    BLOODCX No Growth After 4 Days   12/29/2022         Current Facility-Administered Medications:   •  acetaminophen (TYLENOL) tablet 650 mg, 650 mg, Oral, Q6H PRN, Ceasar Rubinstein, MD, 650 mg at 01/02/23 2347  •  amLODIPine (NORVASC) tablet 10 mg, 10 mg, Oral, Daily, Ceasar Rubinstein, MD, 10 mg at 01/02/23 0801  •  aspirin chewable tablet 81 mg, 81 mg, Oral, Daily, Ceasar Rubinstein, MD, 81 mg at 01/02/23 0800  •  ceFAZolin (ANCEF) IVPB (premix in dextrose) 1,000 mg 50 mL, 1,000 mg, Intravenous, Q8H, Ceasar Rubinstein, MD, Last Rate: 100 mL/hr at 01/02/23 2347, 1,000 mg at 01/02/23 2347  •  cholecalciferol (VITAMIN D3) tablet 1,000 Units, 1,000 Units, Oral, Daily, Ceasar Rubinstein, MD, 1,000 Units at 01/02/23 0800  •  clobetasol (TEMOVATE) 0 05 % cream, , Topical, BID, Ceasar Rubinstein, MD, Given at 01/02/23 1703  •  cyanocobalamin (VITAMIN B-12) tablet 1,000 mcg, 1,000 mcg, Oral, Daily, Ceasar Rubinstein, MD, 1,000 mcg at 01/02/23 0800  •  enoxaparin (LOVENOX) subcutaneous injection 30 mg, 30 mg, Subcutaneous, Daily, Jayy Andrea MD, 30 mg at 01/02/23 0801  •  fish oil capsule 1,000 mg, 1,000 mg, Oral, BID, Jayy Andrea MD, 1,000 mg at 01/02/23 1703  •  insulin glargine (LANTUS) subcutaneous injection 20 Units 0 2 mL, 20 Units, Subcutaneous, HS, Jayy Andrea MD, 20 Units at 01/02/23 2218  •  insulin lispro (HumaLOG) 100 units/mL subcutaneous injection 1-5 Units, 1-5 Units, Subcutaneous, TID AC, 1 Units at 12/31/22 1525 **AND** Fingerstick Glucose (POCT), , , TID AC, Jayy Andrea MD  •  insulin lispro (HumaLOG) 100 units/mL subcutaneous injection 1-5 Units, 1-5 Units, Subcutaneous, HS, Jayy Andrea MD, 1 Units at 12/31/22 2220  •  lisinopril (ZESTRIL) tablet 5 mg, 5 mg, Oral, Daily, Jayy Andrea MD, 5 mg at 01/02/23 0801  •  magnesium gluconate (MAGONATE) tablet 500 mg, 500 mg, Oral, Daily, Jayy Andrea MD, 500 mg at 01/02/23 0800  •  metoprolol succinate (TOPROL-XL) 24 hr tablet 50 mg, 50 mg, Oral, Daily, Jayy Andrea MD, 50 mg at 01/02/23 0801  •  mupirocin (BACTROBAN) 2 % nasal ointment, , Nasal, Q12H [facility], Jayy Andrea MD, 1 application at 21/23/16 2159  •  sitaGLIPtin (JANUVIA) tablet 25 mg, 25 mg, Oral, Daily, Jayy Andrea MD, 25 mg at 01/02/23 0800  •  tamsulosin (FLOMAX) capsule 0 4 mg, 0 4 mg, Oral, Daily With Retha Meigs, MD, 0 4 mg at 01/02/23 1553    Imaging: I have personally reviewed pertinent films in PACS  EKG, Pathology, and Other Studies: I have personally reviewed pertinent reports  VTE Pharmacologic Prophylaxis: Sequential compression device (Venodyne)   VTE Mechanical Prophylaxis: sequential compression device          Counseling and Coordination of care  I spent 25 minutes face-to-face with patient today  More than 50% was spent in counseling & coordination of care  Counseled patient regarding need for continued antibiosis and wound care  Kai Cohen, LB

## 2023-01-03 NOTE — CASE MANAGEMENT
Case Management Discharge Planning Note    Patient name Sharlene Powers  [facility] [address] [facility] [address] [facility] [place] MRN [id number]  : 1940 Date 1/3/2023       Current Admission Date: 2022  Current Admission Diagnosis:Cellulitis   Patient Active Problem List    Diagnosis Date Noted   • Cellulitis of left ankle    • Pain of left foot    • Inflammatory arthritis    • Cellulitis 2022   • CAD (coronary artery disease) 2022   • CKD (chronic kidney disease) 2022   • BPH (benign prostatic hyperplasia) 2022   • Dyslipidemia 2022   • Chronic lung disease 2022   • Type 2 diabetes mellitus, with long-term current use of insulin ([facility] [address] ) 2022   • HTN (hypertension) 2022      LOS (days): 5  Geometric Mean LOS (GMLOS) (days): 3 20  Days to GMLOS:-2     OBJECTIVE:  Risk of Unplanned Readmission Score: 14 76     Current admission status: Inpatient   Preferred Pharmacy:   [facility], [address] [place] [address] [address]  [address] [place] [address]  [id number]  Phone: [phone] Fax: [phone]    Primary Care Provider: Jl Edmondson    Primary Insurance: MEDICARE  Secondary Insurance: [facility]    DISCHARGE DETAILS:    Discharge planning discussed with[de-identified] Patient and wife, Edgar Camargo of Choice: Yes  Comments - Freedom of Choice: Discharge ordered  Pt will be transferred to [facility] for skilled rehab as requested  SW reviewed plan and transportation time with pt and wife  CM contacted family/caregiver?: Yes    Contacts  Patient Contacts:  Isaías Acosta (wife)  Relationship to Patient[de-identified] Family  Contact Method: Phone  Phone Number: [phone]  Reason/Outcome: Discharge Planning    Other Referral/Resources/Interventions Provided:  Interventions: Short Term Rehab  Referral Comments: STR placement at [facility] is planned    Treatment Team Recommendation: Short Term Rehab  Discharge Destination Plan[de-identified] Short Term Rehab  Transport at Discharge : [facility]  Dispatcher Contacted: Yes  Number/Name of Dispatcher: Roundtrip  Transported by [employer] and Unit #): Antonieta  ETA of Transport (Date): 01/03/23  ETA of Transport (Time): Alivia GOMES  6  Name, Tri[address]ita 41 : [facility] [place], [place]  Receiving Facility/Agency Phone Number: [phone]

## 2023-01-03 NOTE — DISCHARGE SUMMARY
Leah 45  Discharge- Brit Owen 1940, 80 y o  male MRN: [id number]  Unit/Bed#: [facility] Encounter: [id number]  Primary Care Provider: Yesenia Ritchie   Date and time admitted to hospital: 12/29/2022  8:14 AM    * Cellulitis  Assessment & Plan  Presented with 2-day history of worsening of left foot and ankle swelling erythema and pain  Reported fever at home, afebrile present  X-ray of the foot-No acute osseous abnormality  Evidence of first metatarsophalangeal joint osteoarthritis  Mild leukocytosis on presentation  CRP significantly elevated, uric acid normal  Denies any history of gout  Concerns of inflammatory arthritis/ pseudogout given pain with range of motion, doubt septic arthritis  MRI of the foot with nonspecific diffuse subcutaneous edema around the ankle  No evidence of joint effusion to suggest pseudogout or other joint involvement  Incidental moderate-sized Gruberi bursa  Remains afebrile, normal white count  Continues to improve with IV antibiotics patient also had improvement in renal function  and received colchicine x1  · Continue IV cefazolin, will transition to p o  on discharge  · MRSA screen positive but patient is improving with IV cefazolin  · Follow-up blood cultures negative so far  · Monitor clinically  · Podiatry evaluation appreciated, outpatient follow-up  · Podiatry recs: Today stab incision performed at the base of the proximal phalanx in the area of the posterior nail fold third left toe  Small amount of serosanguineous exudate noted  Culture and sensitivity done  At this time it appears the patient had cellulitis of the left foot and ankle secondary to abscess of toe  Start wound care  Continue antibiosis because it appears to be eradicating infection  X-rays ordered to rule out osteomyelitis    · Will follow-up with podiatry outpatient    HTN (hypertension)  Assessment & Plan  Elevated on admission, wife reports poor compliance to antihypertensive  Currently overall stable on current meds  · Continue amlodipine, lisinopril, metoprolol with holding parameters  · Monitor blood pressure     Type 2 diabetes mellitus, with long-term current use of insulin [facility])  Assessment & Plan  Lab Results   Component Value Date    HGBA1C 7 0 (H) 12/30/2022       Recent Labs     01/01/23  1103 01/01/23  1555 01/01/23  2057 01/02/23  0711   POCGLU 129 138 147* 114       Blood Sugar Average: Last 72 hrs:  (P) 768 [id number]     Continue home regimen    Chronic lung disease  Assessment & Plan  Reports history of chronic lung disease, unspecified  Evidence of pulmonary fibrosis/bronchiectasis based on records from [facility]  Chest x-ray-diffuse bilateral pulmonary parenchymal opacities    Denies any reports chronic shortness of breath, denies any acute symptoms  SARS-CoV-2 PCR negative  · Monitor    Dyslipidemia  Assessment & Plan  On fish oil    BPH (benign prostatic hyperplasia)  Assessment & Plan  On tamsulosin    CKD (chronic kidney disease)  Assessment & Plan  Lab Results   Component Value Date    EGFR 39 01/02/2023    EGFR 34 01/01/2023    EGFR 41 12/31/2022    CREATININE 1 60 (H) 01/02/2023    CREATININE 1 81 (H) 01/01/2023    CREATININE 1 54 (H) 12/31/2022     Unknown baseline but creatinine was around 1 5-1 8 in 2018, presented with creatinine of 2 0  UA unremarkable except trace protein  Creatinine now remains at baseline  · Continue to monitor    CAD (coronary artery disease)  Assessment & Plan  History of CAD s/p LAD PCI with MCKENZIE in 2018  EKG sinus rhythm with PVCs  · Continue aspirin, metoprolol, fish oil  · Follow-up with primary cardiology after discharge          Medical Problems     Resolved Problems  Date Reviewed: 1/3/2023   None       Discharging Physician / Practitioner: Bianka Almendarez  PCP: Ashley Kolb  Admission Date:   Admission Orders (From admission, onward)     Ordered        12/29/22 1315  INPATIENT ADMISSION  Once Discharge Date: 01/03/23    Consultations During Hospital Stay:  · Podiatry    Procedures Performed:   Stab incision at the base of the proximal phalanx in the area of the posterior nail fold 3rd left toe    Significant Findings / Test Results:   Small amount of serosanguineous exudate  Incidental Findings:   · Moderate-sized Gruberi bursa   · I reviewed the above mentioned incidental findings with the patient and/or family and they expressed understanding  Test Results Pending at Discharge (will require follow up):   · X-ray of the left foot     Outpatient Tests Requested:  · None    Complications: None      Reason for Admission: Foot pain    Hospital Course:   Sharlene Powers is a 80 y o  male patient who originally presented to the hospital on 12/29/2022 due to left foot and ankle swelling  Mild leukocytosis on admission and x-ray of the left foot showed no acute osseous abnormality but evidence of fourth metatarsalphalangeal joint osteoarthritis  Blood cultures negative till date IV cefazolin was started and continued through hospitalization  Podiatry was consulted and performed a stab incision at the base of the proximal phalanx in the area of the posterior nail fold third left toe  Culture and sensitivity was sent and x-ray ordered to rule out osteomyelitis  Patient will follow up with podiatry outpatient for the results of x-ray and culture  Please see above list of diagnoses and related plan for additional information       Condition at Discharge: stable    Discharge Day Visit / Exam:     Subjective: Offered no complaints    Vitals: Blood Pressure: 121/72 (01/03/23 0739)  Pulse: (!) 53 (01/03/23 0739)  Temperature: 97 9 °F (36 6 °C) (01/03/23 0739)  Temp Source: Oral (01/03/23 0739)  Respirations: 18 (01/03/23 0739)  Height: 6' 2" (188 cm) (12/29/22 0840)  Weight - Scale: 85 3 kg (188 lb) (12/29/22 0840)  SpO2: 94 % (01/03/23 0739)    Physical Exam:   Physical Exam  Constitutional:       General: He is not in acute distress  HENT:      Head: Normocephalic and atraumatic  Cardiovascular:      Rate and Rhythm: Normal rate  Pulmonary:      Effort: Pulmonary effort is normal  No respiratory distress  Breath sounds: No wheezing, rhonchi or rales  Chest:      Chest wall: No tenderness  Abdominal:      General: Bowel sounds are normal  There is no distension  Palpations: Abdomen is soft  Tenderness: There is no abdominal tenderness  There is no guarding or rebound  Musculoskeletal:      Comments: Improving erythema, swelling and tenderness over the left foot and ankle with improvement in range of motion   Skin:     General: Skin is warm and dry  Findings: No rash  Neurological:      Mental Status: He is alert  Mental status is at baseline  Cranial Nerves: No cranial nerve deficit  Discussion with Family: Attempted to update  (wife) via phone  Unable to contact  Discharge instructions/Information to patient and family:   See after visit summary for information provided to patient and family  Provisions for Follow-Up Care:  See after visit summary for information related to follow-up care and any pertinent home health orders  Disposition:   Long Term Acute Care (LTAC) Facility at [facility], [facility]     Planned Readmission: No     Discharge Statement:  I spent 30 minutes discharging the patient  This time was spent on the day of discharge  I had direct contact with the patient on the day of discharge  Greater than 50% of the total time was spent examining patient, answering all patient questions, arranging and discussing plan of care with patient as well as directly providing post-discharge instructions  Additional time then spent on discharge activities  Discharge Medications:  See after visit summary for reconciled discharge medications provided to patient and/or family        **Please Note: This note may have been constructed using a voice recognition system**

## 2023-01-04 NOTE — NURSING NOTE
[facility] [address]  Report called in, spoke to Ami  All questions answered  IV removed per hospital protocol, occlusive dressing applied  Patient left unit via stretcher accompanied by transport team with all personal belongings

## 2023-01-06 LAB
BACTERIA WND AEROBE CULT: NO GROWTH
GRAM STN SPEC: NORMAL

## 2023-01-12 ENCOUNTER — LAB REQUISITION (OUTPATIENT)
Dept: LAB | Facility: HOSPITAL | Age: 83
End: 2023-01-12

## 2023-01-12 ENCOUNTER — OFFICE VISIT (OUTPATIENT)
Dept: PODIATRY | Facility: CLINIC | Age: 83
End: 2023-01-12

## 2023-01-12 VITALS — HEIGHT: 74 IN | RESPIRATION RATE: 18 BRPM | WEIGHT: 188 LBS | BODY MASS INDEX: 24.13 KG/M2

## 2023-01-12 DIAGNOSIS — L03.032 CELLULITIS OF LEFT TOE: ICD-10-CM

## 2023-01-12 DIAGNOSIS — L03.032 PARONYCHIA OF TOE, LEFT: Primary | ICD-10-CM

## 2023-01-12 DIAGNOSIS — B35.1 ONYCHOMYCOSIS: ICD-10-CM

## 2023-01-12 DIAGNOSIS — L85.3 XEROSIS CUTIS: ICD-10-CM

## 2023-01-12 RX ORDER — DOXYCYCLINE HYCLATE 100 MG/1
100 CAPSULE ORAL EVERY 12 HOURS SCHEDULED
Qty: 20 CAPSULE | Refills: 0 | Status: SHIPPED | OUTPATIENT
Start: 2023-01-12 | End: 2023-01-22

## 2023-01-12 NOTE — PROGRESS NOTES
Assessment/Plan:     appearance expressed from the left 3rd digit, culture collected, discussed with the patient his condition, patient opted for nail removal, left 3rd digit was scrubbed and prepped and draped in the usual aseptic manner, anesthesia was achieved using local infiltration of lidocaine, complete nail avulsion achieved to the left 3rd digit, site cleanse with copious amount of sterile saline dressed with topical antibiotic, discussed with the patient dressing changes, patient to start on oral doxycycline, patient return next week for re-evaluation     nails reduced to patient tolerance     Diagnoses and all orders for this visit:    Paronychia of toe, left  -     doxycycline hyclate (VIBRAMYCIN) 100 mg capsule; Take 1 capsule (100 mg total) by mouth every 12 (twelve) hours for 10 days  -     mupirocin (BACTROBAN) 2 % ointment; Apply topically daily    Onychomycosis    Xerosis cutis          Subjective:      Patient ID: Ronald Blackwell is a 80 y o  male  41-year-old male  Sent to the office from a nursing facility for pain swelling and redness to the left 3rd toe, patient was recently discharged from hospital admission after management of ankle swelling and redness, patient currently denies constitutional symptoms, patient denies recent trauma to the foot      The following portions of the patient's history were reviewed and updated as appropriate: allergies, current medications, past family history, past medical history, past social history, past surgical history and problem list     Review of Systems   Constitutional: Negative  Respiratory: Negative  Cardiovascular: Negative  Musculoskeletal: Positive for arthralgias, back pain, gait problem and joint swelling  Skin: Positive for color change  Historical Information   No past medical history on file  No past surgical history on file    Social History   Social History     Substance and Sexual Activity   Alcohol Use Not Currently     Social History     Substance and Sexual Activity   Drug Use Never     Social History     Tobacco Use   Smoking Status Unknown   Smokeless Tobacco Not on file     Family History: No family history on file  Meds/Allergies   all medications and allergies reviewed  No Known Allergies    Objective:      Resp 18   Ht 6' 2" (1 88 m)   Wt 85 3 kg (188 lb)   BMI 24 14 kg/m²          Physical Exam  Constitutional:       General: He is not in acute distress  Appearance: He is well-developed  He is not ill-appearing, toxic-appearing or diaphoretic  HENT:      Head: Normocephalic  Cardiovascular:      Pulses:           Dorsalis pedis pulses are 1+ on the right side and 1+ on the left side  Posterior tibial pulses are 0 on the right side and 0 on the left side  Comments: Palpable DP pulse, nonpalpable PT pulse, CFT is less than 3 seconds, temperature gradient within normal limit, a trophic skin changes noted with skin thinning in shiny, patient report occasional claudication to bilateral calf, localized edema foot and ankle Q9  Pulmonary:      Effort: Pulmonary effort is normal    Musculoskeletal:         General: Tenderness and deformity present  Comments: Pes planus type foot pain on palpation and range of motion of the 1st MPJ, metatarsal heads bilateral foot, pain on palpation on range of motion of the ST joint, crepitus noted in midfoot joint  Skin:     General: Skin is dry  Capillary Refill: Capillary refill takes 2 to 3 seconds  Comments: Trophic skin changes bilateral foot and ankle, alternating hypopigmentation and hyperpigmentation patches around the foot and ankle on anterior leg, skin is shiny and thin, absent hair growth, atrophy of fat pad  Multiple callus formation plantar foot painful on palpation to plantar heel bilateral, skin lines absent, hyperkeratotic rim and central atrophy noted      Thickened dystrophic discolored toenails of bilateral hallux, 5th digit bilateral, subungual debris and painful upon palpation, all other nails show signs of dystrophy with no subungual debris, all nails have malodor  paronychia noted to the left 3rd digit, localized around the distal tuft, drainage noted from the medial aspect of the left 3rd toenail,   Neurological:      Mental Status: He is alert and oriented to person, place, and time  Sensory: Sensory deficit present  Motor: Atrophy present  Deep Tendon Reflexes: Reflexes abnormal       Foot Exam    Right Foot/Ankle     Neurovascular  Dorsalis pedis: 1+  Posterior tibial: 0      Left Foot/Ankle      Neurovascular  Dorsalis pedis: 1+  Posterior tibial: 0              Portions of the record may have been created with voice recognition software  Occasional wrong word or "sound a like" substitutions may have occurred due to the inherent limitations of voice recognition software  Read the chart carefully and recognize, using context, where substitutions have occurred

## 2023-01-15 LAB
BACTERIA WND AEROBE CULT: ABNORMAL
GRAM STN SPEC: ABNORMAL

## 2023-01-20 ENCOUNTER — OFFICE VISIT (OUTPATIENT)
Dept: PODIATRY | Facility: CLINIC | Age: 83
End: 2023-01-20

## 2023-01-20 VITALS — HEIGHT: 74 IN | BODY MASS INDEX: 24.13 KG/M2 | WEIGHT: 188 LBS | RESPIRATION RATE: 18 BRPM

## 2023-01-20 DIAGNOSIS — L03.032 PARONYCHIA OF TOE, LEFT: Primary | ICD-10-CM

## 2023-02-16 NOTE — PROGRESS NOTES
Assessment/Plan:    Condition is resolving, patient to continue dressing changes until resolution, patient to return to the office as needed     Diagnoses and all orders for this visit:    Paronychia of toe, left          Subjective:      Patient ID: Kendall Tao is a 80 y o  male  Follow-up on a nail procedure performed last visit, patient report compliance with dressing changes and oral antibiotic, patient reports marked improvement  The following portions of the patient's history were reviewed and updated as appropriate: allergies, current medications, past family history, past medical history, past social history, past surgical history and problem list     Review of Systems   Constitutional: Negative  Respiratory: Negative  Cardiovascular: Negative  Musculoskeletal: Positive for arthralgias, back pain, gait problem and joint swelling  Skin: Positive for color change  Historical Information   No past medical history on file  No past surgical history on file  Social History   Social History     Substance and Sexual Activity   Alcohol Use Not Currently     Social History     Substance and Sexual Activity   Drug Use Never     Social History     Tobacco Use   Smoking Status Unknown   Smokeless Tobacco Not on file     Family History: No family history on file  Meds/Allergies   all medications and allergies reviewed  No Known Allergies    Objective:      Resp 18   Ht 6' 2" (1 88 m)   Wt 85 3 kg (188 lb)   BMI 24 14 kg/m²          Physical Exam  Constitutional:       General: He is not in acute distress  Appearance: He is well-developed  He is not ill-appearing, toxic-appearing or diaphoretic  HENT:      Head: Normocephalic  Cardiovascular:      Pulses:           Dorsalis pedis pulses are 1+ on the right side and 1+ on the left side  Posterior tibial pulses are 0 on the right side and 0 on the left side        Comments: Palpable DP pulse, nonpalpable PT pulse, CFT is less than 3 seconds, temperature gradient within normal limit, a trophic skin changes noted with skin thinning in shiny, patient report occasional claudication to bilateral calf, localized edema foot and ankle Q9  Pulmonary:      Effort: Pulmonary effort is normal    Musculoskeletal:         General: Tenderness and deformity present  Comments: Pes planus type foot pain on palpation and range of motion of the 1st MPJ, metatarsal heads bilateral foot, pain on palpation on range of motion of the ST joint, crepitus noted in midfoot joint  Skin:     General: Skin is dry  Capillary Refill: Capillary refill takes 2 to 3 seconds  Comments: Trophic skin changes bilateral foot and ankle, alternating hypopigmentation and hyperpigmentation patches around the foot and ankle on anterior leg, skin is shiny and thin, absent hair growth, atrophy of fat pad  Multiple callus formation plantar foot painful on palpation to plantar heel bilateral, skin lines absent, hyperkeratotic rim and central atrophy noted  Thickened dystrophic discolored toenails of bilateral hallux, 5th digit bilateral, subungual debris and painful upon palpation, all other nails show signs of dystrophy with no subungual debris, all nails have malodor  Marked improvement in nail condition, procedure site clean, coapted, no erythema no edema no ascending cellulitis   Neurological:      Mental Status: He is alert and oriented to person, place, and time  Sensory: Sensory deficit present  Motor: Atrophy present  Deep Tendon Reflexes: Reflexes abnormal       Foot Exam    Right Foot/Ankle     Neurovascular  Dorsalis pedis: 1+  Posterior tibial: 0      Left Foot/Ankle      Neurovascular  Dorsalis pedis: 1+  Posterior tibial: 0              Portions of the record may have been created with voice recognition software   Occasional wrong word or "sound a like" substitutions may have occurred due to the inherent limitations of voice recognition software  Read the chart carefully and recognize, using context, where substitutions have occurred

## 2023-02-22 ENCOUNTER — CONSULT (OUTPATIENT)
Dept: CARDIOLOGY CLINIC | Facility: CLINIC | Age: 83
End: 2023-02-22

## 2023-02-22 VITALS
HEART RATE: 60 BPM | HEIGHT: 74 IN | DIASTOLIC BLOOD PRESSURE: 72 MMHG | BODY MASS INDEX: 24.14 KG/M2 | OXYGEN SATURATION: 96 % | SYSTOLIC BLOOD PRESSURE: 110 MMHG

## 2023-02-22 DIAGNOSIS — I95.1 ORTHOSTATIC HYPOTENSION: Primary | ICD-10-CM

## 2023-02-22 DIAGNOSIS — I25.10 CAD S/P PERCUTANEOUS CORONARY ANGIOPLASTY: ICD-10-CM

## 2023-02-22 DIAGNOSIS — I10 ESSENTIAL HYPERTENSION: ICD-10-CM

## 2023-02-22 DIAGNOSIS — F41.9 ANXIETY AND DEPRESSION: ICD-10-CM

## 2023-02-22 DIAGNOSIS — N18.32 STAGE 3B CHRONIC KIDNEY DISEASE (HCC): ICD-10-CM

## 2023-02-22 DIAGNOSIS — Z79.4 TYPE 2 DIABETES MELLITUS WITH HYPEROSMOLARITY WITHOUT COMA, WITH LONG-TERM CURRENT USE OF INSULIN (HCC): ICD-10-CM

## 2023-02-22 DIAGNOSIS — Z98.61 CAD S/P PERCUTANEOUS CORONARY ANGIOPLASTY: ICD-10-CM

## 2023-02-22 DIAGNOSIS — F32.A ANXIETY AND DEPRESSION: ICD-10-CM

## 2023-02-22 DIAGNOSIS — E78.5 DYSLIPIDEMIA: ICD-10-CM

## 2023-02-22 DIAGNOSIS — E55.9 VITAMIN D INSUFFICIENCY: ICD-10-CM

## 2023-02-22 DIAGNOSIS — F41.0 EPISODIC PAROXYSMAL ANXIETY DISORDER: ICD-10-CM

## 2023-02-22 DIAGNOSIS — R52 MILD PAIN: ICD-10-CM

## 2023-02-22 DIAGNOSIS — N40.0 BENIGN PROSTATIC HYPERPLASIA WITHOUT LOWER URINARY TRACT SYMPTOMS: ICD-10-CM

## 2023-02-22 DIAGNOSIS — R42 POSTURAL DIZZINESS WITH NEAR SYNCOPE: ICD-10-CM

## 2023-02-22 DIAGNOSIS — J84.112 CHRONIC IDIOPATHIC PULMONARY FIBROSIS (HCC): ICD-10-CM

## 2023-02-22 DIAGNOSIS — R55 POSTURAL DIZZINESS WITH NEAR SYNCOPE: ICD-10-CM

## 2023-02-22 DIAGNOSIS — E11.00 TYPE 2 DIABETES MELLITUS WITH HYPEROSMOLARITY WITHOUT COMA, WITH LONG-TERM CURRENT USE OF INSULIN (HCC): ICD-10-CM

## 2023-02-22 NOTE — LETTER
February 22, 2023     Gisell Tobar  [address] [place] [address]    Patient: William Bartlett   YOB: 1940   Date of Visit: 2/22/2023     Dear Dr Brandy Cunningham      Thank you for referring William Bartlett to me for evaluation  Below are the relevant portions of my assessment and plan of care  If you have questions, please do not hesitate to call me  I look forward to following Barbara Patino along with you  Sincerely,        Claude Leaf, MD        CC: MD Dorian Chavira MD    Cardiology Office Consultation   William Bartlett 80 y o  male MRN: [id number]  02/22/23  11:22 PM           Assessment/Plan      1  Symptomatic orthostatic hypotension with several resulting falls  2   Clinically stable CAD with remote PCI of mid LAD on 11/2/2018  3   Controlled essential hypertension  4   Presumed dyslipidemia, not treated with any statin at this time  5   Chronic kidney disease, stage III a-b  6   Chronic neurologic gait dysfunction, with patient spending most of his time in wheelchair at present  7   Insulin-dependent type 2 diabetes mellitus  8   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  9   Asymptomatic PVCs and PACs, suppressed  10   History of BPH  11   Vitamin D insufficiency            Plan and   Patient Instructions      1  Obtain detailed medication list from [address] [place] [address] and consider increasing dose of midodrine or use of Florinef or reduction in dose of amlodipine and/or lisinopril  2  Continue current medication  3  Schedule patient for Lexiscan nuclear stress study in 2-3 months    4  Continue all other medication as presently using                Current Outpatient Medications   Medication Sig Dispense Refill   • amLODIPine (NORVASC) 10 mg tablet Take 10 mg by mouth daily One per day        • aspirin 81 mg chewable tablet Chew 81 mg daily       • cholecalciferol (VITAMIN D3) 1,000 units tablet Take 1,000 Units by mouth daily       • clobetasol (TEMOVATE) 0 05 % GEL Apply topically in the morning       • insulin glargine (TOUJEO) 300 units/mL CONCENTRATED U-300 injection pen (1-unit dial) Inject 36 Units under the skin daily       • linaGLIPtin 5 MG TABS Take 5 mg by mouth daily       • lisinopril (ZESTRIL) 5 mg tablet Take 5 mg by mouth daily       • magnesium gluconate (MAGONATE) 500 mg tablet Take 500 mg by mouth in the morning       • metoprolol succinate (TOPROL-XL) 50 mg 24 hr tablet Take 50 mg by mouth daily Two per day       • mupirocin (BACTROBAN) 2 % ointment Apply topically daily 22 g 0   • omega-3-acid ethyl esters (LOVAZA) 1 g capsule Take 1 g by mouth 2 (two) times a day       • tamsulosin (FLOMAX) 0 4 mg Take 0 4 mg by mouth daily with dinner       • vitamin B-12 (VITAMIN B-12) 1,000 mcg tablet Take 1,000 mcg by mouth daily          No current facility-administered medications for this visit             History of Present Illness      Physician Requesting Consult: Diana Conklin MD        Reason for Consult / Principal Problem: Recent symptomatic orthostatic hypotension in patient with known CAD        HPI:      Liane Coon is a 80y o  year old male who presents for cardiology consultation at request of Dr Diana Conklin MD, who is the attending physician at [address] [place] [address] at request of Dr Ean Mendoza who is the attending years, to which he was transferred from [facility] after discharge on 1/3/2023      He had been admitted to [address] [place] [address] with cellulitis of the left foot and ankle, which responded to intravenous antibiotics and incision and drainage      The patient's background includes CAD with PCI of mid LAD at [facility] in November, 2018, essential hypertension, chronic neurologic gait dysfunction, type 2 diabetes mellitus, requiring insulin, interstitial pulmonary fibrosis with possible bronchiectasis, asymptomatic PVCs and PACs, chronic kidney disease, stage IIIa, dyslipidemia, BPH, vitamin D insufficiency      The patient denies any recent chest pain, dyspnea, palpitations, syncope, dizziness, lightheadedness, edema, cough, sputum production, wheezing, fever, chills  However, the patient is stated to exhibit postural hypotension with blood pressure as low as 87/60 and associated vertigo and heaviness in his head in the upright position  He had fallen 3 times since transfer to the nursing facility attributed to orthostatic hypotension with dizziness      The patient quit smoking about 50 years ago and denies any recent alcohol consumption or ever having used illicit drugs                  Historical Information        Past Medical History:   Diagnosis Date   • Coronary artery disease 2018     Severe MLAD Stenosis         Past Surgical History:        Past Surgical History:   Procedure Laterality Date   • CORONARY STENT PLACEMENT   2018     PCI with MCKENZIE to LAD      Social History          Substance and Sexual Activity   Alcohol Use Not Currently      Social History          Substance and Sexual Activity   Drug Use Never      Social History          Tobacco Use   Smoking Status Unknown   Smokeless Tobacco Not on file      History reviewed  No pertinent family history      Meds/Allergies           Prior to Admission medications    Medication Sig Start Date End Date Taking?  Authorizing Provider   amLODIPine (NORVASC) 10 mg tablet Take 10 mg by mouth daily One per day      Yes Historical Provider, MD   aspirin 81 mg chewable tablet Chew 81 mg daily     Yes Historical Provider, MD   cholecalciferol (VITAMIN D3) 1,000 units tablet Take 1,000 Units by mouth daily     Yes Historical Provider, MD   clobetasol (TEMOVATE) 0 05 % GEL Apply topically in the morning     Yes Historical Provider, MD   insulin glargine (TOUJEO) 300 units/mL CONCENTRATED U-300 injection pen (1-unit dial) Inject 36 Units under the skin daily     Yes Historical Provider, MD   linaGLIPtin 5 MG TABS Take 5 mg by mouth daily     Yes Historical Provider, MD   lisinopril (ZESTRIL) 5 mg tablet Take 5 mg by mouth daily     Yes Historical Provider, MD   magnesium gluconate (MAGONATE) 500 mg tablet Take 500 mg by mouth in the morning     Yes Historical Provider, MD   metoprolol succinate (TOPROL-XL) 50 mg 24 hr tablet Take 50 mg by mouth daily Two per day     Yes Historical Provider, MD   mupirocin (BACTROBAN) 2 % ointment Apply topically daily 1/12/23   Yes Deborah Hagan DPM   unqpw-3-venv ethyl esters (LOVAZA) 1 g capsule Take 1 g by mouth 2 (two) times a day     Yes Historical Provider, MD   tamsulosin (FLOMAX) 0 4 mg Take 0 4 mg by mouth daily with dinner     Yes Historical Provider, MD   vitamin B-12 (VITAMIN B-12) 1,000 mcg tablet Take 1,000 mcg by mouth daily     Yes Historical Provider, MD      No Known Allergies     Cardiovascular ROS:   More than 10 systems reviewed and all systems negative, except as noted in history of present illness    Objective      VITALS:   Blood pressure 110/72, pulse 60, height 6' 2" (1 88 m), SpO2 96 %  No weight measured owing to wheelchair status  Body mass index is 24 14 kg/m²      Physical Exam        General-Well-developed well-nourished slender  male in no acute distress  Patient is alert, oriented X3 and cooperative  Skin-Warm and dry with no pallor, rashes, ecchymoses, lesions  HEENT-Normocephalic  Pupils equally round and reactive to light and accommodation  Extraocular muscles intact  Mucous membranes pink and moist  Sclerae nonicteric  Optic fundi poorly seen  Neck-No jugular venous distention, AJR, masses, thyromegaly, adenopathy, bruits  Lungs-No  rhonchi, wheezes  There are diminished breath sounds at the right posterior base with bibasilar crackles  Heart-No murmur, gallop, rub, click, heave, thrill  Abdomen-Normal bowel sounds with no masses, organomegaly, guarding, tenderness, or rigidity    Extremities-No cyanosis, clubbing, edema, with difficult to palpate pedal pulses, but both feet are equally warm    Neurological-No focal neurological signs                                             EKG 02/22/23:      Normal sinus rhythm at 60 bpm  Abnormal R wave progression, probably related to faulty lead placement  Otherwise normal ECG  Suppressed PVCs and PAC with slower heart rate by 32 bpm compared to 12/29/2022        IMAGING:     Portable chest x-ray, 1 view 12/29/2022:      Moderate cardiomegaly  Moderate diffuse bilateral pulmonary parenchymal opacity, consistent with pulmonary fibrosis      Cardiac catheterization with PCI 11/2/2018:     Severe mid LAD stenosis  Successful PCI with drug-eluting stent to LAD         LAB REVIEW:     Lab Results   Component Value Date     SODIUM 138 01/03/2023     K 4 1 01/03/2023      01/03/2023     CO2 26 01/03/2023     BUN 33 (H) 01/03/2023     CREATININE 1 51 (H) 01/03/2023     CALCIUM 8 7 01/03/2023     CORRECTEDCA 10 0 12/29/2022     AST 16 12/29/2022     ALT 17 12/29/2022     ALKPHOS 135 (H) 12/29/2022     EGFR 42 01/03/2023      No results found for: CHOLESTEROL  No results found for: HDL  No results found for: LDLCHOLEST  No results found for: LDLCALC  No components found for: DIRECTLDLREFLEX  No results found for: [id number]  No results found for: TRIG      CMP 2/17/2023: Glucose 119, BUN/creatinine 27/1 49 with estimated GFR 47, chloride 112, albumin 2 9 and otherwise normal CMP  Vitamin B12 2/17/2023: Normal  Vitamin D, 25-OH 2/17/2023: 26, decreased  CBC 2/17/2023: H/H 11 9/36 1 with normal WBC and platelets  A8J and estimated average glucose 12/30/2022: 7 0% and 154              Total office time spent today 56  minutes             Jacob Swartz MD

## 2023-02-22 NOTE — LETTER
February 22, 2023     Phan Dukeirineo Castrodon [address] [facility]    Patient: Jodi Lopez   YOB: 1940   Date of Visit: 2/22/2023       Dear Dr Misti Bardales: Thank you for referring Jodi Lopez to me for evaluation  Below are my notes for this consultation  If you have questions, please do not hesitate to call me  I look forward to following your patient along with you  Sincerely,        Dina Damon MD        CC: MD Feliz Cee MD Viviane Ada, MD  2/22/2023 11:06 PM  Incomplete  Cardiology Office Consultation   Jodi Lopez 80 y o  male MRN: [id number]  02/22/23  10:07 PM        Assessment/Plan     ***        Plan and There are no Patient Instructions on file for this visit  Current Outpatient Medications   Medication Sig Dispense Refill   • amLODIPine (NORVASC) 10 mg tablet Take 10 mg by mouth daily One per day  • aspirin 81 mg chewable tablet Chew 81 mg daily     • cholecalciferol (VITAMIN D3) 1,000 units tablet Take 1,000 Units by mouth daily     • clobetasol (TEMOVATE) 0 05 % GEL Apply topically in the morning     • insulin glargine (TOUJEO) 300 units/mL CONCENTRATED U-300 injection pen (1-unit dial) Inject 36 Units under the skin daily     • linaGLIPtin 5 MG TABS Take 5 mg by mouth daily     • lisinopril (ZESTRIL) 5 mg tablet Take 5 mg by mouth daily     • magnesium gluconate (MAGONATE) 500 mg tablet Take 500 mg by mouth in the morning     • metoprolol succinate (TOPROL-XL) 50 mg 24 hr tablet Take 50 mg by mouth daily Two per day     • mupirocin (BACTROBAN) 2 % ointment Apply topically daily 22 g 0   • omega-3-acid ethyl esters (LOVAZA) 1 g capsule Take 1 g by mouth 2 (two) times a day     • tamsulosin (FLOMAX) 0 4 mg Take 0 4 mg by mouth daily with dinner     • vitamin B-12 (VITAMIN B-12) 1,000 mcg tablet Take 1,000 mcg by mouth daily       No current facility-administered medications for this visit           History of Present Illness Physician Requesting Consult: Lendel Carrel, DO      Reason for Consult / Principal Problem: ***       HPI:   Jessica Meyers is a 80y o  year old male who presents for ***        Historical Information   Past Medical History:   Diagnosis Date   • Coronary artery disease 2018    Severe MLAD Stenosis       Past Surgical History:  Past Surgical History:   Procedure Laterality Date   • CORONARY STENT PLACEMENT  2018    PCI with MCKENZIE to LAD     Social History     Substance and Sexual Activity   Alcohol Use Not Currently     Social History     Substance and Sexual Activity   Drug Use Never     Social History     Tobacco Use   Smoking Status Unknown   Smokeless Tobacco Not on file     History reviewed  No pertinent family history  Meds/Allergies    Prior to Admission medications    Medication Sig Start Date End Date Taking? Authorizing Provider   amLODIPine (NORVASC) 10 mg tablet Take 10 mg by mouth daily One per day     Yes Historical Provider, MD   aspirin 81 mg chewable tablet Chew 81 mg daily   Yes Historical Provider, MD   cholecalciferol (VITAMIN D3) 1,000 units tablet Take 1,000 Units by mouth daily   Yes Historical Provider, MD   clobetasol (TEMOVATE) 0 05 % GEL Apply topically in the morning   Yes Historical Provider, MD   insulin glargine (TOUJEO) 300 units/mL CONCENTRATED U-300 injection pen (1-unit dial) Inject 36 Units under the skin daily   Yes Historical Provider, MD   linaGLIPtin 5 MG TABS Take 5 mg by mouth daily   Yes Historical Provider, MD   lisinopril (ZESTRIL) 5 mg tablet Take 5 mg by mouth daily   Yes Historical Provider, MD   magnesium gluconate (MAGONATE) 500 mg tablet Take 500 mg by mouth in the morning   Yes Historical Provider, MD   metoprolol succinate (TOPROL-XL) 50 mg 24 hr tablet Take 50 mg by mouth daily Two per day   Yes Historical Provider, MD   mupirocin (BACTROBAN) 2 % ointment Apply topically daily 1/12/23  Yes Anuja Hay DPM   [id number] ethyl esters (LOVAZA) 1 g capsule Take 1 g by mouth 2 (two) times a day   Yes Historical Provider, MD   tamsulosin (FLOMAX) 0 4 mg Take 0 4 mg by mouth daily with dinner   Yes Historical Provider, MD   vitamin B-12 (VITAMIN B-12) 1,000 mcg tablet Take 1,000 mcg by mouth daily   Yes Historical Provider, MD     No Known Allergies    Cardiovascular ROS:   More than 10 systems reviewed and all systems negative, except as noted in history of present illness    Objective      VITALS:   Blood pressure 110/72, pulse 60, height 6' 2" (1 88 m), SpO2 96 %  No weight measured owing to wheelchair status  Body mass index is 24 14 kg/m²  Physical Exam      General-Well-developed well-nourished slender  male in no acute distress  Patient is alert, oriented X3 and cooperative  Skin-Warm and dry with no pallor, rashes, ecchymoses, lesions  HEENT-Normocephalic  Pupils equally round and reactive to light and accommodation  Extraocular muscles intact  Mucous membranes pink and moist  Sclerae nonicteric  Optic fundi poorly seen  Neck-No jugular venous distention, AJR, masses, thyromegaly, adenopathy, bruits  Lungs-No  rhonchi, wheezes  There are diminished breath sounds at the right posterior base with bibasilar crackles  Heart-No murmur, gallop, rub, click, heave, thrill  Abdomen-Normal bowel sounds with no masses, organomegaly, guarding, tenderness, or rigidity  Extremities-No cyanosis, clubbing, edema, with difficult to palpate pedal pulses, but both feet are equally warm  Neurological-No focal neurological signs  EKG 02/22/23:     Normal sinus rhythm at 60 bpm  Abnormal R wave progression, probably related to faulty lead placement  Otherwise normal ECG  Suppressed PVCs and PAC with slower heart rate by 32 bpm compared to 12/29/2022      IMAGING:    Portable chest x-ray, 1 view 12/29/2022:      Moderate cardiomegaly  Moderate diffuse bilateral pulmonary parenchymal opacity, consistent with pulmonary fibrosis  Cardiac catheterization with PCI 11/2/2018:    Severe mid LAD stenosis  Successful PCI with drug-eluting stent to LAD  LAB REVIEW:    Lab Results   Component Value Date    SODIUM 138 01/03/2023    K 4 1 01/03/2023     01/03/2023    CO2 26 01/03/2023    BUN 33 (H) 01/03/2023    CREATININE 1 51 (H) 01/03/2023    CALCIUM 8 7 01/03/2023    CORRECTEDCA 10 0 12/29/2022    AST 16 12/29/2022    ALT 17 12/29/2022    ALKPHOS 135 (H) 12/29/2022    EGFR 42 01/03/2023     No results found for: CHOLESTEROL  No results found for: HDL  No results found for: LDLCHOLEST  No results found for: LDLCALC  No components found for: DIRECTLDLREFLEX  No results found for: [id number]  No results found for: TRIG     CMP 2/17/2023: Glucose 119, BUN/creatinine 27/1 49 with estimated GFR 47, chloride 112, albumin 2 9 and otherwise normal CMP  Vitamin B12 2/17/2023: Normal  Vitamin D, 25-OH 2/17/2023: 26, decreased  CBC 2/17/2023: H/H 11 9/36 1 with normal WBC and platelets  K8W and estimated average glucose 12/30/2022: 7 0% and 154          Total office time spent today 56  minutes           Jamarcus Dhillon MD

## 2023-02-23 RX ORDER — ESCITALOPRAM OXALATE 10 MG/1
10 TABLET ORAL
Qty: 30 TABLET | Refills: 5
Start: 2023-02-23

## 2023-02-23 RX ORDER — BUSPIRONE HYDROCHLORIDE 5 MG/1
5 TABLET ORAL 2 TIMES DAILY
Qty: 60 TABLET | Refills: 5
Start: 2023-02-23

## 2023-02-23 RX ORDER — MIDODRINE HYDROCHLORIDE 5 MG/1
5 TABLET ORAL
Qty: 90 TABLET | Refills: 5
Start: 2023-02-23

## 2023-02-23 RX ORDER — METOPROLOL SUCCINATE 25 MG/1
25 TABLET, EXTENDED RELEASE ORAL DAILY
Qty: 30 TABLET | Refills: 5
Start: 2023-02-23

## 2023-02-23 RX ORDER — ACETAMINOPHEN 325 MG/1
650 TABLET ORAL EVERY 6 HOURS PRN
Qty: 60 TABLET | Refills: 5
Start: 2023-02-23

## 2023-02-23 NOTE — PROGRESS NOTES
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  9   Asymptomatic PVCs and PACs, suppressed  10   History of BPH  11   Vitamin D insufficiency  Plan and   Patient Instructions     1  Increase midodrine to 10 mg 3 times a day before meals with last dose no later than 5 PM   2  Continue other medications as currently being given  3  Schedule patient for Lexiscan nuclear stress study in 2-3 months  4  Continue all other medication as presently using  5  We will happily reevaluate this patient, depending upon his response to the above change  Current Outpatient Medications   Medication Sig Dispense Refill   • amLODIPine (NORVASC) 10 mg tablet Take 10 mg by mouth daily One per day       • aspirin 81 mg chewable tablet Chew 81 mg daily     • cholecalciferol (VITAMIN D3) 1,000 units tablet Take 1,000 Units by mouth daily     • clobetasol (TEMOVATE) 0 05 % GEL Apply topically in the morning     • insulin glargine (TOUJEO) 300 units/mL CONCENTRATED U-300 injection pen (1-unit dial) Inject 36 Units under the skin daily     • linaGLIPtin 5 MG TABS Take 5 mg by mouth daily     • lisinopril (ZESTRIL) 5 mg tablet Take 5 mg by mouth daily     • magnesium gluconate (MAGONATE) 500 mg tablet Take 500 mg by mouth in the morning     • metoprolol succinate (TOPROL-XL) 50 mg 24 hr tablet Take 50 mg by mouth daily Two per day     • mupirocin (BACTROBAN) 2 % ointment Apply topically daily 22 g 0   • omega-3-acid ethyl esters (LOVAZA) 1 g capsule Take 1 g by mouth 2 (two) times a day     • tamsulosin (FLOMAX) 0 4 mg Take 0 4 mg by mouth daily with dinner     • vitamin B-12 (VITAMIN B-12) 1,000 mcg tablet Take 1,000 mcg by mouth daily       No current facility-administered medications for this visit  History of Present Illness     Physician Requesting Consult: Eunice Kirkland MD      Reason for Consult / Principal Problem: Recent symptomatic orthostatic hypotension in patient with known CAD      HPI:     Winter Rae is a 80y o  year old male who presents for cardiology consultation at request of Dr Eunice Kirkland MD, who is the attending physician at [address] [place] [address] at request of Dr Caio Marrero who is the attending years, to which he was transferred from [facility] after discharge on 1/3/2023  He had been admitted to [address] [place] [address] with cellulitis of the left foot and ankle, which responded to intravenous antibiotics and incision and drainage  The patient's background includes CAD with PCI of mid LAD at [facility] in November, 2018, essential hypertension, chronic neurologic gait dysfunction, type 2 diabetes mellitus, requiring insulin, interstitial pulmonary fibrosis with possible bronchiectasis, asymptomatic PVCs and PACs, chronic kidney disease, stage IIIa, dyslipidemia, BPH, vitamin D insufficiency  The patient denies any recent chest pain, dyspnea, palpitations, syncope, dizziness, lightheadedness, edema, cough, sputum production, wheezing, fever, chills  However, the patient is stated to exhibit postural hypotension with blood pressure as low as 87/60 and associated vertigo and heaviness in his head in the upright position  He had fallen 3 times since transfer to the nursing facility attributed to orthostatic hypotension with dizziness      The patient quit smoking about 50 years ago and denies any recent alcohol consumption or ever having used illicit drugs  Historical Information   Past Medical History:   Diagnosis Date   • Coronary artery disease 2018    Severe MLAD Stenosis       Past Surgical History:  Past Surgical History:   Procedure Laterality Date   • CORONARY STENT PLACEMENT  2018    PCI with MCKENZIE to LAD     Social History     Substance and Sexual Activity   Alcohol Use Not Currently     Social History     Substance and Sexual Activity   Drug Use Never     Social History     Tobacco Use   Smoking Status Unknown   Smokeless Tobacco Not on file     History reviewed  No pertinent family history  Meds/Allergies   Prior to Admission medications    Medication Sig Start Date End Date Taking? Authorizing Provider   amLODIPine (NORVASC) 10 mg tablet Take 10 mg by mouth daily One per day     Yes Historical Provider, MD   aspirin 81 mg chewable tablet Chew 81 mg daily   Yes Historical Provider, MD   cholecalciferol (VITAMIN D3) 1,000 units tablet Take 1,000 Units by mouth daily   Yes Historical Provider, MD   clobetasol (TEMOVATE) 0 05 % GEL Apply topically in the morning   Yes Historical Provider, MD   insulin glargine (TOUJEO) 300 units/mL CONCENTRATED U-300 injection pen (1-unit dial) Inject 36 Units under the skin daily   Yes Historical Provider, MD   linaGLIPtin 5 MG TABS Take 5 mg by mouth daily   Yes Historical Provider, MD   lisinopril (ZESTRIL) 5 mg tablet Take 5 mg by mouth daily   Yes Historical Provider, MD   magnesium gluconate (MAGONATE) 500 mg tablet Take 500 mg by mouth in the morning   Yes Historical Provider, MD   metoprolol succinate (TOPROL-XL) 50 mg 24 hr tablet Take 50 mg by mouth daily Two per day   Yes Historical Provider, MD   mupirocin (BACTROBAN) 2 % ointment Apply topically daily 1/12/23  Yes Torrey Coello DPM   omega-3-acid ethyl esters (LOVAZA) 1 g capsule Take 1 g by mouth 2 (two) times a day   Yes Historical Provider, MD   tamsulosin (FLOMAX) 0 4 mg Take 0 4 mg by mouth daily with dinner   Yes Historical Provider, MD   vitamin B-12 (VITAMIN B-12) 1,000 mcg tablet Take 1,000 mcg by mouth daily   Yes Historical Provider, MD     No Known Allergies    Cardiovascular ROS:   More than 10 systems reviewed and all systems negative, except as noted in history of present illness    Objective     VITALS:   Blood pressure 110/72, pulse 60, height 6' 2" (1 88 m), SpO2 96 %  No weight measured owing to wheelchair status  Body mass index is 24 14 kg/m²  Physical Exam      General-Well-developed well-nourished slender  male in no acute distress  Patient is alert, oriented X3 and cooperative  Skin-Warm and dry with no pallor, rashes, ecchymoses, lesions  HEENT-Normocephalic  Pupils equally round and reactive to light and accommodation  Extraocular muscles intact  Mucous membranes pink and moist  Sclerae nonicteric  Optic fundi poorly seen  Neck-No jugular venous distention, AJR, masses, thyromegaly, adenopathy, bruits  Lungs-No  rhonchi, wheezes  There are diminished breath sounds at the right posterior base with bibasilar crackles  Heart-No murmur, gallop, rub, click, heave, thrill  Abdomen-Normal bowel sounds with no masses, organomegaly, guarding, tenderness, or rigidity  Extremities-No cyanosis, clubbing, edema, with difficult to palpate pedal pulses, but both feet are equally warm  Neurological-No focal neurological signs  EKG 02/22/23:     Normal sinus rhythm at 60 bpm  Abnormal R wave progression, probably related to faulty lead placement  Otherwise normal ECG  Suppressed PVCs and PAC with slower heart rate by 32 bpm compared to 12/29/2022      IMAGING:    Portable chest x-ray, 1 view 12/29/2022: Moderate cardiomegaly  Moderate diffuse bilateral pulmonary parenchymal opacity, consistent with pulmonary fibrosis      Cardiac catheterization with PCI 11/2/2018:    Severe mid LAD stenosis  Successful PCI with drug-eluting stent to LAD  LAB REVIEW:    Lab Results   Component Value Date    SODIUM 138 01/03/2023    K 4 1 01/03/2023     01/03/2023    CO2 26 01/03/2023    BUN 33 (H) 01/03/2023    CREATININE 1 51 (H) 01/03/2023    CALCIUM 8 7 01/03/2023    CORRECTEDCA 10 0 12/29/2022    AST 16 12/29/2022    ALT 17 12/29/2022    ALKPHOS 135 (H) 12/29/2022    EGFR 42 01/03/2023     No results found for: CHOLESTEROL  No results found for: HDL  No results found for: LDLCHOLEST  No results found for: LDLCALC  No components found for: DIRECTLDLREFLEX  No results found for: [id number]  No results found for: TRIG     CMP 2/17/2023: Glucose 119, BUN/creatinine 27/1 49 with estimated GFR 47, chloride 112, albumin 2 9 and otherwise normal CMP  Vitamin B12 2/17/2023: Normal  Vitamin D, 25-OH 2/17/2023: 26, decreased  CBC 2/17/2023: H/H 11 9/36 1 with normal WBC and platelets  I7O and estimated average glucose 12/30/2022: 7 0% and 154          Total office time spent today 56  minutes           Dina Damon MD

## 2023-02-23 NOTE — PATIENT INSTRUCTIONS
Obtain detailed medication list from [facility] and consider increasing dose of midodrine or use of Florinef or reduction in dose of amlodipine and/or lisinopril  Continue current medication  Schedule patient for Lexiscan nuclear stress study in 2-3 months  Continue all other medication as presently using

## 2023-05-08 ENCOUNTER — HOSPITAL ENCOUNTER (OUTPATIENT)
Dept: RADIOLOGY | Facility: HOSPITAL | Age: 83
Discharge: HOME | End: 2023-05-08
Attending: INTERNAL MEDICINE

## 2023-05-08 ENCOUNTER — HOSPITAL ENCOUNTER (OUTPATIENT)
Dept: NON INVASIVE DIAGNOSTICS | Facility: HOSPITAL | Age: 83
Discharge: HOME | End: 2023-05-08
Attending: INTERNAL MEDICINE

## 2023-05-08 VITALS
DIASTOLIC BLOOD PRESSURE: 104 MMHG | HEIGHT: 74 IN | BODY MASS INDEX: 24.13 KG/M2 | WEIGHT: 188 LBS | OXYGEN SATURATION: 95 % | HEART RATE: 115 BPM | SYSTOLIC BLOOD PRESSURE: 146 MMHG

## 2023-05-08 DIAGNOSIS — Z79.4 TYPE 2 DIABETES MELLITUS WITH HYPEROSMOLARITY WITHOUT COMA, WITH LONG-TERM CURRENT USE OF INSULIN (HCC): ICD-10-CM

## 2023-05-08 DIAGNOSIS — E11.00 TYPE 2 DIABETES MELLITUS WITH HYPEROSMOLARITY WITHOUT COMA, WITH LONG-TERM CURRENT USE OF INSULIN (HCC): ICD-10-CM

## 2023-05-08 DIAGNOSIS — I48.0 PAROXYSMAL ATRIAL FIBRILLATION (HCC): Primary | ICD-10-CM

## 2023-05-08 DIAGNOSIS — I25.10 CAD S/P PERCUTANEOUS CORONARY ANGIOPLASTY: ICD-10-CM

## 2023-05-08 DIAGNOSIS — Z98.61 CAD S/P PERCUTANEOUS CORONARY ANGIOPLASTY: ICD-10-CM

## 2023-05-08 DIAGNOSIS — E78.5 DYSLIPIDEMIA: ICD-10-CM

## 2023-05-08 DIAGNOSIS — I10 ESSENTIAL HYPERTENSION: ICD-10-CM

## 2023-05-08 LAB
CHEST PAIN STATEMENT: NORMAL
MAX DIASTOLIC BP: 104 MMHG
MAX HEART RATE: 134 BPM
MAX PREDICTED HEART RATE: 138 BPM
MAX. SYSTOLIC BP: 146 MMHG
PROTOCOL NAME: NORMAL
REASON FOR TERMINATION: NORMAL
TARGET HR FORMULA: NORMAL
TEST INDICATION: NORMAL
TIME IN EXERCISE PHASE: NORMAL

## 2023-05-08 RX ORDER — REGADENOSON 0.08 MG/ML
0.4 INJECTION, SOLUTION INTRAVENOUS ONCE
Status: COMPLETED | OUTPATIENT
Start: 2023-05-08 | End: 2023-05-08

## 2023-05-08 RX ADMIN — REGADENOSON 0.4 MG: 0.08 INJECTION, SOLUTION INTRAVENOUS at 10:10

## 2023-05-08 NOTE — PROGRESS NOTES
Message sent to clinical staff 5/8/2023:    Schedule this patient for an appointment in the next several weeks, even if we have to move a routine follow-up patient, as he was found to have new onset atrial fibrillation on his EKGs done at the time of his nuclear stress test on 5/8/2023  Also, give the patient a 4-week supply of samples of Eliquis 2 5 mg twice a day to tide him over until he comes into the office  Let him know that this is for stroke prevention, which is a common complication of the heart rhythm irregularity he has  I will go into much more detail about this and the results of his testing at the time of his appointment in the next few weeks      Dr Patrick Summers

## 2023-05-09 ENCOUNTER — TELEPHONE (OUTPATIENT)
Dept: CARDIOLOGY CLINIC | Facility: CLINIC | Age: 83
End: 2023-05-09

## 2023-05-09 LAB
NUC STRESS EJECTION FRACTION: 62 %
RATE PRESSURE PRODUCT: NORMAL
SL CV REST NUCLEAR ISOTOPE DOSE: 10.3 MCI
SL CV STRESS NUCLEAR ISOTOPE DOSE: 30.8 MCI
SL CV STRESS RECOVERY BP: NORMAL MMHG
SL CV STRESS RECOVERY HR: 117 BPM
SL CV STRESS RECOVERY O2 SAT: 96 %
STRESS ANGINA INDEX: 0
STRESS BASELINE BP: NORMAL MMHG
STRESS BASELINE HR: 115 BPM
STRESS O2 SAT REST: 98 %
STRESS PEAK HR: 127 BPM
STRESS POST O2 SAT PEAK: 96 %
STRESS POST PEAK BP: 90 MMHG
STRESS/REST PERFUSION RATIO: 1.5

## 2023-05-09 NOTE — TELEPHONE ENCOUNTER
Left message for patient appt made on May 23 with Dr Carlos Abebe also the samples that Dr wanted him to have are in the cabinet   Message left was for him to call back and to  the Eliquis samples asap per Dr Carlos Abebe

## 2023-05-26 ENCOUNTER — HOSPITAL ENCOUNTER (OUTPATIENT)
Dept: RADIOLOGY | Facility: HOSPITAL | Age: 83
Discharge: HOME | End: 2023-05-26
Attending: FAMILY MEDICINE

## 2023-05-26 DIAGNOSIS — R09.02 HYPOXEMIA: ICD-10-CM

## 2023-06-19 ENCOUNTER — APPOINTMENT (EMERGENCY)
Dept: RADIOLOGY | Facility: HOSPITAL | Age: 83
DRG: 196 | End: 2023-06-19
Payer: MEDICARE

## 2023-06-19 ENCOUNTER — HOSPITAL ENCOUNTER (INPATIENT)
Facility: HOSPITAL | Age: 83
LOS: 5 days | Discharge: NURSING, REHAB OR LONG-TERM CARE | DRG: 196 | End: 2023-06-24
Attending: EMERGENCY MEDICINE | Admitting: STUDENT IN AN ORGANIZED HEALTH CARE EDUCATION/TRAINING PROGRAM
Payer: MEDICARE

## 2023-06-19 DIAGNOSIS — I10 ESSENTIAL HYPERTENSION: ICD-10-CM

## 2023-06-19 DIAGNOSIS — I99.8 ISCHEMIA: ICD-10-CM

## 2023-06-19 DIAGNOSIS — R06.00 DYSPNEA: ICD-10-CM

## 2023-06-19 DIAGNOSIS — I25.10 CAD S/P PERCUTANEOUS CORONARY ANGIOPLASTY: ICD-10-CM

## 2023-06-19 DIAGNOSIS — I48.0 PAROXYSMAL A-FIB (HCC): ICD-10-CM

## 2023-06-19 DIAGNOSIS — R09.02 HYPOXIA: Primary | ICD-10-CM

## 2023-06-19 DIAGNOSIS — Z98.61 CAD S/P PERCUTANEOUS CORONARY ANGIOPLASTY: ICD-10-CM

## 2023-06-19 PROBLEM — F02.B0 MODERATE LATE ONSET ALZHEIMER'S DEMENTIA (HCC): Status: ACTIVE | Noted: 2023-06-19

## 2023-06-19 PROBLEM — G30.1 MODERATE LATE ONSET ALZHEIMER'S DEMENTIA (HCC): Status: ACTIVE | Noted: 2023-06-19

## 2023-06-19 LAB
2HR DELTA HS TROPONIN: 0 NG/L
4HR DELTA HS TROPONIN: 2 NG/L
ALBUMIN SERPL BCP-MCNC: 3.3 G/DL (ref 3.5–5)
ALP SERPL-CCNC: 72 U/L (ref 34–104)
ALT SERPL W P-5'-P-CCNC: 8 U/L (ref 7–52)
ANION GAP SERPL CALCULATED.3IONS-SCNC: 7 MMOL/L (ref 4–13)
AST SERPL W P-5'-P-CCNC: 10 U/L (ref 13–39)
BASOPHILS # BLD AUTO: 0.03 THOUSANDS/ÂΜL (ref 0–0.1)
BASOPHILS NFR BLD AUTO: 0 % (ref 0–1)
BILIRUB SERPL-MCNC: 0.51 MG/DL (ref 0.2–1)
BNP SERPL-MCNC: 50 PG/ML (ref 0–100)
BUN SERPL-MCNC: 31 MG/DL (ref 5–25)
CALCIUM ALBUM COR SERPL-MCNC: 9.6 MG/DL (ref 8.3–10.1)
CALCIUM SERPL-MCNC: 9 MG/DL (ref 8.4–10.2)
CARDIAC TROPONIN I PNL SERPL HS: 26 NG/L
CARDIAC TROPONIN I PNL SERPL HS: 26 NG/L
CARDIAC TROPONIN I PNL SERPL HS: 28 NG/L
CHLORIDE SERPL-SCNC: 98 MMOL/L (ref 96–108)
CO2 SERPL-SCNC: 28 MMOL/L (ref 21–32)
CREAT SERPL-MCNC: 1.45 MG/DL (ref 0.6–1.3)
EOSINOPHIL # BLD AUTO: 0.37 THOUSAND/ÂΜL (ref 0–0.61)
EOSINOPHIL NFR BLD AUTO: 4 % (ref 0–6)
ERYTHROCYTE [DISTWIDTH] IN BLOOD BY AUTOMATED COUNT: 15.8 % (ref 11.6–15.1)
FLUAV RNA RESP QL NAA+PROBE: NEGATIVE
FLUBV RNA RESP QL NAA+PROBE: NEGATIVE
GFR SERPL CREATININE-BSD FRML MDRD: 44 ML/MIN/1.73SQ M
GLUCOSE SERPL-MCNC: 217 MG/DL (ref 65–140)
GLUCOSE SERPL-MCNC: 270 MG/DL (ref 65–140)
HCT VFR BLD AUTO: 34.8 % (ref 36.5–49.3)
HGB BLD-MCNC: 10.5 G/DL (ref 12–17)
IMM GRANULOCYTES # BLD AUTO: 0.03 THOUSAND/UL (ref 0–0.2)
IMM GRANULOCYTES NFR BLD AUTO: 0 % (ref 0–2)
LYMPHOCYTES # BLD AUTO: 0.84 THOUSANDS/ÂΜL (ref 0.6–4.47)
LYMPHOCYTES NFR BLD AUTO: 9 % (ref 14–44)
MCH RBC QN AUTO: 25.1 PG (ref 26.8–34.3)
MCHC RBC AUTO-ENTMCNC: 30.2 G/DL (ref 31.4–37.4)
MCV RBC AUTO: 83 FL (ref 82–98)
MONOCYTES # BLD AUTO: 0.84 THOUSAND/ÂΜL (ref 0.17–1.22)
MONOCYTES NFR BLD AUTO: 9 % (ref 4–12)
NEUTROPHILS # BLD AUTO: 7.45 THOUSANDS/ÂΜL (ref 1.85–7.62)
NEUTS SEG NFR BLD AUTO: 78 % (ref 43–75)
NRBC BLD AUTO-RTO: 0 /100 WBCS
PHOSPHATE SERPL-MCNC: 3.2 MG/DL (ref 2.3–4.1)
PLATELET # BLD AUTO: 323 THOUSANDS/UL (ref 149–390)
PMV BLD AUTO: 9.8 FL (ref 8.9–12.7)
POTASSIUM SERPL-SCNC: 4.3 MMOL/L (ref 3.5–5.3)
PROCALCITONIN SERPL-MCNC: 0.27 NG/ML
PROT SERPL-MCNC: 6.7 G/DL (ref 6.4–8.4)
RBC # BLD AUTO: 4.19 MILLION/UL (ref 3.88–5.62)
RSV RNA RESP QL NAA+PROBE: NEGATIVE
SARS-COV-2 RNA RESP QL NAA+PROBE: NEGATIVE
SODIUM SERPL-SCNC: 133 MMOL/L (ref 135–147)
WBC # BLD AUTO: 9.56 THOUSAND/UL (ref 4.31–10.16)

## 2023-06-19 PROCEDURE — 84145 PROCALCITONIN (PCT): CPT | Performed by: EMERGENCY MEDICINE

## 2023-06-19 PROCEDURE — 36415 COLL VENOUS BLD VENIPUNCTURE: CPT | Performed by: EMERGENCY MEDICINE

## 2023-06-19 PROCEDURE — 87154 CUL TYP ID BLD PTHGN 6+ TRGT: CPT | Performed by: EMERGENCY MEDICINE

## 2023-06-19 PROCEDURE — 71045 X-RAY EXAM CHEST 1 VIEW: CPT

## 2023-06-19 PROCEDURE — 94640 AIRWAY INHALATION TREATMENT: CPT

## 2023-06-19 PROCEDURE — 82948 REAGENT STRIP/BLOOD GLUCOSE: CPT

## 2023-06-19 PROCEDURE — 84484 ASSAY OF TROPONIN QUANT: CPT | Performed by: EMERGENCY MEDICINE

## 2023-06-19 PROCEDURE — 80053 COMPREHEN METABOLIC PANEL: CPT | Performed by: EMERGENCY MEDICINE

## 2023-06-19 PROCEDURE — 99223 1ST HOSP IP/OBS HIGH 75: CPT | Performed by: STUDENT IN AN ORGANIZED HEALTH CARE EDUCATION/TRAINING PROGRAM

## 2023-06-19 PROCEDURE — 94760 N-INVAS EAR/PLS OXIMETRY 1: CPT

## 2023-06-19 PROCEDURE — 84100 ASSAY OF PHOSPHORUS: CPT | Performed by: STUDENT IN AN ORGANIZED HEALTH CARE EDUCATION/TRAINING PROGRAM

## 2023-06-19 PROCEDURE — 85025 COMPLETE CBC W/AUTO DIFF WBC: CPT | Performed by: EMERGENCY MEDICINE

## 2023-06-19 PROCEDURE — 99285 EMERGENCY DEPT VISIT HI MDM: CPT

## 2023-06-19 PROCEDURE — 0241U HB NFCT DS VIR RESP RNA 4 TRGT: CPT | Performed by: EMERGENCY MEDICINE

## 2023-06-19 PROCEDURE — 83880 ASSAY OF NATRIURETIC PEPTIDE: CPT | Performed by: EMERGENCY MEDICINE

## 2023-06-19 PROCEDURE — 87040 BLOOD CULTURE FOR BACTERIA: CPT | Performed by: EMERGENCY MEDICINE

## 2023-06-19 RX ORDER — MIDODRINE HYDROCHLORIDE 5 MG/1
5 TABLET ORAL
Status: DISCONTINUED | OUTPATIENT
Start: 2023-06-19 | End: 2023-06-24 | Stop reason: HOSPADM

## 2023-06-19 RX ORDER — FORMOTEROL FUMARATE 20 UG/2ML
20 SOLUTION RESPIRATORY (INHALATION)
Status: DISCONTINUED | OUTPATIENT
Start: 2023-06-19 | End: 2023-06-24 | Stop reason: HOSPADM

## 2023-06-19 RX ORDER — CEFTRIAXONE 1 G/50ML
1000 INJECTION, SOLUTION INTRAVENOUS ONCE
Status: COMPLETED | OUTPATIENT
Start: 2023-06-19 | End: 2023-06-19

## 2023-06-19 RX ORDER — AZITHROMYCIN 250 MG/1
500 TABLET, FILM COATED ORAL EVERY 24 HOURS
Status: DISCONTINUED | OUTPATIENT
Start: 2023-06-19 | End: 2023-06-19

## 2023-06-19 RX ORDER — GUAIFENESIN 600 MG/1
1200 TABLET, EXTENDED RELEASE ORAL 2 TIMES DAILY
Status: DISCONTINUED | OUTPATIENT
Start: 2023-06-19 | End: 2023-06-24 | Stop reason: HOSPADM

## 2023-06-19 RX ORDER — METHYLPREDNISOLONE SODIUM SUCCINATE 40 MG/ML
40 INJECTION, POWDER, LYOPHILIZED, FOR SOLUTION INTRAMUSCULAR; INTRAVENOUS EVERY 8 HOURS
Status: DISCONTINUED | OUTPATIENT
Start: 2023-06-19 | End: 2023-06-19

## 2023-06-19 RX ORDER — TAMSULOSIN HYDROCHLORIDE 0.4 MG/1
0.4 CAPSULE ORAL
Status: DISCONTINUED | OUTPATIENT
Start: 2023-06-19 | End: 2023-06-24 | Stop reason: HOSPADM

## 2023-06-19 RX ORDER — FUROSEMIDE 10 MG/ML
40 INJECTION INTRAMUSCULAR; INTRAVENOUS ONCE
Status: COMPLETED | OUTPATIENT
Start: 2023-06-19 | End: 2023-06-19

## 2023-06-19 RX ORDER — SODIUM CHLORIDE FOR INHALATION 0.9 %
3 VIAL, NEBULIZER (ML) INHALATION
Status: DISCONTINUED | OUTPATIENT
Start: 2023-06-19 | End: 2023-06-19

## 2023-06-19 RX ORDER — PREDNISONE 20 MG/1
40 TABLET ORAL DAILY
Status: COMPLETED | OUTPATIENT
Start: 2023-06-20 | End: 2023-06-24

## 2023-06-19 RX ORDER — METOPROLOL SUCCINATE 25 MG/1
25 TABLET, EXTENDED RELEASE ORAL DAILY
Status: DISCONTINUED | OUTPATIENT
Start: 2023-06-20 | End: 2023-06-22

## 2023-06-19 RX ORDER — INSULIN LISPRO 100 [IU]/ML
1-6 INJECTION, SOLUTION INTRAVENOUS; SUBCUTANEOUS
Status: DISCONTINUED | OUTPATIENT
Start: 2023-06-19 | End: 2023-06-20

## 2023-06-19 RX ORDER — METHYLPREDNISOLONE SODIUM SUCCINATE 125 MG/2ML
60 INJECTION, POWDER, LYOPHILIZED, FOR SOLUTION INTRAMUSCULAR; INTRAVENOUS ONCE
Status: COMPLETED | OUTPATIENT
Start: 2023-06-19 | End: 2023-06-19

## 2023-06-19 RX ORDER — ACETAMINOPHEN 325 MG/1
650 TABLET ORAL EVERY 6 HOURS PRN
Status: DISCONTINUED | OUTPATIENT
Start: 2023-06-19 | End: 2023-06-24 | Stop reason: HOSPADM

## 2023-06-19 RX ORDER — BUDESONIDE 0.5 MG/2ML
0.5 INHALANT ORAL
Status: DISCONTINUED | OUTPATIENT
Start: 2023-06-19 | End: 2023-06-24 | Stop reason: HOSPADM

## 2023-06-19 RX ORDER — SENNOSIDES 8.6 MG
1 TABLET ORAL DAILY
Status: DISCONTINUED | OUTPATIENT
Start: 2023-06-20 | End: 2023-06-24 | Stop reason: HOSPADM

## 2023-06-19 RX ORDER — CEFTRIAXONE 1 G/50ML
1000 INJECTION, SOLUTION INTRAVENOUS EVERY 24 HOURS
Status: DISCONTINUED | OUTPATIENT
Start: 2023-06-20 | End: 2023-06-19

## 2023-06-19 RX ORDER — ESCITALOPRAM OXALATE 10 MG/1
10 TABLET ORAL
Status: DISCONTINUED | OUTPATIENT
Start: 2023-06-19 | End: 2023-06-24 | Stop reason: HOSPADM

## 2023-06-19 RX ORDER — IPRATROPIUM BROMIDE AND ALBUTEROL SULFATE 2.5; .5 MG/3ML; MG/3ML
3 SOLUTION RESPIRATORY (INHALATION) ONCE
Status: COMPLETED | OUTPATIENT
Start: 2023-06-19 | End: 2023-06-19

## 2023-06-19 RX ORDER — LEVALBUTEROL INHALATION SOLUTION 1.25 MG/3ML
1.25 SOLUTION RESPIRATORY (INHALATION)
Status: DISCONTINUED | OUTPATIENT
Start: 2023-06-19 | End: 2023-06-24 | Stop reason: HOSPADM

## 2023-06-19 RX ORDER — ASPIRIN 81 MG/1
81 TABLET, CHEWABLE ORAL DAILY
Status: DISCONTINUED | OUTPATIENT
Start: 2023-06-20 | End: 2023-06-24 | Stop reason: HOSPADM

## 2023-06-19 RX ORDER — BUSPIRONE HYDROCHLORIDE 5 MG/1
5 TABLET ORAL 2 TIMES DAILY
Status: DISCONTINUED | OUTPATIENT
Start: 2023-06-19 | End: 2023-06-24 | Stop reason: HOSPADM

## 2023-06-19 RX ORDER — INSULIN GLARGINE 100 [IU]/ML
20 INJECTION, SOLUTION SUBCUTANEOUS
Status: DISCONTINUED | OUTPATIENT
Start: 2023-06-19 | End: 2023-06-20

## 2023-06-19 RX ADMIN — LEVALBUTEROL HYDROCHLORIDE 1.25 MG: 1.25 SOLUTION RESPIRATORY (INHALATION) at 19:53

## 2023-06-19 RX ADMIN — METHYLPREDNISOLONE SODIUM SUCCINATE 60 MG: 125 INJECTION, POWDER, FOR SOLUTION INTRAMUSCULAR; INTRAVENOUS at 21:46

## 2023-06-19 RX ADMIN — INSULIN LISPRO 2 UNITS: 100 INJECTION, SOLUTION INTRAVENOUS; SUBCUTANEOUS at 21:44

## 2023-06-19 RX ADMIN — APIXABAN 2.5 MG: 2.5 TABLET, FILM COATED ORAL at 21:45

## 2023-06-19 RX ADMIN — IPRATROPIUM BROMIDE AND ALBUTEROL SULFATE 3 ML: .5; 3 SOLUTION RESPIRATORY (INHALATION) at 16:03

## 2023-06-19 RX ADMIN — TAMSULOSIN HYDROCHLORIDE 0.4 MG: 0.4 CAPSULE ORAL at 21:45

## 2023-06-19 RX ADMIN — CEFTRIAXONE 1000 MG: 1 INJECTION, SOLUTION INTRAVENOUS at 17:53

## 2023-06-19 RX ADMIN — MIDODRINE HYDROCHLORIDE 5 MG: 5 TABLET ORAL at 21:45

## 2023-06-19 RX ADMIN — INSULIN GLARGINE 20 UNITS: 100 INJECTION, SOLUTION SUBCUTANEOUS at 21:45

## 2023-06-19 RX ADMIN — BUSPIRONE HYDROCHLORIDE 5 MG: 5 TABLET ORAL at 21:45

## 2023-06-19 RX ADMIN — AZITHROMYCIN MONOHYDRATE 500 MG: 500 INJECTION, POWDER, LYOPHILIZED, FOR SOLUTION INTRAVENOUS at 21:58

## 2023-06-19 RX ADMIN — BUDESONIDE 0.5 MG: 0.5 INHALANT ORAL at 19:53

## 2023-06-19 RX ADMIN — FUROSEMIDE 40 MG: 10 INJECTION, SOLUTION INTRAVENOUS at 17:50

## 2023-06-19 RX ADMIN — IPRATROPIUM BROMIDE 0.5 MG: 0.5 SOLUTION RESPIRATORY (INHALATION) at 19:54

## 2023-06-19 RX ADMIN — ESCITALOPRAM OXALATE 10 MG: 10 TABLET ORAL at 21:45

## 2023-06-19 RX ADMIN — GUAIFENESIN 1200 MG: 600 TABLET, EXTENDED RELEASE ORAL at 21:45

## 2023-06-19 NOTE — ASSESSMENT & PLAN NOTE
Patient presents with acute hypoxic respiratory failure in setting of possible idiopathic pulmonary fibrosis  Patient admits to having increased shortness of breath with increased cough no sputum production    CT chest in May 2023 showed the following: Interstitial lung fibrosis  The distribution is somewhat atypical for UIP given lack of a clear apical basal gradient, and honeycombing in the anterior lung fields, and findings suggestive of air trapping  Admission chest x-ray official read pending but noted for diffuse parenchymal disease  Looking retrospectively it appears better than the December 2022 chest x-ray  Follow-up official read    Currently on 2 L oxygen  Noted to have diffuse crackles on exam which I suspect is secondary to pulmonary fibrosis    Status post ceftriaxone and azithromycin in the ER  Patient's only symptoms are shortness of breath and nonproductive cough  Very low suspicion for infectious etiology given patient has no other signs and symptoms of infection, is afebrile, no leukocytosis  Follow-up procalcitonin  Status post DuoNeb and Lasix 40 IV in the ER  Will give Solu-Medrol 60 mg IV times once and placed on prednisone 40 mg daily for 5 days  Placed on around-the-clock Xopenex, Atrovent, and Pulmicort nebulizer treatments  We will hold further Lasix for now as patient does not appear to be overtly volume overloaded and suspect the crackles are secondary to pulmonary fibrosis and not from pulmonary edema

## 2023-06-19 NOTE — ASSESSMENT & PLAN NOTE
Continue ASA, Eliquis, Statin    Stress test 5/8:  Stress Combined Conclusion: Left ventricular perfusion is abnormal   There is a partially reversible inferior lateral defect extending from base to mid area  EF is 62%  TID index quantitatively measured to be 1 5    Visually appears less cannot rule out balanced ischemia    Given dementia most likely no utility in further invasive tasting, could consider discussing with cardio

## 2023-06-19 NOTE — ASSESSMENT & PLAN NOTE
Lab Results   Component Value Date    EGFR 44 06/19/2023    EGFR 42 01/03/2023    EGFR 39 01/02/2023    CREATININE 1 45 (H) 06/19/2023    CREATININE 1 51 (H) 01/03/2023    CREATININE 1 60 (H) 01/02/2023     Creatinine baseline around 1 4-1 6  Currently at baseline

## 2023-06-19 NOTE — ED PROVIDER NOTES
History  Chief Complaint   Patient presents with   • Shortness of Breath     Pt presents via paramedics, reports patient was feeling short of breath at nursing home, reports on room air patient o2 89%  2L nasal canula patient at  93%  HPI  Patient is an 45-year-old male history of CAD, diabetes, anticoagulated on Eliquis presenting for evaluation of dyspnea  Patient states he has felt short of breath for the past day  Patient states that he was relatively asymptomatic the previous day  Patient denies chest pain, palpitations, diaphoresis, nausea, vomiting, syncope or near syncope  Patient denies cough, wheeze, fevers, chills, recent travel or sick contacts  Patient denies headache, sore throat, myalgias  Patient denies lower extremity pain or swelling, recent immobilization or surgery, history of DVT/PE  Patient denies orthopnea, recent weight gain, history of heart failure  Patient states that he has never felt like this in the past   Patient hypoxic into the 80s on room air, maintaining sats on 2 L nasal cannula  Prior to Admission Medications   Prescriptions Last Dose Informant Patient Reported?  Taking?   acetaminophen (TYLENOL) 325 mg tablet   No No   Sig: Take 2 tablets (650 mg total) by mouth every 6 (six) hours as needed for mild pain   apixaban (Eliquis) 2 5 mg   No No   Sig: Take 1 tablet (2 5 mg total) by mouth 2 (two) times a day   aspirin 81 mg chewable tablet  Self Yes No   Sig: Chew 81 mg daily   busPIRone (BUSPAR) 5 mg tablet   No No   Sig: Take 1 tablet (5 mg total) by mouth 2 (two) times a day   cholecalciferol (VITAMIN D3) 1,000 units tablet  Self Yes No   Sig: Take 1,000 Units by mouth daily   escitalopram (Lexapro) 10 mg tablet   No No   Sig: Take 1 tablet (10 mg total) by mouth daily at bedtime   insulin glargine (TOUJEO) 300 units/mL CONCENTRATED U-300 injection pen (1-unit dial)  Self Yes No   Sig: Inject 30 Units under the skin daily at bedtime   metoprolol succinate (TOPROL-XL) 25 mg 24 hr tablet   No No   Sig: Take 1 tablet (25 mg total) by mouth daily with hold for systolic blood pressure less than 100 or heart rate is less than 60 bpm   midodrine (PROAMATINE) 5 mg tablet   No No   Sig: Take 1 tablet (5 mg total) by mouth 3 (three) times a day before meals   omega-3-acid ethyl esters (LOVAZA) 1 g capsule  Self Yes No   Sig: Take 1 g by mouth 2 (two) times a day   tamsulosin (FLOMAX) 0 4 mg  Self Yes No   Sig: Take 0 4 mg by mouth daily with dinner   vitamin B-12 (VITAMIN B-12) 1,000 mcg tablet  Self Yes No   Sig: Take 1,000 mcg by mouth daily      Facility-Administered Medications: None       Past Medical History:   Diagnosis Date   • Coronary artery disease 2018    Severe MLAD Stenosis       Past Surgical History:   Procedure Laterality Date   • CORONARY STENT PLACEMENT  2018    PCI with MCKENZIE to LAD       History reviewed  No pertinent family history  I have reviewed and agree with the history as documented  E-Cigarette/Vaping   • E-Cigarette Use Never User      E-Cigarette/Vaping Substances   • Nicotine No    • THC No    • CBD No    • Flavoring No    • Other No    • Unknown No      Social History     Tobacco Use   • Smoking status: Unknown   Vaping Use   • Vaping Use: Never used   Substance Use Topics   • Alcohol use: Not Currently   • Drug use: Never       Review of Systems   Constitutional: Negative for chills, fatigue and fever  HENT: Negative for congestion and sore throat  Respiratory: Positive for shortness of breath  Negative for cough and wheezing  Cardiovascular: Negative for chest pain, palpitations and leg swelling  Gastrointestinal: Negative for diarrhea, nausea and vomiting  Musculoskeletal: Negative for myalgias  Neurological: Negative for headaches  Psychiatric/Behavioral: Negative for confusion  All other systems reviewed and are negative  Physical Exam  Physical Exam  Vitals and nursing note reviewed     Constitutional:       General: He is not in acute distress  Appearance: Normal appearance  He is not ill-appearing, toxic-appearing or diaphoretic  Comments: Well-appearing, nontoxic, nondistressed   HENT:      Head: Normocephalic and atraumatic  Comments: Moist mucous membranes     Right Ear: External ear normal       Left Ear: External ear normal    Eyes:      General:         Right eye: No discharge  Left eye: No discharge  Cardiovascular:      Comments: Regular rate and rhythm, no murmurs rubs or gallops  Extremities warm and well-perfused without mottling  Pulmonary:      Effort: No respiratory distress  Comments: No increased work of breathing  Speaking in complete sentences  Diffuse Rales in all lung fields  Satting 93% on 2 L nasal cannula indicating borderline oxygenation  Abdominal:      General: There is no distension  Musculoskeletal:         General: No deformity  Cervical back: Normal range of motion  Comments: No lower extremity swelling, erythema, or calf tenderness   Skin:     Findings: No lesion or rash  Neurological:      Mental Status: He is alert and oriented to person, place, and time  Mental status is at baseline        Comments: AAOx4, pleasant, interactive   Psychiatric:         Mood and Affect: Mood and affect normal          Vital Signs  ED Triage Vitals   Temperature Pulse Respirations Blood Pressure SpO2   06/19/23 1548 06/19/23 1547 06/19/23 1547 06/19/23 1547 06/19/23 1547   98 °F (36 7 °C) 88 18 126/73 98 %      Temp Source Heart Rate Source Patient Position - Orthostatic VS BP Location FiO2 (%)   06/19/23 1548 -- -- -- --   Oral          Pain Score       06/19/23 1547       No Pain           Vitals:    06/19/23 1547 06/19/23 1630   BP: 126/73 (!) 151/106   Pulse: 88 82         Visual Acuity      ED Medications  Medications   furosemide (LASIX) injection 40 mg (has no administration in time range)   cefTRIAXone (ROCEPHIN) IVPB (premix in dextrose) 1,000 mg 50 mL (has no administration in time range)   azithromycin (ZITHROMAX) 500 mg in sodium chloride 0 9% 250mL IVPB 500 mg (has no administration in time range)   ipratropium-albuterol (DUO-NEB) 0 5-2 5 mg/3 mL inhalation solution 3 mL (3 mL Nebulization Given 6/19/23 1603)       Diagnostic Studies  Results Reviewed     Procedure Component Value Units Date/Time    HS Troponin I 2hr [[id number]] Collected: 06/19/23 1725    Lab Status: No result Specimen: Blood from Arm, Left     COVID/FLU/RSV [[id number]] Collected: 06/19/23 1725    Lab Status: No result Specimen: Nares from Nose     Blood culture #1 [[id number]] Collected: 06/19/23 1725    Lab Status: No result Specimen: Blood from Arm, Left     Blood culture #2 [[id number]]     Lab Status: No result Specimen: Blood     Procalcitonin [[id number]]     Lab Status: No result Specimen: Blood     HS Troponin I 4hr [[id number]]     Lab Status: No result Specimen: Blood     HS Troponin 0hr (reflex protocol) [[id number]]  (Normal) Collected: 06/19/23 1558    Lab Status: Final result Specimen: Blood from Arm, Left Updated: 06/19/23 1629     hs TnI 0hr 26 ng/L     B-Type Natriuretic Peptide(BNP) [[id number]]  (Normal) Collected: 06/19/23 1558    Lab Status: Final result Specimen: Blood from Arm, Left Updated: 06/19/23 1628     BNP 50 pg/mL     Comprehensive metabolic panel [[id number]]  (Abnormal) Collected: 06/19/23 1558    Lab Status: Final result Specimen: Blood from Arm, Left Updated: 06/19/23 1622     Sodium 133 mmol/L      Potassium 4 3 mmol/L      Chloride 98 mmol/L      CO2 28 mmol/L      ANION GAP 7 mmol/L      BUN 31 mg/dL      Creatinine 1 45 mg/dL      Glucose 270 mg/dL      Calcium 9 0 mg/dL      Corrected Calcium 9 6 mg/dL      AST 10 U/L      ALT 8 U/L      Alkaline Phosphatase 72 U/L      Total Protein 6 7 g/dL      Albumin 3 3 g/dL      Total Bilirubin 0 51 mg/dL      eGFR 44 ml/min/1 73sq m     Narrative:      [place] guidelines for Chronic Kidney Disease (CKD): •  Stage 1 with normal or high GFR (GFR > 90 mL/min/1 73 square meters)  •  Stage 2 Mild CKD (GFR = 60-89 mL/min/1 73 square meters)  •  Stage 3A Moderate CKD (GFR = 45-59 mL/min/1 73 square meters)  •  Stage 3B Moderate CKD (GFR = 30-44 mL/min/1 73 square meters)  •  Stage 4 Severe CKD (GFR = 15-29 mL/min/1 73 square meters)  •  Stage 5 End Stage CKD (GFR <15 mL/min/1 73 square meters)  Note: GFR calculation is accurate only with a steady state creatinine    CBC and differential [[id number]]  (Abnormal) Collected: 06/19/23 1558    Lab Status: Final result Specimen: Blood from Arm, Left Updated: 06/19/23 1603     WBC 9 56 Thousand/uL      RBC 4 19 Million/uL      Hemoglobin 10 5 g/dL      Hematocrit 34 8 %      MCV 83 fL      MCH 25 1 pg      MCHC 30 2 g/dL      RDW 15 8 %      MPV 9 8 fL      Platelets 452 Thousands/uL      nRBC 0 /100 WBCs      Neutrophils Relative 78 %      Immat GRANS % 0 %      Lymphocytes Relative 9 %      Monocytes Relative 9 %      Eosinophils Relative 4 %      Basophils Relative 0 %      Neutrophils Absolute 7 45 Thousands/µL      Immature Grans Absolute 0 03 Thousand/uL      Lymphocytes Absolute 0 84 Thousands/µL      Monocytes Absolute 0 84 Thousand/µL      Eosinophils Absolute 0 37 Thousand/µL      Basophils Absolute 0 03 Thousands/µL                  XR chest 1 view portable    (Results Pending)              Procedures  Procedures         ED Course                               SBIRT 20yo+    Flowsheet Row Most Recent Value   Initial Alcohol Screen: US AUDIT-C     1  How often do you have a drink containing alcohol? 0 Filed at: 06/19/2023 1549   Audit-C Score 0 Filed at: 06/19/2023 1549   SAMUEL: How many times in the past year have you    Used an illegal drug or used a prescription medication for non-medical reasons? Never Filed at: 06/19/2023 1549                    Medical Decision Making  I obtained history from the patient    I reviewed external medical documentation which was noncontributory  Differential diagnosis includes but is not limited to CHF exacerbation, pleural effusion, pneumothorax, pneumonia  I ordered and reviewed labs including CBC, CMP, BNP, troponin to evaluate for electrolyte or renal derangement, leukocytosis, anemia, elevated troponin to indicate cardiac strain, elevated BNP to indicate CHF exacerbation  I ordered and independently interpreted plain films of the chest   I ordered and independently interpreted an EKG which demonstrates normal sinus rhythm rate of 83 with component of sinus arrhythmia, no ST or T wave abnormalities  Patient without significant lab derangement  Renal function at baseline  Per my independent interpretation, patient with evidence of pulmonary edema on chest x-ray  Patient treated with Lasix for possible CHF exacerbation and treated empirically with Rocephin, azithromycin for possible community-acquired pneumonia  I discussed patient with the hospitalist and admitted patient for further evaluation and management  Amount and/or Complexity of Data Reviewed  Labs: ordered  Radiology: ordered  Risk  Prescription drug management  Decision regarding hospitalization  Disposition  Final diagnoses:   Hypoxia   Dyspnea     Time reflects when diagnosis was documented in both MDM as applicable and the Disposition within this note     Time User Action Codes Description Comment    6/19/2023  5:30 PM Mary Angulo Add [R09 02] Hypoxia     6/19/2023  5:30 PM Mary Angulo Add [R06 00] Dyspnea       ED Disposition     ED Disposition   Admit    Condition   Stable    Date/Time   Mon Jun 19, 2023  5:29 PM    Comment   Case was discussed with GARRETT and the patient's admission status was agreed to be Admission Status: inpatient status to the service of Dr Jenny Castellon              Follow-up Information    None         Patient's Medications   Discharge Prescriptions    No medications on file       No discharge procedures on file     PDMP Review     None          ED Provider  Electronically Signed by           Simran Craven MD  06/19/23 7015

## 2023-06-19 NOTE — ASSESSMENT & PLAN NOTE
On my evaluation in the ER patient was very confused  He was unable to tell me why he was in the ER and where he came from  Discussed with patient's wife who states that this is his baseline as his dementia has been progressing over the last 6 months  Discussed CODE STATUS with patient's wife who states that the patient would want to be DO NOT RESUSCITATE DO NOT INTUBATE    CODE STATUS changed to level 3 DNR/DNI

## 2023-06-19 NOTE — ASSESSMENT & PLAN NOTE
"Lab Results   Component Value Date    HGBA1C 7 0 (H) 12/30/2022       No results for input(s): \"POCGLU\" in the last 72 hours      Blood Sugar Average: Last 72 hrs:     Carb consistent diet  Insulin sliding scale  "

## 2023-06-19 NOTE — H&P
Leah 45  H&P  Name: Michel Mon 80 y o  male I MRN: [id number]  Unit/Bed#: 18 [facility] 215 I Date of Admission: 6/19/2023   Date of Service: 6/19/2023 I Hospital Day: 0      Assessment/Plan   Chronic idiopathic pulmonary fibrosis [facility])  Assessment & Plan  Patient presents with acute hypoxic respiratory failure in setting of possible idiopathic pulmonary fibrosis  Patient admits to having increased shortness of breath with increased cough no sputum production    CT chest in May 2023 showed the following: Interstitial lung fibrosis  The distribution is somewhat atypical for UIP given lack of a clear apical basal gradient, and honeycombing in the anterior lung fields, and findings suggestive of air trapping  Admission chest x-ray official read pending but noted for diffuse parenchymal disease  Looking retrospectively it appears better than the December 2022 chest x-ray  Follow-up official read    Currently on 2 L oxygen  Noted to have diffuse crackles on exam which I suspect is secondary to pulmonary fibrosis    Status post ceftriaxone and azithromycin in the ER  Patient's only symptoms are shortness of breath and nonproductive cough  Very low suspicion for infectious etiology given patient has no other signs and symptoms of infection, is afebrile, no leukocytosis  Follow-up procalcitonin  Status post DuoNeb and Lasix 40 IV in the ER  Will give Solu-Medrol 60 mg IV times once and placed on prednisone 40 mg daily for 5 days  Placed on around-the-clock Xopenex, Atrovent, and Pulmicort nebulizer treatments  We will hold further Lasix for now as patient does not appear to be overtly volume overloaded and suspect the crackles are secondary to pulmonary fibrosis and not from pulmonary edema          CAD (coronary artery disease)  Assessment & Plan  Continue ASA, Eliquis, Statin    Stress test 5/8:  Stress Combined Conclusion: Left ventricular perfusion is abnormal   There is a "partially reversible inferior lateral defect extending from base to mid area  EF is 62%  TID index quantitatively measured to be 1 5  Visually appears less cannot rule out balanced ischemia    Given dementia most likely no utility in further invasive tasting, could consider discussing with cardio      Moderate late onset Alzheimer's dementia [facility])  Assessment & Plan  On my evaluation in the ER patient was very confused  He was unable to tell me why he was in the ER and where he came from  Discussed with patient's wife who states that this is his baseline as his dementia has been progressing over the last 6 months  Discussed CODE STATUS with patient's wife who states that the patient would want to be DO NOT RESUSCITATE DO NOT INTUBATE  CODE STATUS changed to level 3 DNR/DNI    Paroxysmal A-fib [facility])  Assessment & Plan  Continue Eliquis and Toprol-XL 25 mg daily    Stage 3b chronic kidney disease [facility])  Assessment & Plan  Lab Results   Component Value Date    EGFR 44 06/19/2023    EGFR 42 01/03/2023    EGFR 39 01/02/2023    CREATININE 1 45 (H) 06/19/2023    CREATININE 1 51 (H) 01/03/2023    CREATININE 1 60 (H) 01/02/2023     Creatinine baseline around 1 4-1 6  Currently at baseline    Benign prostatic hyperplasia without lower urinary tract symptoms  Assessment & Plan  Continue Flomax    Orthostatic hypotension  Assessment & Plan  Continue midodrine 5 mg 3 times daily    Essential hypertension  Assessment & Plan  Continue Toprol XL 25 mg    Type 2 diabetes mellitus, with long-term current use of insulin ([facility])  Assessment & Plan  Lab Results   Component Value Date    HGBA1C 7 0 (H) 12/30/2022       No results for input(s): \"POCGLU\" in the last 72 hours  Blood Sugar Average: Last 72 hrs:     Carb consistent diet  Insulin sliding scale       VTE Pharmacologic Prophylaxis:   Moderate Risk (Score 3-4) - Pharmacological DVT Prophylaxis Ordered: apixaban (Eliquis)    Code Status: Level 3 - DNAR and DNI   Discussion " with family: Updated  (wife) via phone  Anticipated Length of Stay: Patient will be admitted on an inpatient basis with an anticipated length of stay of greater than 2 midnights secondary to hypoxic respiratory failure  Total Time Spent on Date of Encounter in care of patient: 55 minutes This time was spent on one or more of the following: performing physical exam; counseling and coordination of care; obtaining or reviewing history; documenting in the medical record; reviewing/ordering tests, medications or procedures; communicating with other healthcare professionals and discussing with patient's family/caregivers  Chief Complaint: Shortness of breath    History of Present Illness:  Natty Bolden is a 80 y o  male with a PMH of dementia, pulmonary fibrosis, CAD, A-fib on Eliquis who presents with acute hypoxic respiratory failure  Due to dementia unable to obtain much history but patient admits to having shortness of breath with nonproductive cough over the last 24 hours  He denies having any fevers chills chest pain palpitations abdominal pain nausea vomiting diarrhea fevers or chills  Patient is confused and unable to recall where he came from and why he is here  Called patient's wife who states that this is his baseline for the last 6 months  She thinks that the patient has dementia that is advancing  She also states that he would want to be DNR/DNI and code level was adjusted accordingly       Review of Systems:  Review of Systems   Constitutional: Negative for chills and fever  HENT: Negative for ear pain and sore throat  Eyes: Negative for pain and visual disturbance  Respiratory: Positive for cough and shortness of breath  Cardiovascular: Negative for chest pain and palpitations  Gastrointestinal: Negative for abdominal pain and vomiting  Genitourinary: Negative for dysuria and hematuria  Musculoskeletal: Negative for arthralgias and back pain     Skin: Negative for color change and rash  Neurological: Negative for seizures and syncope  All other systems reviewed and are negative  Past Medical and Surgical History:   Past Medical History:   Diagnosis Date   • Coronary artery disease 2018    Severe MLAD Stenosis       Past Surgical History:   Procedure Laterality Date   • CORONARY STENT PLACEMENT  2018    PCI with MCKENZIE to LAD       Meds/Allergies:  Prior to Admission medications    Medication Sig Start Date End Date Taking?  Authorizing Provider   acetaminophen (TYLENOL) 325 mg tablet Take 2 tablets (650 mg total) by mouth every 6 (six) hours as needed for mild pain 2/23/23   [facility], MD   apixaban (Eliquis) 2 5 mg Take 1 tablet (2 5 mg total) by mouth 2 (two) times a day 5/9/23   [facility], MD   aspirin 81 mg chewable tablet Chew 81 mg daily    Historical Provider, MD   busPIRone (BUSPAR) 5 mg tablet Take 1 tablet (5 mg total) by mouth 2 (two) times a day 2/23/23   [facility], MD   cholecalciferol (VITAMIN D3) 1,000 units tablet Take 1,000 Units by mouth daily    Historical Provider, MD   escitalopram (Lexapro) 10 mg tablet Take 1 tablet (10 mg total) by mouth daily at bedtime 2/23/23   [facility], MD   insulin glargine (TOUJEO) 300 units/mL CONCENTRATED U-300 injection pen (1-unit dial) Inject 30 Units under the skin daily at bedtime    Historical Provider, MD   metoprolol succinate (TOPROL-XL) 25 mg 24 hr tablet Take 1 tablet (25 mg total) by mouth daily with hold for systolic blood pressure less than 100 or heart rate is less than 60 bpm 2/23/23   [place] [facility], MD   midodrine (PROAMATINE) 5 mg tablet Take 1 tablet (5 mg total) by mouth 3 (three) times a day before meals 2/23/23   Arleth Bradford, MD   [id number]-[id number]-[id number] ethyl esters (LOVAZA) 1 g capsule Take 1 g by mouth 2 (two) times a day    Historical Provider, MD   tamsulosin (FLOMAX) 0 4 mg Take 0 4 mg by mouth daily with dinner    Historical Provider, MD   vitamin B-12 (VITAMIN B-12) 1,000 mcg tablet Take 1,000 mcg by mouth daily    Historical Provider, MD     I have reviewed home medications using recent Epic encounter  Allergies: No Known Allergies    Social History:  Marital Status: /Civil Union   Occupation: retired  Patient Pre-hospital Living Situation: [facility]  Patient Pre-hospital Level of Mobility: unable to be assessed at time of evaluation  Patient Pre-hospital Diet Restrictions: diabetic  Substance Use History:   Social History     Substance and Sexual Activity   Alcohol Use Not Currently     Social History     Tobacco Use   Smoking Status Unknown   Smokeless Tobacco Not on file     Social History     Substance and Sexual Activity   Drug Use Never       Family History:  History reviewed  No pertinent family history  Physical Exam:     Vitals:   Blood Pressure: 137/84 (06/19/23 1730)  Pulse: 77 (06/19/23 1730)  Temperature: 98 5 °F (36 9 °C) (06/19/23 1730)  Temp Source: Oral (06/19/23 1730)  Respirations: (!) 25 (06/19/23 1730)  SpO2: 90 % (06/19/23 1730)    Physical Exam  Vitals and nursing note reviewed  Constitutional:       General: He is not in acute distress  Appearance: He is well-developed  HENT:      Head: Normocephalic and atraumatic  Eyes:      Conjunctiva/sclera: Conjunctivae normal    Cardiovascular:      Rate and Rhythm: Normal rate and regular rhythm  Heart sounds: No murmur heard  Pulmonary:      Effort: Pulmonary effort is normal       Comments: Diffuse crackles on 2 L nasal cannula  Abdominal:      Palpations: Abdomen is soft  Tenderness: There is no abdominal tenderness  Musculoskeletal:         General: No swelling  Cervical back: Neck supple  Skin:     General: Skin is warm and dry  Capillary Refill: Capillary refill takes less than 2 seconds  Neurological:      Mental Status: He is alert  Mental status is at baseline  He is disoriented     Psychiatric:         Mood and Affect: Mood normal  Additional Data:     Lab Results:  Results from last 7 days   Lab Units 06/19/23  1558   WBC Thousand/uL 9 56   HEMOGLOBIN g/dL 10 5*   HEMATOCRIT % 34 8*   PLATELETS Thousands/uL 323   NEUTROS PCT % 78*   LYMPHS PCT % 9*   MONOS PCT % 9   EOS PCT % 4     Results from last 7 days   Lab Units 06/19/23  1558   SODIUM mmol/L 133*   POTASSIUM mmol/L 4 3   CHLORIDE mmol/L 98   CO2 mmol/L 28   BUN mg/dL 31*   CREATININE mg/dL 1 45*   ANION GAP mmol/L 7   CALCIUM mg/dL 9 0   ALBUMIN g/dL 3 3*   TOTAL BILIRUBIN mg/dL 0 51   ALK PHOS U/L 72   ALT U/L 8   AST U/L 10*   GLUCOSE RANDOM mg/dL 270*                       Lines/Drains:  Invasive Devices     Peripheral Intravenous Line  Duration           Peripheral IV 05/08/23 Distal;Left;Upper;Ventral (anterior) Arm 42 days    Peripheral IV 06/19/23 Left Antecubital <1 day                    Imaging: Reviewed radiology reports from this admission including: chest xray  XR chest 1 view portable    (Results Pending)       EKG and Other Studies Reviewed on Admission:   · EKG: NSR  HR 83     ** Please Note: This note has been constructed using a voice recognition system   **

## 2023-06-20 PROBLEM — J96.01 ACUTE RESPIRATORY FAILURE WITH HYPOXIA (HCC): Status: ACTIVE | Noted: 2023-06-20

## 2023-06-20 LAB
ANION GAP SERPL CALCULATED.3IONS-SCNC: 11 MMOL/L (ref 4–13)
BUN SERPL-MCNC: 34 MG/DL (ref 5–25)
CALCIUM SERPL-MCNC: 9.1 MG/DL (ref 8.4–10.2)
CHLORIDE SERPL-SCNC: 99 MMOL/L (ref 96–108)
CO2 SERPL-SCNC: 24 MMOL/L (ref 21–32)
CREAT SERPL-MCNC: 1.44 MG/DL (ref 0.6–1.3)
ERYTHROCYTE [DISTWIDTH] IN BLOOD BY AUTOMATED COUNT: 15.7 % (ref 11.6–15.1)
GFR SERPL CREATININE-BSD FRML MDRD: 44 ML/MIN/1.73SQ M
GLUCOSE SERPL-MCNC: 272 MG/DL (ref 65–140)
GLUCOSE SERPL-MCNC: 291 MG/DL (ref 65–140)
GLUCOSE SERPL-MCNC: 372 MG/DL (ref 65–140)
GLUCOSE SERPL-MCNC: 387 MG/DL (ref 65–140)
GLUCOSE SERPL-MCNC: 427 MG/DL (ref 65–140)
GLUCOSE SERPL-MCNC: 428 MG/DL (ref 65–140)
HCT VFR BLD AUTO: 36.1 % (ref 36.5–49.3)
HGB BLD-MCNC: 11.1 G/DL (ref 12–17)
MAGNESIUM SERPL-MCNC: 1.8 MG/DL (ref 1.9–2.7)
MAGNESIUM SERPL-MCNC: 1.8 MG/DL (ref 1.9–2.7)
MCH RBC QN AUTO: 25.6 PG (ref 26.8–34.3)
MCHC RBC AUTO-ENTMCNC: 30.7 G/DL (ref 31.4–37.4)
MCV RBC AUTO: 83 FL (ref 82–98)
PHOSPHATE SERPL-MCNC: 4.7 MG/DL (ref 2.3–4.1)
PLATELET # BLD AUTO: 355 THOUSANDS/UL (ref 149–390)
PMV BLD AUTO: 10.1 FL (ref 8.9–12.7)
POTASSIUM SERPL-SCNC: 4.6 MMOL/L (ref 3.5–5.3)
RBC # BLD AUTO: 4.33 MILLION/UL (ref 3.88–5.62)
SODIUM SERPL-SCNC: 134 MMOL/L (ref 135–147)
WBC # BLD AUTO: 7.55 THOUSAND/UL (ref 4.31–10.16)

## 2023-06-20 PROCEDURE — 84100 ASSAY OF PHOSPHORUS: CPT | Performed by: STUDENT IN AN ORGANIZED HEALTH CARE EDUCATION/TRAINING PROGRAM

## 2023-06-20 PROCEDURE — 94640 AIRWAY INHALATION TREATMENT: CPT

## 2023-06-20 PROCEDURE — 82948 REAGENT STRIP/BLOOD GLUCOSE: CPT

## 2023-06-20 PROCEDURE — 80048 BASIC METABOLIC PNL TOTAL CA: CPT | Performed by: STUDENT IN AN ORGANIZED HEALTH CARE EDUCATION/TRAINING PROGRAM

## 2023-06-20 PROCEDURE — 83735 ASSAY OF MAGNESIUM: CPT | Performed by: STUDENT IN AN ORGANIZED HEALTH CARE EDUCATION/TRAINING PROGRAM

## 2023-06-20 PROCEDURE — 99232 SBSQ HOSP IP/OBS MODERATE 35: CPT | Performed by: STUDENT IN AN ORGANIZED HEALTH CARE EDUCATION/TRAINING PROGRAM

## 2023-06-20 PROCEDURE — 94760 N-INVAS EAR/PLS OXIMETRY 1: CPT

## 2023-06-20 PROCEDURE — 85027 COMPLETE CBC AUTOMATED: CPT | Performed by: STUDENT IN AN ORGANIZED HEALTH CARE EDUCATION/TRAINING PROGRAM

## 2023-06-20 RX ORDER — HEPARIN SODIUM 5000 [USP'U]/ML
5000 INJECTION, SOLUTION INTRAVENOUS; SUBCUTANEOUS EVERY 8 HOURS SCHEDULED
Status: DISCONTINUED | OUTPATIENT
Start: 2023-06-20 | End: 2023-06-23

## 2023-06-20 RX ORDER — INSULIN LISPRO 100 [IU]/ML
2-12 INJECTION, SOLUTION INTRAVENOUS; SUBCUTANEOUS
Status: DISCONTINUED | OUTPATIENT
Start: 2023-06-20 | End: 2023-06-24 | Stop reason: HOSPADM

## 2023-06-20 RX ORDER — INSULIN GLARGINE 100 [IU]/ML
30 INJECTION, SOLUTION SUBCUTANEOUS
Status: DISCONTINUED | OUTPATIENT
Start: 2023-06-20 | End: 2023-06-24 | Stop reason: HOSPADM

## 2023-06-20 RX ORDER — INSULIN LISPRO 100 [IU]/ML
2-12 INJECTION, SOLUTION INTRAVENOUS; SUBCUTANEOUS
Status: DISCONTINUED | OUTPATIENT
Start: 2023-06-21 | End: 2023-06-24 | Stop reason: HOSPADM

## 2023-06-20 RX ORDER — BENZONATATE 100 MG/1
100 CAPSULE ORAL 3 TIMES DAILY PRN
Status: DISCONTINUED | OUTPATIENT
Start: 2023-06-20 | End: 2023-06-24 | Stop reason: HOSPADM

## 2023-06-20 RX ADMIN — PREDNISONE 40 MG: 20 TABLET ORAL at 10:00

## 2023-06-20 RX ADMIN — FORMOTEROL FUMARATE DIHYDRATE 20 MCG: 20 SOLUTION RESPIRATORY (INHALATION) at 07:04

## 2023-06-20 RX ADMIN — BUDESONIDE 0.5 MG: 0.5 INHALANT ORAL at 07:04

## 2023-06-20 RX ADMIN — METOPROLOL SUCCINATE 25 MG: 25 TABLET, EXTENDED RELEASE ORAL at 10:00

## 2023-06-20 RX ADMIN — MIDODRINE HYDROCHLORIDE 5 MG: 5 TABLET ORAL at 06:56

## 2023-06-20 RX ADMIN — ASPIRIN 81 MG CHEWABLE TABLET 81 MG: 81 TABLET CHEWABLE at 10:00

## 2023-06-20 RX ADMIN — BUDESONIDE 0.5 MG: 0.5 INHALANT ORAL at 20:01

## 2023-06-20 RX ADMIN — MIDODRINE HYDROCHLORIDE 5 MG: 5 TABLET ORAL at 17:00

## 2023-06-20 RX ADMIN — TAMSULOSIN HYDROCHLORIDE 0.4 MG: 0.4 CAPSULE ORAL at 17:00

## 2023-06-20 RX ADMIN — IPRATROPIUM BROMIDE 0.5 MG: 0.5 SOLUTION RESPIRATORY (INHALATION) at 20:01

## 2023-06-20 RX ADMIN — SENNOSIDES 8.6 MG: 8.6 TABLET, FILM COATED ORAL at 10:00

## 2023-06-20 RX ADMIN — LEVALBUTEROL HYDROCHLORIDE 1.25 MG: 1.25 SOLUTION RESPIRATORY (INHALATION) at 20:01

## 2023-06-20 RX ADMIN — INSULIN LISPRO 6 UNITS: 100 INJECTION, SOLUTION INTRAVENOUS; SUBCUTANEOUS at 11:41

## 2023-06-20 RX ADMIN — AZITHROMYCIN MONOHYDRATE 500 MG: 500 INJECTION, POWDER, LYOPHILIZED, FOR SOLUTION INTRAVENOUS at 12:56

## 2023-06-20 RX ADMIN — INSULIN LISPRO 12 UNITS: 100 INJECTION, SOLUTION INTRAVENOUS; SUBCUTANEOUS at 21:29

## 2023-06-20 RX ADMIN — FORMOTEROL FUMARATE DIHYDRATE 20 MCG: 20 SOLUTION RESPIRATORY (INHALATION) at 20:26

## 2023-06-20 RX ADMIN — LEVALBUTEROL HYDROCHLORIDE 1.25 MG: 1.25 SOLUTION RESPIRATORY (INHALATION) at 07:04

## 2023-06-20 RX ADMIN — APIXABAN 2.5 MG: 2.5 TABLET, FILM COATED ORAL at 10:00

## 2023-06-20 RX ADMIN — HEPARIN SODIUM 5000 UNITS: 5000 INJECTION INTRAVENOUS; SUBCUTANEOUS at 15:43

## 2023-06-20 RX ADMIN — IPRATROPIUM BROMIDE 0.5 MG: 0.5 SOLUTION RESPIRATORY (INHALATION) at 13:30

## 2023-06-20 RX ADMIN — ESCITALOPRAM OXALATE 10 MG: 10 TABLET ORAL at 21:28

## 2023-06-20 RX ADMIN — INSULIN LISPRO 6 UNITS: 100 INJECTION, SOLUTION INTRAVENOUS; SUBCUTANEOUS at 17:00

## 2023-06-20 RX ADMIN — MIDODRINE HYDROCHLORIDE 5 MG: 5 TABLET ORAL at 12:00

## 2023-06-20 RX ADMIN — INSULIN GLARGINE 30 UNITS: 100 INJECTION, SOLUTION SUBCUTANEOUS at 21:28

## 2023-06-20 RX ADMIN — IPRATROPIUM BROMIDE 0.5 MG: 0.5 SOLUTION RESPIRATORY (INHALATION) at 07:04

## 2023-06-20 RX ADMIN — LEVALBUTEROL HYDROCHLORIDE 1.25 MG: 1.25 SOLUTION RESPIRATORY (INHALATION) at 13:30

## 2023-06-20 RX ADMIN — GUAIFENESIN 1200 MG: 600 TABLET, EXTENDED RELEASE ORAL at 10:14

## 2023-06-20 RX ADMIN — CEFEPIME 2000 MG: 2 INJECTION, POWDER, FOR SOLUTION INTRAVENOUS at 21:43

## 2023-06-20 RX ADMIN — HEPARIN SODIUM 5000 UNITS: 5000 INJECTION INTRAVENOUS; SUBCUTANEOUS at 21:30

## 2023-06-20 RX ADMIN — INSULIN LISPRO 4 UNITS: 100 INJECTION, SOLUTION INTRAVENOUS; SUBCUTANEOUS at 08:06

## 2023-06-20 RX ADMIN — GUAIFENESIN 1200 MG: 600 TABLET, EXTENDED RELEASE ORAL at 17:00

## 2023-06-20 RX ADMIN — BUSPIRONE HYDROCHLORIDE 5 MG: 5 TABLET ORAL at 21:28

## 2023-06-20 RX ADMIN — BUSPIRONE HYDROCHLORIDE 5 MG: 5 TABLET ORAL at 10:00

## 2023-06-20 NOTE — ASSESSMENT & PLAN NOTE
Patient presents with acute hypoxic respiratory failure in setting of possible idiopathic pulmonary fibrosis  Patient admits to having increased shortness of breath with increased cough no sputum production    CT chest in May 2023 showed the following: Interstitial lung fibrosis  The distribution is somewhat atypical for UIP given lack of a clear apical basal gradient, and honeycombing in the anterior lung fields, and findings suggestive of air trapping  Admission chest x-ray official read pending but noted for diffuse parenchymal disease  Currently on 3 L oxygen  Noted to have diffuse crackles on exam which I suspect is secondary to pulmonary fibrosis    Status post ceftriaxone and azithromycin in the ER  Patient's only symptoms are shortness of breath and nonproductive cough  Very low suspicion for infectious etiology given patient has no other signs and symptoms of infection, is afebrile, no leukocytosis  Procalcitonin slightly elevated  Continue azithromycin x3 days  Status post DuoNeb and Lasix 40 IV in the ER  Will give Solu-Medrol 60 mg IV once and placed on prednisone 40 mg daily for 5 days  Placed on around-the-clock Xopenex, Atrovent, and Pulmicort nebulizer treatments  We will hold further Lasix for now as patient does not appear to be overtly volume overloaded and suspect the crackles are secondary to pulmonary fibrosis and not from pulmonary edema  Continue inhaler treatments, prednisone x5 days, titrate oxygen SPO2 > 90%, monitor leukocytosis which is improving today, most likely steroid driven, procalcitonin negative  Chronic 2 L nasal cannula at nursing facility  Patient at baseline respiratory status

## 2023-06-20 NOTE — ASSESSMENT & PLAN NOTE
Lab Results   Component Value Date    EGFR 44 06/20/2023    EGFR 44 06/19/2023    EGFR 42 01/03/2023    CREATININE 1 44 (H) 06/20/2023    CREATININE 1 45 (H) 06/19/2023    CREATININE 1 51 (H) 01/03/2023     Creatinine baseline around 1 4-1 6  Currently at baseline

## 2023-06-20 NOTE — PLAN OF CARE
Problem: RESPIRATORY - ADULT  Goal: Achieves optimal ventilation and oxygenation  Description: INTERVENTIONS:  - Assess for changes in respiratory status  - Assess for changes in mentation and behavior  - Position to facilitate oxygenation and minimize respiratory effort  - Oxygen administered by appropriate delivery if ordered  - Initiate smoking cessation education as indicated  - Encourage broncho-pulmonary hygiene including cough, deep breathe, Incentive Spirometry  - Assess the need for suctioning and aspirate as needed  - Assess and instruct to report SOB or any respiratory difficulty  - Respiratory Therapy support as indicated  Outcome: Progressing     Problem: SAFETY ADULT  Goal: Patient will remain free of falls  Description: INTERVENTIONS:  - Educate patient/family on patient safety including physical limitations  - Instruct patient to call for assistance with activity   - Consult OT/PT to assist with strengthening/mobility   - Keep Call bell within reach  - Keep bed low and locked with side rails adjusted as appropriate  - Keep care items and personal belongings within reach  - Initiate and maintain comfort rounds  - Make Fall Risk Sign visible to staff  - Offer Toileting every 2 Hours, in advance of need  - Initiate/Maintain bed alarm  - Obtain necessary fall risk management equipment: alarms  - Apply yellow socks and bracelet for high fall risk patients  - Consider moving patient to room near nurses station  Outcome: Progressing  Goal: Maintain or return to baseline ADL function  Description: INTERVENTIONS:  -  Assess patient's ability to carry out ADLs; assess patient's baseline for ADL function and identify physical deficits which impact ability to perform ADLs (bathing, care of mouth/teeth, toileting, grooming, dressing, etc )  - Assess/evaluate cause of self-care deficits   - Assess range of motion  - Assess patient's mobility; develop plan if impaired  - Assess patient's need for assistive devices and provide as appropriate  - Encourage maximum independence but intervene and supervise when necessary  - Involve family in performance of ADLs  - Assess for home care needs following discharge   - Consider OT consult to assist with ADL evaluation and planning for discharge  - Provide patient education as appropriate  Outcome: Progressing  Goal: Maintains/Returns to pre admission functional level  Description: INTERVENTIONS:  - Perform BMAT or MOVE assessment daily    - Set and communicate daily mobility goal to care team and patient/family/caregiver  - Collaborate with rehabilitation services on mobility goals if consulted  - Perform Range of Motion 3 times a day  - Reposition patient every 2 hours    - Dangle patient 3 times a day  - Stand patient 3 times a day  - Ambulate patient 3 times a day  - Out of bed to chair 3 times a day   - Out of bed for meals 3 times a day  - Out of bed for toileting  - Record patient progress and toleration of activity level   Outcome: Progressing     Problem: Prexisting or High Potential for Compromised Skin Integrity  Goal: Skin integrity is maintained or improved  Description: INTERVENTIONS:  - Identify patients at risk for skin breakdown  - Assess and monitor skin integrity  - Assess and monitor nutrition and hydration status  - Monitor labs   - Assess for incontinence   - Turn and reposition patient  - Assist with mobility/ambulation  - Relieve pressure over bony prominences  - Avoid friction and shearing  - Provide appropriate hygiene as needed including keeping skin clean and dry  - Evaluate need for skin moisturizer/barrier cream  - Collaborate with interdisciplinary team   - Patient/family teaching  - Consider wound care consult   Outcome: Progressing     Problem: MOBILITY - ADULT  Goal: Maintain or return to baseline ADL function  Description: INTERVENTIONS:  -  Assess patient's ability to carry out ADLs; assess patient's baseline for ADL function and identify physical deficits which impact ability to perform ADLs (bathing, care of mouth/teeth, toileting, grooming, dressing, etc )  - Assess/evaluate cause of self-care deficits   - Assess range of motion  - Assess patient's mobility; develop plan if impaired  - Assess patient's need for assistive devices and provide as appropriate  - Encourage maximum independence but intervene and supervise when necessary  - Involve family in performance of ADLs  - Assess for home care needs following discharge   - Consider OT consult to assist with ADL evaluation and planning for discharge  - Provide patient education as appropriate  Outcome: Progressing  Goal: Maintains/Returns to pre admission functional level  Description: INTERVENTIONS:  - Perform BMAT or MOVE assessment daily    - Set and communicate daily mobility goal to care team and patient/family/caregiver  - Collaborate with rehabilitation services on mobility goals if consulted  - Perform Range of Motion 3 times a day  - Reposition patient every 2 hours    - Dangle patient 3 times a day  - Stand patient 3 times a day  - Ambulate patient 3 times a day  - Out of bed to chair 3 times a day   - Out of bed for meals 3 times a day  - Out of bed for toileting  - Record patient progress and toleration of activity level   Outcome: Progressing

## 2023-06-20 NOTE — ASSESSMENT & PLAN NOTE
Lab Results   Component Value Date    HGBA1C 7 0 (H) 12/30/2022       Recent Labs     06/19/23 2042 06/20/23  0704   POCGLU 217* 291*       Blood Sugar Average: Last 72 hrs:  (P) 254   Carb consistent diet  Insulin sliding scale, Lantus nightly

## 2023-06-20 NOTE — RESPIRATORY THERAPY NOTE
RT Protocol Note  Jayda Duque 80 y o  male MRN: [id number]  Unit/Bed#: [address] [place] [address] Encounter: [id number]    Assessment    Active Problems:    CAD (coronary artery disease)    Type 2 diabetes mellitus, with long-term current use of insulin (HCC)    Orthostatic hypotension    Chronic idiopathic pulmonary fibrosis (HCC)    Benign prostatic hyperplasia without lower urinary tract symptoms    Stage 3b chronic kidney disease (HCC)    Paroxysmal A-fib (HCC)    Moderate late onset Alzheimer's dementia ([address] )      Home Pulmonary Medications:none       Past Medical History:   Diagnosis Date   • Coronary artery disease 2018    Severe MLAD Stenosis     Social History     Socioeconomic History   • Marital status: /Civil Union     Spouse name: None   • Number of children: None   • Years of education: None   • Highest education level: None   Occupational History   • None   Tobacco Use   • Smoking status: Unknown   • Smokeless tobacco: None   Vaping Use   • Vaping Use: Never used   Substance and Sexual Activity   • Alcohol use: Not Currently   • Drug use: Never   • Sexual activity: None   Other Topics Concern   • None   Social History Narrative   • None     Social Determinants of Health     Financial Resource Strain: Not on file   Food Insecurity: No Food Insecurity (12/31/2022)    Hunger Vital Sign    • Worried About Running Out of Food in the Last Year: Never true    • Ran Out of Food in the Last Year: Never true   Transportation Needs: No Transportation Needs (12/31/2022)    PRAPARE - Transportation    • Lack of Transportation (Medical): No    • Lack of Transportation (Non-Medical):  No   Physical Activity: Not on file   Stress: Not on file   Social Connections: Not on file   Intimate Partner Violence: Not on file   Housing Stability: Low Risk  (12/31/2022)    Housing Stability Vital Sign    • Unable to Pay for Housing in the Last Year: No    • Number of Places Lived in the Last Year: 1    • Unstable Housing in the Last Year: No       Subjective         Objective    Physical Exam:   Assessment Type: Pre-treatment  General Appearance: Awake, Alert  Respiratory Pattern: Normal  Chest Assessment: Chest expansion symmetrical  Bilateral Breath Sounds: Diminished  Cough: None  O2 Device: NC    Vitals:  Blood pressure 143/81, pulse 83, temperature 98 3 °F (36 8 °C), temperature source Oral, resp  rate 20, SpO2 97 %                O2 Device: NC     Plan    Respiratory Plan: Mild Distress pathway        Resp Comments: neb tx given

## 2023-06-20 NOTE — PROGRESS NOTES
INTERNAL MEDICINE PROGRESS NOTE     Name: Naomi Orellana   Age & Sex: 80 y o  male   MRN: [id number]  Unit/Bed#: [address] [place] [address]   Encounter: [id number]  Hospital Stay Days: 1      ASSESSMENT/PLAN     Active Problems:    CAD (coronary artery disease)    Type 2 diabetes mellitus, with long-term current use of insulin (HCC)    Orthostatic hypotension    Chronic idiopathic pulmonary fibrosis (HCC)    Benign prostatic hyperplasia without lower urinary tract symptoms    Stage 3b chronic kidney disease (HCC)    Paroxysmal A-fib (HCC)    Moderate late onset Alzheimer's dementia ([facility] )      Moderate late onset Alzheimer's dementia ([facility] [address] )  Assessment & Plan  On my evaluation in the ER patient was very confused  He was unable to tell me why he was in the ER and where he came from  Discussed with patient's wife who states that this is his baseline as his dementia has been progressing over the last 6 months  Discussed CODE STATUS with patient's wife who states that the patient would want to be DO NOT RESUSCITATE DO NOT INTUBATE  CODE STATUS changed to level 3 DNR/DNI    Paroxysmal A-fib [facility])  Assessment & Plan  After discussion with family, patient has been off Eliquis for the last 6 months given GI bleeding, will discontinue and start DVT prophylaxis     Toprol-XL 25 mg daily    Stage 3b chronic kidney disease [facility])  Assessment & Plan  Lab Results   Component Value Date    EGFR 44 06/20/2023    EGFR 44 06/19/2023    EGFR 42 01/03/2023    CREATININE 1 44 (H) 06/20/2023    CREATININE 1 45 (H) 06/19/2023    CREATININE 1 51 (H) 01/03/2023     Creatinine baseline around 1 4-1 6  Currently at baseline    Benign prostatic hyperplasia without lower urinary tract symptoms  Assessment & Plan  Continue Flomax    Chronic idiopathic pulmonary fibrosis [facility])  Assessment & Plan  Patient presents with acute hypoxic respiratory failure in setting of possible idiopathic pulmonary fibrosis  Patient admits to having increased shortness of breath with increased cough no sputum production    CT chest in May 2023 showed the following: Interstitial lung fibrosis  The distribution is somewhat atypical for UIP given lack of a clear apical basal gradient, and honeycombing in the anterior lung fields, and findings suggestive of air trapping  Admission chest x-ray official read pending but noted for diffuse parenchymal disease  Currently on 3 L oxygen  Noted to have diffuse crackles on exam which I suspect is secondary to pulmonary fibrosis    Status post ceftriaxone and azithromycin in the ER  Patient's only symptoms are shortness of breath and nonproductive cough  Very low suspicion for infectious etiology given patient has no other signs and symptoms of infection, is afebrile, no leukocytosis  Procalcitonin slightly elevated  Continue azithromycin x3 days  Status post DuoNeb and Lasix 40 IV in the ER  Will give Solu-Medrol 60 mg IV once and placed on prednisone 40 mg daily for 5 days  Placed on around-the-clock Xopenex, Atrovent, and Pulmicort nebulizer treatments  We will hold further Lasix for now as patient does not appear to be overtly volume overloaded and suspect the crackles are secondary to pulmonary fibrosis and not from pulmonary edema  6/20: Continue inhaler treatments, prednisone x5 days, titrate oxygen SPO2 > 90%, will discuss with case management for at home oxygen therapy      Orthostatic hypotension  Assessment & Plan  Continue midodrine 5 mg 3 times daily    Type 2 diabetes mellitus, with long-term current use of insulin [facility])  Assessment & Plan  Lab Results   Component Value Date    HGBA1C 7 0 (H) 12/30/2022       Recent Labs     06/19/23 2042 06/20/23  0704   POCGLU 217* 291*       Blood Sugar Average: Last 72 hrs:  (P) 254   Carb consistent diet  Insulin sliding scale    CAD (coronary artery disease)  Assessment & Plan  Continue ASA, Eliquis, Statin    Stress test 5/8: Left ventricular perfusion is abnormal   There is a partially reversible inferior lateral defect extending from base to mid area  EF is 62%  TID index quantitatively measured to be 1 5  Visually appears less cannot rule out balanced ischemia    Recommend outpatient cardiology        VTE Pharmacologic Prophylaxis:   Pharmacologic: Heparin  Mechanical VTE Prophylaxis in Place: Yes    Patient Centered Rounds: I have performed bedside rounds with nursing staff today  Discussions with Specialists or Other Care Team Provider: N/A    Education and Discussions with Family / Patient: Discussion with wife at bedside this morning  Time Spent for Care: 30 minutes  More than 50% of total time spent on counseling and coordination of care as described above  Current Length of Stay: 1 day(s)    Current Patient Status: Inpatient   Certification Statement: The patient will continue to require additional inpatient hospital stay due to COPD/pulmonary fibrosis exacerbation requiring oxygen therapy    Discharge Plan / Estimated Discharge Date: 24-48 hours    Code Status: Level 3 - DNAR and DNI    Subjective:     Patient is a 77-year-old male seen and examined at bedside this morning, notes that his breathing is better, some shortness of breath, currently on 3 L nasal cannula  Objective:   Vitals:   Temp (24hrs), Av °F (36 7 °C), Min:97 3 °F (36 3 °C), Max:98 5 °F (36 9 °C)    Temp:  [97 3 °F (36 3 °C)-98 5 °F (36 9 °C)] 97 3 °F (36 3 °C)  HR:  [71-91] 74  Resp:  [16-35] 18  BP: (120-151)/() 143/86  SpO2:  [89 %-100 %] 97 %  Body mass index is 24 71 kg/m²  Input and Output Summary (last 24 hours): Intake/Output Summary (Last 24 hours) at 2023 1207  Last data filed at 2023 2338  Gross per 24 hour   Intake 250 ml   Output 350 ml   Net -100 ml     Physical Exam:   Physical Exam  Constitutional:       General: He is not in acute distress  Appearance: He is ill-appearing  Eyes:      General: No scleral icterus       Conjunctiva/sclera: Conjunctivae normal    Cardiovascular:      Rate and Rhythm: Normal rate  Rhythm irregular  Pulses: Normal pulses  Heart sounds: No murmur heard  Pulmonary:      Effort: No respiratory distress  Breath sounds: Rhonchi present  No wheezing or rales  Abdominal:      General: Bowel sounds are normal  There is no distension  Tenderness: There is no abdominal tenderness  There is no guarding  Musculoskeletal:         General: No swelling  Normal range of motion  Skin:     General: Skin is warm and dry  Capillary Refill: Capillary refill takes less than 2 seconds  Neurological:      General: No focal deficit present  Mental Status: He is alert  Mental status is at baseline     Psychiatric:         Mood and Affect: Mood normal          Behavior: Behavior normal          Additional Data:   Basic Laboratory Review:   Results from last 7 days   Lab Units 06/20/23  0512 06/19/23  1558   SODIUM mmol/L 134* 133*   POTASSIUM mmol/L 4 6 4 3   CHLORIDE mmol/L 99 98   CO2 mmol/L 24 28   BUN mg/dL 34* 31*   CREATININE mg/dL 1 44* 1 45*   GLUCOSE RANDOM mg/dL 272* 270*   CALCIUM mg/dL 9 1 9 0   ALBUMIN g/dL  --  3 3*   ALK PHOS U/L  --  72   ALT U/L  --  8   AST U/L  --  10*   EGFR ml/min/1 73sq m 44 44       Results from last 7 days   Lab Units 06/20/23  0512 06/19/23  1558   WBC Thousand/uL 7 55 9 56   HEMOGLOBIN g/dL 11 1* 10 5*   HEMATOCRIT % 36 1* 34 8*   PLATELETS Thousands/uL 355 323   NEUTROS PCT %  --  78*   LYMPHS PCT %  --  9*   MONOS PCT %  --  9   EOS PCT %  --  4   BASOS PCT %  --  0   NEUTROS ABS Thousands/µL  --  7 45   LYMPHS ABS Thousands/µL  --  0 84   MONOS ABS Thousand/µL  --  0 84   EOS ABS Thousand/µL  --  0 37   BASOS ABS Thousands/µL  --  0 03         Results from last 7 days   Lab Units 06/19/23  1558   BNP pg/mL 50       Additional Labs:  Results from last 7 days   Lab Units 06/20/23  0512 06/19/23  1558   MAGNESIUM mg/dL 1 8*  1 8*  --    PHOSPHORUS mg/dL 4 7* 3 2 Recent Cultures (last 7 days):   Results from last 7 days   Lab Units 06/19/23  1746 06/19/23  1725   BLOOD CULTURE  Received in Microbiology Lab  Culture in Progress  Received in Microbiology Lab  Culture in Progress  * I Have Reviewed All Lab Data Listed Above  * Additional Pertinent Lab Tests Reviewed:  [address] [facility] Admission Reviewed    Imaging:  Prior imaging studies and reports reviewed    Last 24 Hours Medication List:   Current Facility-Administered Medications   Medication Dose Route Frequency Provider Last Rate   • acetaminophen  650 mg Oral Q6H PRN Nika Totram, DO     • aspirin  81 mg Oral Daily Cesari Totodde, DO     • azithromycin  500 mg Intravenous Q24H Jeremy Valles, DO     • budesonide  0 5 mg Nebulization Q12H Cesari Totodde, DO     • busPIRone  5 mg Oral BID Cesari Totodde, DO     • escitalopram  10 mg Oral HS Cesari Totodde, DO     • formoterol  20 mcg Nebulization Q12H Cesari Totodde, DO     • guaiFENesin  1,200 mg Oral BID Cesari Totram, DO     • heparin (porcine)  5,000 Units Subcutaneous Q8H [facility] Jeremy Valles, DO     • insulin glargine  20 Units Subcutaneous HS Cesari Totodde, DO     • insulin lispro  1-6 Units Subcutaneous TID AC Nika Totram, DO     • insulin lispro  1-6 Units Subcutaneous HS Cesari Totodde, DO     • ipratropium  0 5 mg Nebulization TID Cesari Totodde, DO     • levalbuterol  1 25 mg Nebulization TID Cesari Totodde, DO     • metoprolol succinate  25 mg Oral Daily Cesari Totodde, DO     • midodrine  5 mg Oral TID AC Nika Totram, DO     • predniSONE  40 mg Oral Daily Cesari Totodde, DO     • senna  1 tablet Oral Daily Cesari Totram, DO     • tamsulosin  0 4 mg Oral Daily With Dinner Maria Luisa Mota DO       Today, Patient Was Seen By: Dana Marquez DO

## 2023-06-20 NOTE — ASSESSMENT & PLAN NOTE
Continue ASA, Eliquis, Statin    Stress test 5/8: Left ventricular perfusion is abnormal   There is a partially reversible inferior lateral defect extending from base to mid area  EF is 62%  TID index quantitatively measured to be 1 5    Visually appears less cannot rule out balanced ischemia    Recommend outpatient cardiology

## 2023-06-20 NOTE — ASSESSMENT & PLAN NOTE
After discussion with family, patient has been off Eliquis for the last 6 months  Toprol-XL 25 mg daily, patient did have one event of A-fib with RVR, rate 130, controlled with Lopressor 5 mg IV    Recommend increasing Toprol-XL to 50 mg daily, telemetry    Lower dose Eliquis anticoagulation, benefits outweigh risks given paroxysmal A-fib  Had a long conversation with patient about taking this medication, watching for risk of fall

## 2023-06-21 LAB
ALBUMIN SERPL BCP-MCNC: 3.4 G/DL (ref 3.5–5)
ALP SERPL-CCNC: 66 U/L (ref 34–104)
ALT SERPL W P-5'-P-CCNC: 11 U/L (ref 7–52)
ANION GAP SERPL CALCULATED.3IONS-SCNC: 8 MMOL/L
AST SERPL W P-5'-P-CCNC: 10 U/L (ref 13–39)
BASOPHILS # BLD AUTO: 0.01 THOUSANDS/ÂΜL (ref 0–0.1)
BASOPHILS NFR BLD AUTO: 0 % (ref 0–1)
BILIRUB SERPL-MCNC: 0.41 MG/DL (ref 0.2–1)
BUN SERPL-MCNC: 42 MG/DL (ref 5–25)
CALCIUM ALBUM COR SERPL-MCNC: 9.8 MG/DL (ref 8.3–10.1)
CALCIUM SERPL-MCNC: 9.3 MG/DL (ref 8.4–10.2)
CHLORIDE SERPL-SCNC: 102 MMOL/L (ref 96–108)
CO2 SERPL-SCNC: 24 MMOL/L (ref 21–32)
CREAT SERPL-MCNC: 1.57 MG/DL (ref 0.6–1.3)
EOSINOPHIL # BLD AUTO: 0 THOUSAND/ÂΜL (ref 0–0.61)
EOSINOPHIL NFR BLD AUTO: 0 % (ref 0–6)
ERYTHROCYTE [DISTWIDTH] IN BLOOD BY AUTOMATED COUNT: 15.3 % (ref 11.6–15.1)
GFR SERPL CREATININE-BSD FRML MDRD: 40 ML/MIN/1.73SQ M
GLUCOSE SERPL-MCNC: 255 MG/DL (ref 65–140)
GLUCOSE SERPL-MCNC: 262 MG/DL (ref 65–140)
GLUCOSE SERPL-MCNC: 286 MG/DL (ref 65–140)
GLUCOSE SERPL-MCNC: 293 MG/DL (ref 65–140)
GLUCOSE SERPL-MCNC: 336 MG/DL (ref 65–140)
HCT VFR BLD AUTO: 32.9 % (ref 36.5–49.3)
HGB BLD-MCNC: 10.2 G/DL (ref 12–17)
IMM GRANULOCYTES # BLD AUTO: 0.11 THOUSAND/UL (ref 0–0.2)
IMM GRANULOCYTES NFR BLD AUTO: 1 % (ref 0–2)
LYMPHOCYTES # BLD AUTO: 0.77 THOUSANDS/ÂΜL (ref 0.6–4.47)
LYMPHOCYTES NFR BLD AUTO: 5 % (ref 14–44)
MAGNESIUM SERPL-MCNC: 1.9 MG/DL (ref 1.9–2.7)
MCH RBC QN AUTO: 25.1 PG (ref 26.8–34.3)
MCHC RBC AUTO-ENTMCNC: 31 G/DL (ref 31.4–37.4)
MCV RBC AUTO: 81 FL (ref 82–98)
MONOCYTES # BLD AUTO: 0.59 THOUSAND/ÂΜL (ref 0.17–1.22)
MONOCYTES NFR BLD AUTO: 4 % (ref 4–12)
NEUTROPHILS # BLD AUTO: 15.14 THOUSANDS/ÂΜL (ref 1.85–7.62)
NEUTS SEG NFR BLD AUTO: 90 % (ref 43–75)
NRBC BLD AUTO-RTO: 0 /100 WBCS
PLATELET # BLD AUTO: 385 THOUSANDS/UL (ref 149–390)
PMV BLD AUTO: 9.4 FL (ref 8.9–12.7)
POTASSIUM SERPL-SCNC: 4.7 MMOL/L (ref 3.5–5.3)
PROCALCITONIN SERPL-MCNC: 0.12 NG/ML
PROCALCITONIN SERPL-MCNC: 0.12 NG/ML
PROT SERPL-MCNC: 7 G/DL (ref 6.4–8.4)
RBC # BLD AUTO: 4.06 MILLION/UL (ref 3.88–5.62)
SODIUM SERPL-SCNC: 134 MMOL/L (ref 135–147)
WBC # BLD AUTO: 16.62 THOUSAND/UL (ref 4.31–10.16)

## 2023-06-21 PROCEDURE — 85025 COMPLETE CBC W/AUTO DIFF WBC: CPT | Performed by: STUDENT IN AN ORGANIZED HEALTH CARE EDUCATION/TRAINING PROGRAM

## 2023-06-21 PROCEDURE — 94760 N-INVAS EAR/PLS OXIMETRY 1: CPT

## 2023-06-21 PROCEDURE — 83735 ASSAY OF MAGNESIUM: CPT | Performed by: STUDENT IN AN ORGANIZED HEALTH CARE EDUCATION/TRAINING PROGRAM

## 2023-06-21 PROCEDURE — 99232 SBSQ HOSP IP/OBS MODERATE 35: CPT | Performed by: STUDENT IN AN ORGANIZED HEALTH CARE EDUCATION/TRAINING PROGRAM

## 2023-06-21 PROCEDURE — 82948 REAGENT STRIP/BLOOD GLUCOSE: CPT

## 2023-06-21 PROCEDURE — 80053 COMPREHEN METABOLIC PANEL: CPT | Performed by: STUDENT IN AN ORGANIZED HEALTH CARE EDUCATION/TRAINING PROGRAM

## 2023-06-21 PROCEDURE — 97167 OT EVAL HIGH COMPLEX 60 MIN: CPT

## 2023-06-21 PROCEDURE — 94640 AIRWAY INHALATION TREATMENT: CPT

## 2023-06-21 PROCEDURE — 84145 PROCALCITONIN (PCT): CPT | Performed by: STUDENT IN AN ORGANIZED HEALTH CARE EDUCATION/TRAINING PROGRAM

## 2023-06-21 PROCEDURE — 97163 PT EVAL HIGH COMPLEX 45 MIN: CPT

## 2023-06-21 PROCEDURE — 87040 BLOOD CULTURE FOR BACTERIA: CPT | Performed by: STUDENT IN AN ORGANIZED HEALTH CARE EDUCATION/TRAINING PROGRAM

## 2023-06-21 PROCEDURE — 97530 THERAPEUTIC ACTIVITIES: CPT

## 2023-06-21 RX ADMIN — ACETAMINOPHEN 650 MG: 325 TABLET ORAL at 14:29

## 2023-06-21 RX ADMIN — BUDESONIDE 0.5 MG: 0.5 INHALANT ORAL at 21:01

## 2023-06-21 RX ADMIN — INSULIN LISPRO 6 UNITS: 100 INJECTION, SOLUTION INTRAVENOUS; SUBCUTANEOUS at 11:42

## 2023-06-21 RX ADMIN — IPRATROPIUM BROMIDE 0.5 MG: 0.5 SOLUTION RESPIRATORY (INHALATION) at 13:50

## 2023-06-21 RX ADMIN — BUSPIRONE HYDROCHLORIDE 5 MG: 5 TABLET ORAL at 08:09

## 2023-06-21 RX ADMIN — INSULIN GLARGINE 30 UNITS: 100 INJECTION, SOLUTION SUBCUTANEOUS at 22:20

## 2023-06-21 RX ADMIN — HEPARIN SODIUM 5000 UNITS: 5000 INJECTION INTRAVENOUS; SUBCUTANEOUS at 13:51

## 2023-06-21 RX ADMIN — MIDODRINE HYDROCHLORIDE 5 MG: 5 TABLET ORAL at 16:54

## 2023-06-21 RX ADMIN — TAMSULOSIN HYDROCHLORIDE 0.4 MG: 0.4 CAPSULE ORAL at 16:54

## 2023-06-21 RX ADMIN — FORMOTEROL FUMARATE DIHYDRATE 20 MCG: 20 SOLUTION RESPIRATORY (INHALATION) at 21:01

## 2023-06-21 RX ADMIN — INSULIN LISPRO 6 UNITS: 100 INJECTION, SOLUTION INTRAVENOUS; SUBCUTANEOUS at 16:43

## 2023-06-21 RX ADMIN — GUAIFENESIN 1200 MG: 600 TABLET, EXTENDED RELEASE ORAL at 08:08

## 2023-06-21 RX ADMIN — SENNOSIDES 8.6 MG: 8.6 TABLET, FILM COATED ORAL at 08:11

## 2023-06-21 RX ADMIN — BUSPIRONE HYDROCHLORIDE 5 MG: 5 TABLET ORAL at 22:50

## 2023-06-21 RX ADMIN — LEVALBUTEROL HYDROCHLORIDE 1.25 MG: 1.25 SOLUTION RESPIRATORY (INHALATION) at 13:50

## 2023-06-21 RX ADMIN — IPRATROPIUM BROMIDE 0.5 MG: 0.5 SOLUTION RESPIRATORY (INHALATION) at 21:01

## 2023-06-21 RX ADMIN — PREDNISONE 40 MG: 20 TABLET ORAL at 08:10

## 2023-06-21 RX ADMIN — ASPIRIN 81 MG CHEWABLE TABLET 81 MG: 81 TABLET CHEWABLE at 08:09

## 2023-06-21 RX ADMIN — IPRATROPIUM BROMIDE 0.5 MG: 0.5 SOLUTION RESPIRATORY (INHALATION) at 07:08

## 2023-06-21 RX ADMIN — ESCITALOPRAM OXALATE 10 MG: 10 TABLET ORAL at 22:50

## 2023-06-21 RX ADMIN — METOPROLOL SUCCINATE 25 MG: 25 TABLET, EXTENDED RELEASE ORAL at 08:10

## 2023-06-21 RX ADMIN — GUAIFENESIN 1200 MG: 600 TABLET, EXTENDED RELEASE ORAL at 17:02

## 2023-06-21 RX ADMIN — BUDESONIDE 0.5 MG: 0.5 INHALANT ORAL at 07:08

## 2023-06-21 RX ADMIN — FORMOTEROL FUMARATE DIHYDRATE 20 MCG: 20 SOLUTION RESPIRATORY (INHALATION) at 07:08

## 2023-06-21 RX ADMIN — AZITHROMYCIN MONOHYDRATE 500 MG: 500 INJECTION, POWDER, LYOPHILIZED, FOR SOLUTION INTRAVENOUS at 13:13

## 2023-06-21 RX ADMIN — INSULIN LISPRO 8 UNITS: 100 INJECTION, SOLUTION INTRAVENOUS; SUBCUTANEOUS at 22:20

## 2023-06-21 RX ADMIN — HEPARIN SODIUM 5000 UNITS: 5000 INJECTION INTRAVENOUS; SUBCUTANEOUS at 22:50

## 2023-06-21 RX ADMIN — MIDODRINE HYDROCHLORIDE 5 MG: 5 TABLET ORAL at 06:17

## 2023-06-21 RX ADMIN — HEPARIN SODIUM 5000 UNITS: 5000 INJECTION INTRAVENOUS; SUBCUTANEOUS at 05:55

## 2023-06-21 RX ADMIN — INSULIN LISPRO 3 UNITS: 100 INJECTION, SOLUTION INTRAVENOUS; SUBCUTANEOUS at 08:06

## 2023-06-21 RX ADMIN — LEVALBUTEROL HYDROCHLORIDE 1.25 MG: 1.25 SOLUTION RESPIRATORY (INHALATION) at 07:08

## 2023-06-21 RX ADMIN — LEVALBUTEROL HYDROCHLORIDE 1.25 MG: 1.25 SOLUTION RESPIRATORY (INHALATION) at 21:01

## 2023-06-21 RX ADMIN — MIDODRINE HYDROCHLORIDE 5 MG: 5 TABLET ORAL at 11:42

## 2023-06-21 NOTE — CASE MANAGEMENT
Case Management Assessment & Discharge Planning Note    Patient name Boris Camara  Location 18 [facility] [address] MRN [id number]  : 1940 Date 2023       Current Admission Date: 2023  Current Admission Diagnosis:Acute respiratory failure with hypoxia [facility])   Patient Active Problem List    Diagnosis Date Noted   • Acute respiratory failure with hypoxia ([facility] [address] [facility] ) 2023   • Paroxysmal A-fib ([facility] ) 2023   • Moderate late onset Alzheimer's dementia ([facility] ) 2023   • Orthostatic hypotension 2023   • Postural dizziness with near syncope 2023   • Type 2 diabetes mellitus with hyperosmolarity without coma, with long-term current use of insulin ([facility] ) 2023   • Chronic idiopathic pulmonary fibrosis ([facility] ) 2023   • Benign prostatic hyperplasia without lower urinary tract symptoms 2023   • Stage 3b chronic kidney disease ([facility] ) 2023   • Vitamin D insufficiency 2023   • Cellulitis of left ankle    • Pain of left foot    • Inflammatory arthritis    • Cellulitis 2022   • CAD (coronary artery disease) 2022   • CKD (chronic kidney disease) 2022   • BPH (benign prostatic hyperplasia) 2022   • Dyslipidemia 2022   • Chronic lung disease 2022   • Type 2 diabetes mellitus, with long-term current use of insulin ([facility] ) 2022   • Essential hypertension 2022      LOS (days): 2  Geometric Mean LOS (GMLOS) (days): 5 00  Days to GMLOS:3 2     OBJECTIVE:    Risk of Unplanned Readmission Score: 20 33         Current admission status: Inpatient     Preferred Pharmacy:   [facility] [address] [place] [id number]  [id number]  Phone: [phone] Fax: [phone]    Primary Care Provider: Lis Singletary    Primary Insurance: MEDICARE  Secondary Insurance: [facility]    ASSESSMENT:  Araceli 26 Proxies    There are no active Health Care Proxies on file         Readmission Root Cause  30 Day Readmission: No    Patient Information  Admitted from[de-identified] Facility (STR at [email or web address] [email or web address])  Mental Status: Alert (Dementia)  During Assessment patient was accompanied by: Not accompanied during assessment  Assessment information provided by[de-identified] Spouse  Primary Caregiver: Spouse  Caregiver's Name[de-identified] Hardeep King (wife)  Caregiver's Relationship to Patient[de-identified] Family Member  Caregiver's Telephone Number[de-identified] [phone]  Support Systems: Spouse/significant other  [place] of Residence: [address] [place] [address] do you live in?: Mobile entry access options   Select all that apply : Stairs  Number of steps to enter home : 3  Type of Current Residence: [place]  In the last 12 months, was there a time when you were not able to pay the mortgage or rent on time?: No  In the last 12 months, how many places have you lived?: 1  In the last 12 months, was there a time when you did not have a steady place to sleep or slept in a shelter (including now)?: No  Homeless/housing insecurity resource given?: N/A  Living Arrangements: Lives w/ Spouse/significant other    Activities of Daily Living Prior to Admission  Functional Status: Independent  Completes ADLs independently?: Yes  Ambulates independently?: Yes  Does patient use assisted devices?: Yes  Assisted Devices (DME) used: Straight Cane  Does patient currently own DME?: Yes  What DME does the patient currently own?: Straight Cane  Does patient have a history of Outpatient Therapy (PT/OT)?: No  Does the patient have a history of Short-Term Rehab?: Yes ([address] at [facility])  Does patient have a history of [facility]?: No  Does patient currently have [facility]?: No     Patient Information Continued  Income Source: Pension/shelter  Does patient have prescription coverage?: Yes  Within the past 12 months, you worried that your food would run out before you got the money to buy more : Never true  Within the past 12 months, the food you bought just didn't last and you didn't have money to get more : Never true  Food insecurity resource given?: N/A  Does patient receive dialysis treatments?: No     Means of Transportation  Means of Transport to Appts[de-identified] Family transport  In the past 12 months, has lack of transportation kept you from medical appointments or from getting medications?: No  In the past 12 months, has lack of transportation kept you from meetings, work, or from getting things needed for daily living?: No  Was application for public transport provided?: N/A    DISCHARGE DETAILS:    Discharge planning discussed with[de-identified] Akash Villanueva (wife)  Freedom of Choice: Yes  Comments - Freedom of Choice: STR preference- return to [address] [place] [address] contacted family/caregiver?: Yes  Were Treatment Team discharge recommendations reviewed with patient/caregiver?: Yes  Did patient/caregiver verbalize understanding of patient care needs?: N/A- going to facility  Were patient/caregiver advised of the risks associated with not following Treatment Team discharge recommendations?: Yes    Contacts  Patient Contacts: Yaritza Miller( wife)  Relationship to Patient[de-identified] Family  Contact Method: Phone  Phone Number: [phone]  Reason/Outcome: Discharge Planning, Emergency [address]         Is the patient interested in [facility] at discharge?: No    DME Referral Provided  Referral made for DME?: No    Other Referral/Resources/Interventions Provided:  Interventions: Short Term Rehab  Referral Comments: CM called and spoke with patient's wife introduced self, role, complete assessment and discharge planning  Discussed discharge planning and STR per therapy recs  Akash Villanueva agreeable for the same and voiced prefence for patient to return to [facility] if possible  CM sent referral to [facility], awaiting response       Treatment Team Recommendation: Short Term Rehab  Discharge Destination Plan[de-identified] Short Term Rehab  Transport at Discharge : BLS Ambulance, Wheelchair Sergei Jack

## 2023-06-21 NOTE — OCCUPATIONAL THERAPY NOTE
OT EVALUATION       06/21/23 1025   Note Type   Note type Evaluation   Pain Assessment   Pain Assessment Tool Barton-Baker FACES   Barton-Baker FACES Pain Rating 4   Pain Location/Orientation Location: Head   Restrictions/Precautions   Other Precautions Cognitive; Bed Alarm; Chair Alarm; Fall Risk;Pain   Home Living   Type of Home House   Bathroom Shower/Tub Tub/shower unit   Additional Comments pt is a poor historian with history of dementia   Prior Function   Level of [place] Needs assistance with IADLS; Independent with ADLs; Independent with functional mobility   Lives With Spouse   Receives Help From Family   ADL   Eating Assistance 5  Supervision/Setup   Grooming Assistance 4  Minimal Assistance   UB Bathing Assistance 3  Moderate Assistance   LB Bathing Assistance 3  Moderate Assistance   UB Dressing Assistance 3  Moderate Assistance   LB Dressing Assistance 2  Maximal Assistance   Toileting Assistance  3  Moderate Assistance   Bed Mobility   Supine to Sit 3  Moderate assistance   Additional items Assist x 1;Verbal cues   Sit to Supine 3  Moderate assistance   Additional items Assist x 1;Verbal cues   Transfers   Sit to Stand 3  Moderate assistance   Additional items Assist x 1;Verbal cues   Stand to Sit 3  Moderate assistance   Additional items Assist x 1;Verbal cues   Functional Mobility   Functional Mobility 3  Moderate assistance   Additional Comments few steps bed to chair with hand hold assist, unsteady with poor safety awareness   Balance   Static Sitting Fair   Dynamic Sitting Fair -   Static Standing Poor +   Dynamic Standing Poor   Activity Tolerance   Activity Tolerance Patient limited by fatigue;Patient limited by pain  (headache, cognitive impairment)   Nurse Made Aware yes, Consuelo   RUE Assessment   RUE Assessment WFL   LUE Assessment   LUE Assessment WFL   Cognition   Overall Cognitive Status Impaired   Arousal/Participation Cooperative   Attention Attends with cues to redirect   Orientation Level Oriented to person;Disoriented to place; Disoriented to time;Disoriented to situation   Following Commands Follows one step commands with increased time or repetition   Cognition Assessment Tools Other (Comment)   Score 25  (Short Blessed Test administered with a score of 25/28 indicating impairment consistent with dementia)   Assessment   Limitation Decreased ADL status; Decreased UE strength;Decreased Safe judgement during ADL;Decreased endurance;Decreased self-care trans;Decreased high-level ADLs; Decreased cognition  (decreased balance and mobility)   Prognosis Good   Assessment Patient evaluated by Occupational Therapy  Patient admitted with Acute respiratory failure with hypoxia ([facility] )  The patients occupational profile, medical and therapy history includes a extensive additional review of physical, cognitive, or psychosocial history related to current functional performance  Comorbidities affecting functional mobility and ADLS include: CAD  Prior to admission, patient was independent with ADLS and requiring assist for IADLS  The evaluation identifies the following performance deficits: weakness, impaired balance, decreased endurance, increased fall risk, new onset of impairment of functional mobility, decreased ADLS, decreased IADLS, pain, decreased activity tolerance, decreased safety awareness, impaired judgement, decreased cognition and decreased strength, that result in activity limitations and/or participation restrictions  This evaluation requires clinical decision making of high complexity, because the patient presents with comorbidites that affect occupational performance and required significant modification of tasks or assistance with consideration of multiple treatment options  The Barthel Index was used as a functional outcome tool presenting with a score of Barthel Index Score: 30, indicating marked limitations of functional mobility and ADLS    The patient's raw score on the AM-PAC Daily Activity Inpatient Short Form is 14  A raw score of less than 19 suggests the patient may benefit from discharge to post-acute rehabilitation services  Please refer to the recommendation of the Occupational Therapist for safe discharge planning  Patient will benefit from skilled Occupational Therapy services to address above deficits and facilitate a safe return to prior level of function  Goals   Patient Goals unable to state due to cognitive impairment   STG Time Frame   (1-7 days)   Short Term Goal  Goals established to promote Patient Goals: unable to state due to cognitive impairment:  Eating: supervision; Grooming: supervision seated; Bathing: min assist; Upper Body Dressing min assist; Lower Body Dressing: mod assist; Toileting: min assist; Patient will increase stand pivot commode transfer to min assist with rolling walker to increase performance and safety with ADLS and functional mobility; Patient will increase standing tolerance to 3 minutes during ADL task to decrease assistance level and decrease fall risk; Patient will increase bed mobility to min assist in preparation for ADLS and transfers; Patient will increase functional mobility to and from bathroom with rolling walker with mod assist to increase performance with ADLS and to use a toilet; Patient will tolerate 5 minutes of UE ROM/strengthening to increase general activity tolerance and performance in ADLS/IADLS; Patient will improve functional activity tolerance to 10 minutes of sustained functional tasks to increase participation in basic self-care and decrease assistance level;   Patient will increase dynamic sitting balance to fair to improve the ability to sit at edge of bed or on a chair for ADLS;  Patient will increase dynamic standing balance to poor+ to improve postural stability and decrease fall risk during standing ADLS and transfers     LTG Time Frame   (8-14 days)   Long Term Goal Eating: independent; Grooming: independent seated; Bathing: supervision; Upper Body Dressing supervision; Lower Body Dressing: min assist; Toileting: supervision; Patient will increase stand pivot commode transfer to supervision with rolling walker to increase performance and safety with ADLS and functional mobility; Patient will increase standing tolerance to 6 minutes during ADL task to decrease assistance level and decrease fall risk; Patient will increase bed mobility to supervision in preparation for ADLS and transfers; Patient will increase functional mobility to and from bathroom with rolling walker with min assist to increase performance with ADLS and to use a toilet; Patient will tolerate 10 minutes of UE ROM/strengthening to increase general activity tolerance and performance in ADLS/IADLS; Patient will improve functional activity tolerance to 20 minutes of sustained functional tasks to increase participation in basic self-care and decrease assistance level;   Patient will increase dynamic sitting balance to fair+ to improve the ability to sit at edge of bed or on a chair for ADLS;  Patient will increase dynamic standing balance to fair- to improve postural stability and decrease fall risk during standing ADLS and transfers  Pt will score >/= 18/24 on -PAC Daily Activity Inpatient scale to promote safe independence with ADLs and functional mobility; Pt will score >/= 60/100 on Barthel Index in order to decrease caregiver assistance needed and increase ability to perform ADLs and functional mobility  Plan   Treatment Interventions ADL retraining;Functional transfer training;UE strengthening/ROM; Endurance training;Patient/family training;Equipment evaluation/education; Activityengagement; Compensatory technique education   Goal Expiration Date 07/05/23   OT Frequency 3-5x/wk   Recommendation   OT Discharge Recommendation Post acute rehabilitation services   [facility] Daily Activity Inpatient   Lower Body Dressing 1   Bathing 2   Toileting 2   Upper Body Dressing 3   Grooming 3   Eating 3   Daily Activity Raw Score 14   Daily Activity Standardized Score (Calc for Raw Score >=11) 33 39   AM-PAC Applied Cognition Inpatient   Following a Speech/Presentation 2   Understanding Ordinary Conversation 3   Taking Medications 1   Remembering Where Things Are Placed or Put Away 1   Remembering List of 4-5 Errands 1   Taking Care of Complicated Tasks 1   Applied Cognition Raw Score 9   Applied Cognition Standardized Score 22 48   Barthel Index   Feeding 5   Bathing 0   Grooming Score 0   Dressing Score 5   Bladder Score 0   Bowels Score 10   Toilet Use Score 5   Transfers (Bed/Chair) Score 5   Mobility (Level Surface) Score 0   Stairs Score 0   Barthel Index Score 30   Licensure   [place] License Number  Lynnda Spurling [address] OTR/L [id number]

## 2023-06-21 NOTE — PROGRESS NOTES
INTERNAL MEDICINE PROGRESS NOTE     Name: Khalida Howell   Age & Sex: 80 y o  male   MRN: [id number]  Unit/Bed#: [address]   Encounter: [id number]  Hospital Stay Days: 2      ASSESSMENT/PLAN     Principal Problem:    Acute respiratory failure with hypoxia ([address] )  Active Problems:    CAD (coronary artery disease)    Type 2 diabetes mellitus, with long-term current use of insulin (HCC)    Orthostatic hypotension    Chronic idiopathic pulmonary fibrosis (HCC)    Benign prostatic hyperplasia without lower urinary tract symptoms    Stage 3b chronic kidney disease (HCC)    Paroxysmal A-fib (HCC)    Moderate late onset Alzheimer's dementia ([address] )      Moderate late onset Alzheimer's dementia ([facility] )  Assessment & Plan  On my evaluation in the ER patient was very confused  He was unable to tell me why he was in the ER and where he came from  Discussed with patient's wife who states that this is his baseline as his dementia has been progressing over the last 6 months  Discussed CODE STATUS with patient's wife who states that the patient would want to be DO NOT RESUSCITATE DO NOT INTUBATE  CODE STATUS changed to level 3 DNR/DNI    Paroxysmal A-fib [facility])  Assessment & Plan  After discussion with family, patient has been off Eliquis for the last 6 months given GI bleeding, will discontinue and start DVT prophylaxis     Toprol-XL 25 mg daily    Stage 3b chronic kidney disease [facility])  Assessment & Plan  Lab Results   Component Value Date    EGFR 44 06/20/2023    EGFR 44 06/19/2023    EGFR 42 01/03/2023    CREATININE 1 44 (H) 06/20/2023    CREATININE 1 45 (H) 06/19/2023    CREATININE 1 51 (H) 01/03/2023     Creatinine baseline around 1 4-1 6  Currently at baseline    Benign prostatic hyperplasia without lower urinary tract symptoms  Assessment & Plan  Continue Flomax    Chronic idiopathic pulmonary fibrosis [facility])  Assessment & Plan  Patient presents with acute hypoxic respiratory failure in setting of possible idiopathic pulmonary fibrosis  Patient admits to having increased shortness of breath with increased cough no sputum production    CT chest in May 2023 showed the following: Interstitial lung fibrosis  The distribution is somewhat atypical for UIP given lack of a clear apical basal gradient, and honeycombing in the anterior lung fields, and findings suggestive of air trapping  Admission chest x-ray official read pending but noted for diffuse parenchymal disease  Currently on 3 L oxygen  Noted to have diffuse crackles on exam which I suspect is secondary to pulmonary fibrosis    Status post ceftriaxone and azithromycin in the ER  Patient's only symptoms are shortness of breath and nonproductive cough  Very low suspicion for infectious etiology given patient has no other signs and symptoms of infection, is afebrile, no leukocytosis  Procalcitonin slightly elevated  Continue azithromycin x3 days  Status post DuoNeb and Lasix 40 IV in the ER  Will give Solu-Medrol 60 mg IV once and placed on prednisone 40 mg daily for 5 days  Placed on around-the-clock Xopenex, Atrovent, and Pulmicort nebulizer treatments  We will hold further Lasix for now as patient does not appear to be overtly volume overloaded and suspect the crackles are secondary to pulmonary fibrosis and not from pulmonary edema  Continue inhaler treatments, prednisone x5 days, titrate oxygen SPO2 > 90%, monitor leukocytosis, most likely steroid driven, procalcitonin negative      Orthostatic hypotension  Assessment & Plan  Continue midodrine 5 mg 3 times daily    Type 2 diabetes mellitus, with long-term current use of insulin [facility])  Assessment & Plan  Lab Results   Component Value Date    HGBA1C 7 0 (H) 12/30/2022       Recent Labs     06/19/23 2042 06/20/23  0704   POCGLU 217* 291*       Blood Sugar Average: Last 72 hrs:  (P) 254   Carb consistent diet  Insulin sliding scale, Lantus nightly    CAD (coronary artery disease)  Assessment & Plan  Continue ASA, Eliquis, Statin    Stress test : Left ventricular perfusion is abnormal   There is a partially reversible inferior lateral defect extending from base to mid area  EF is 62%  TID index quantitatively measured to be 1 5  Visually appears less cannot rule out balanced ischemia    Recommend outpatient cardiology      * Acute respiratory failure with hypoxia ([address] )  Assessment & Plan  Continue to titrate oxygen for SPO2 greater than 90%  Monitor off antibiotics, leukocytosis appears to be driven by steroids, procalcitonin negative  Known history of pulmonary fibrosis, follow-up pulmonology        VTE Pharmacologic Prophylaxis:   Pharmacologic: Heparin  Mechanical VTE Prophylaxis in Place: Yes    Patient Centered Rounds: I have performed bedside rounds with nursing staff today  Discussions with Specialists or Other Care Team Provider: N/A    Education and Discussions with Family / Patient: Discussed case with wife this afternoon  Time Spent for Care: 30 minutes  More than 50% of total time spent on counseling and coordination of care as described above  Current Length of Stay: 2 day(s)    Current Patient Status: Inpatient   Certification Statement: The patient will continue to require additional inpatient hospital stay due to Acute hypoxic respiratory failure    Discharge Plan / Estimated Discharge Date: 24-48 hours    Code Status: Level 3 - DNAR and DNI    Subjective:     Patient seen and examined at bedside this morning, no acute concerns, eating breakfast at time of encounter  Objective:   Vitals:   Temp (24hrs), Av 9 °F (36 6 °C), Min:97 6 °F (36 4 °C), Max:98 2 °F (36 8 °C)    Temp:  [97 6 °F (36 4 °C)-98 2 °F (36 8 °C)] 97 6 °F (36 4 °C)  HR:  [] 72  Resp:  [16-20] 20  BP: (124-153)/(72-90) 153/87  SpO2:  [91 %-98 %] 94 %  Body mass index is 21 7 kg/m²  Input and Output Summary (last 24 hours):      Intake/Output Summary (Last 24 hours) at 2023 1326  Last data filed at 6/21/2023 0601  Gross per 24 hour   Intake 50 ml   Output 525 ml   Net -475 ml     Physical Exam:   Physical Exam  Constitutional:       General: He is not in acute distress  HENT:      Head: Normocephalic and atraumatic  Eyes:      General: No scleral icterus  Conjunctiva/sclera: Conjunctivae normal    Cardiovascular:      Rate and Rhythm: Normal rate and regular rhythm  Pulses: Normal pulses  Heart sounds: No murmur heard  Pulmonary:      Effort: Pulmonary effort is normal  No respiratory distress  Breath sounds: Rhonchi present  Abdominal:      General: Bowel sounds are normal  There is no distension  Palpations: Abdomen is soft  Tenderness: There is no abdominal tenderness  There is no guarding  Musculoskeletal:         General: No swelling  Normal range of motion  Skin:     General: Skin is warm and dry  Capillary Refill: Capillary refill takes less than 2 seconds  Coloration: Skin is not jaundiced  Neurological:      General: No focal deficit present  Mental Status: He is alert and oriented to person, place, and time  Mental status is at baseline     Psychiatric:         Mood and Affect: Mood normal          Behavior: Behavior normal            Additional Data:   Basic Laboratory Review:   Results from last 7 days   Lab Units 06/21/23  0531 06/20/23  0512 06/19/23  1558   SODIUM mmol/L 134* 134* 133*   POTASSIUM mmol/L 4 7 4 6 4 3   CHLORIDE mmol/L 102 99 98   CO2 mmol/L 24 24 28   BUN mg/dL 42* 34* 31*   CREATININE mg/dL 1 57* 1 44* 1 45*   GLUCOSE RANDOM mg/dL 286* 272* 270*   CALCIUM mg/dL 9 3 9 1 9 0   ALBUMIN g/dL 3 4*  --  3 3*   ALK PHOS U/L 66  --  72   ALT U/L 11  --  8   AST U/L 10*  --  10*   EGFR ml/min/1 73sq m 40 44 44       Results from last 7 days   Lab Units 06/21/23  0531 06/20/23  0512 06/19/23  1558   WBC Thousand/uL 16 62* 7 55 9 56   HEMOGLOBIN g/dL 10 2* 11 1* 10 5*   HEMATOCRIT % 32 9* 36 1* 34 8*   PLATELETS Thousands/uL 385 355 323   NEUTROS PCT % 90*  --  78*   LYMPHS PCT % 5*  --  9*   MONOS PCT % 4  --  9   EOS PCT % 0  --  4   BASOS PCT % 0  --  0   NEUTROS ABS Thousands/µL 15 14*  --  7 45   LYMPHS ABS Thousands/µL 0 77  --  0 84   MONOS ABS Thousand/µL 0 59  --  0 84   EOS ABS Thousand/µL 0 00  --  0 37   BASOS ABS Thousands/µL 0 01  --  0 03         Results from last 7 days   Lab Units 06/19/23  1558   BNP pg/mL 50       Additional Labs:  Results from last 7 days   Lab Units 06/21/23  0531 06/20/23  0512 06/19/23  1558   MAGNESIUM mg/dL 1 9 1 8*  1 8*  --    PHOSPHORUS mg/dL  --  4 7* 3 2                Recent Cultures (last 7 days):   Results from last 7 days   Lab Units 06/19/23  1746 06/19/23  1725   BLOOD CULTURE  No Growth at 24 hrs   --    GRAM STAIN RESULT   --  Gram positive cocci in clusters*       * I Have Reviewed All Lab Data Listed Above  * Additional Pertinent Lab Tests Reviewed:  [address] Admission Reviewed    Imaging:  Prior imaging studies and reports reviewed    Last 24 Hours Medication List:   Current Facility-Administered Medications   Medication Dose Route Frequency Provider Last Rate   • acetaminophen  650 mg Oral Q6H PRN Cesari Totodde, DO     • aspirin  81 mg Oral Daily Cesari Totodde, DO     • azithromycin  500 mg Intravenous Q24H Starr Alonso  mg (06/21/23 1313)   • benzonatate  100 mg Oral TID PRN Maite Valles DO     • budesonide  0 5 mg Nebulization Q12H Cesari Totodde, DO     • busPIRone  5 mg Oral BID Cesari Totodde, DO     • escitalopram  10 mg Oral HS Cesari Totodde, DO     • formoterol  20 mcg Nebulization Q12H Cesari Totodde, DO     • guaiFENesin  1,200 mg Oral BID Cesari Totodde, DO     • heparin (porcine)  5,000 Units Subcutaneous [facility] Jeremy Valles, DO     • insulin glargine  30 Units Subcutaneous HS Jeremy Vlales DO     • insulin lispro  2-12 Units Subcutaneous TID AC Jeremy Valles DO     • insulin lispro  2-12 Units Subcutaneous CINTHYA Valles, DO     • ipratropium  0 5 mg Nebulization TID Nika Barton DO     • levalbuterol  1 25 mg Nebulization TID Nika Barton DO     • metoprolol succinate  25 mg Oral Daily Nika Barton DO     • midodrine  5 mg Oral TID AC Nika Barton DO     • predniSONE  40 mg Oral Daily Nika Barton DO     • senna  1 tablet Oral Daily Nika Barton DO     • tamsulosin  0 4 mg Oral Daily With Dinner Lalitha Stanley DO       Today, Patient Was Seen By: Malgorzata Cruz DO

## 2023-06-21 NOTE — ASSESSMENT & PLAN NOTE
Continue to titrate oxygen for SPO2 greater than 90%  Monitor off antibiotics, leukocytosis appears to be driven by steroids, procalcitonin negative  Known history of pulmonary fibrosis, follow-up pulmonology    Repeat chest x-ray, stable interstitial disease, no infiltrate

## 2023-06-21 NOTE — PHYSICAL THERAPY NOTE
"       PHYSICAL THERAPY EVALUATION/TREATMENT     06/21/23 1010   PT Last Visit   PT Visit Date 06/21/23   Note Type   Note type Evaluation   Pain Assessment   Pain Assessment Tool 0-10   Pain Score 5   Pain Location/Orientation Location: Head   Restrictions/Precautions   Weight Bearing Precautions Per Order No   Home Living   Type of [address] One level  (2 ISIS)   Home Equipment Cane   Prior Function   Level of [place] Needs assistance with ADLs; Needs assistance with functional mobility; Needs assistance with IADLS   Lives With Spouse   [facility]   Vocational Retired   General   Additional Pertinent History Pt is an 80year old male admitted with acute respiratory failure  Pt has Alzheimer's dementia   Family/Caregiver Present No   Cognition   Overall Cognitive Status Impaired   Arousal/Participation Cooperative   Orientation Level Oriented to person   Following Commands Follows one step commands with increased time or repetition   Subjective   Subjective \"My wife put me here\" when asked why he was in the hospital   RLE Assessment   RLE Assessment WFL  (strength grossly 3+/5)   LLE Assessment   LLE Assessment WFL  (strength grossly 3+/5)   Bed Mobility   Supine to Sit 4  Minimal assistance   Additional items Assist x 1;Verbal cues   Sit to Supine 4  Minimal assistance   Additional items Assist x 1;Verbal cues;LE management   Transfers   Sit to Stand 3  Moderate assistance   Additional items Assist x 1;Verbal cues   Stand to Sit 3  Moderate assistance   Additional items Assist x 1;Verbal cues   Stand pivot 3  Moderate assistance   Additional items Assist x 1;Verbal cues   Additional Comments Pt is very unsteady   Ambulation/Elevation   Gait pattern Foward flexed; Short stride  (very unsteady)   Gait Assistance 3  Moderate assist   Additional items Assist x 1;Verbal cues; Tactile cues   Assistive Device None   Distance 3 feet x 2   Stair Management Assistance Not tested   Balance " Static Sitting Fair   Dynamic Sitting Fair -   Static Standing Fair -   Dynamic Standing Poor +   Ambulatory Poor   Activity Tolerance   Activity Tolerance Patient limited by fatigue  (limited by mentation and unsteady on his feet)   Nurse Made Aware yes: Shahram Sung   Prognosis Good   Problem List Decreased strength;Decreased endurance; Impaired balance;Decreased mobility; Decreased coordination;Decreased cognition; Impaired judgement;Decreased safety awareness;Pain   Assessment Patient seen for Physical Therapy evaluation  Patient admitted with Acute respiratory failure with hypoxia ([facility] [address] )  Comorbidities affecting patient's physical performance include:CAD, DM, pulmonary fibrosis, CKD, alzhiemers dementia  Personal factors affecting patient at time of initial evaluation include: lives in single story house, ambulating with assistive device, stairs to enter home, inability to ambulate household distances, inability to navigate level surfaces without external assistance, decreased cognition, limited home support, limited insight into impairments, inability to perform ADLS, inability to perform IADLS  and inability to live alone  Prior to admission, patient was requiring assist for functional mobility with SPC, requiring assist for ADLS, requiring assist for IADLS, living with wife in a single level home with 2 steps to enter, ambulating household distance and home with family assist   Please find objective findings from Physical Therapy assessment regarding body systems outlined above with impairments and limitations including weakness, impaired balance, decreased endurance, impaired coordination, gait deviations, pain, decreased activity tolerance, decreased functional mobility tolerance, decreased safety awareness, impaired judgement, fall risk and decreased cognition    The Barthel Index was used as a functional outcome tool presenting with a score of Barthel Index Score: 30 today indicating marked limitations of functional mobility and ADLS  Patient's clinical presentation is currently unstable/unpredictable as seen in patient's presentation of vital sign response, varying levels of cognitive performance, increased fall risk, new onset of impairment of functional mobility, decreased endurance and new onset of weakness  Pt would benefit from continued Physical Therapy treatment to address deficits as defined above and maximize level of functional mobility  As demonstrated by objective findings, the assigned level of complexity for this evaluation is high  The patient's -[facility] Basic Mobility Inpatient Short Form Raw Score is 12  A Raw score of less than or equal to 16 suggests the patient may benefit from discharge to post-acute rehabilitation services  Please also refer to the recommendation of the Physical Therapist for safe discharge planning  Goals   Patient Goals unable to state due to mentation   STG Expiration Date 06/28/23   Short Term Goal #1 Transfers supine <> short sit with CG/CS;  Sit <> stand with Min A of 1 and verbal cues  Short Term Goal #2 Ambulate 15 feet with least restrictive AD with Min A and fair + balance   LTG Expiration Date 07/05/23   Long Term Goal #1 Indep tranfers supine <> short sit and sit <> stand with LRAD  Long Term Goal #2 Supervision for amb  with LRAD for 25 feet or greater with good balance  Plan   Treatment/Interventions ADL retraining;Functional transfer training;LE strengthening/ROM; Therapeutic exercise; Endurance training;Cognitive reorientation;Patient/family training;Equipment eval/education; Bed mobility;Gait training;Spoke to nursing;OT   PT Frequency 3-5x/wk   Recommendation   PT Discharge Recommendation Post acute rehabilitation services   Additional Comments Pt is a poor historian  Unsure of living situation and caretakers  Pt is limited by decreased mentation  At this time pt would benefit from STR     [facility] Basic Mobility Inpatient   Turning in Flat Bed Without Bedrails 3   Lying on Back to Sitting on Edge of Flat Bed Without Bedrails 2   Moving Bed to Chair 2   Standing Up From Chair Using Arms 2   Walk in Room 2   Climb 3-5 Stairs With Railing 1   Basic Mobility Inpatient Raw Score 12   Basic Mobility Standardized Score 32 23   Highest Level Of Mobility   -[facility] Goal 4: Move to chair/commode   -HL Achieved 4: Move to chair/commode   Barthel Index   Feeding 5   Bathing 0   Grooming Score 0   Dressing Score 5   Bladder Score 0   Bowels Score 5   Toilet Use Score 5   Transfers (Bed/Chair) Score 5   Mobility (Level Surface) Score 0   Stairs Score 0   Barthel Index Score 25   Additional Treatment Session   Start Time 1000   End Time 1010   Treatment Assessment Pt presents supine in bed  Had just removed his Eric , PT reapplied  Pt is disoriented to time and place and situation  Pt does not know why he is in the hopital   Pt agreeabel to get OOB to chair initially, however once in chair, and alarms intact and all needs in reach  Pt stated that he had a headache and wanted to go back to bed  Pt also felt a little lightheaded  Pt returned to bed with Mod A and verbal cues  Positioned for comfort, bed alarm activated  A: Pt states that he does not use an AD, however pt is very unsteady in static or dynamic standing and taking steps to the chair  Will continue with trying to use a RW next session to see if gait improves  P: Cont as per plan  End of Consult   Patient Position at End of Consult Supine;Bed/Chair alarm activated; All needs within reach   End of Consult Comments RN and CNA aware: pt back in bed  Licensure   [place] License Number  Rashid Diana PT  [id number]

## 2023-06-22 ENCOUNTER — APPOINTMENT (INPATIENT)
Dept: RADIOLOGY | Facility: HOSPITAL | Age: 83
DRG: 196 | End: 2023-06-22
Payer: MEDICARE

## 2023-06-22 LAB
ALBUMIN SERPL BCP-MCNC: 3.5 G/DL (ref 3.5–5)
ALP SERPL-CCNC: 64 U/L (ref 34–104)
ALT SERPL W P-5'-P-CCNC: 12 U/L (ref 7–52)
ANION GAP SERPL CALCULATED.3IONS-SCNC: 9 MMOL/L
AST SERPL W P-5'-P-CCNC: 12 U/L (ref 13–39)
BACTERIA BLD CULT: ABNORMAL
BASOPHILS # BLD AUTO: 0.01 THOUSANDS/ÂΜL (ref 0–0.1)
BASOPHILS NFR BLD AUTO: 0 % (ref 0–1)
BILIRUB SERPL-MCNC: 0.45 MG/DL (ref 0.2–1)
BUN SERPL-MCNC: 41 MG/DL (ref 5–25)
CALCIUM SERPL-MCNC: 9.4 MG/DL (ref 8.4–10.2)
CHLORIDE SERPL-SCNC: 101 MMOL/L (ref 96–108)
CO2 SERPL-SCNC: 25 MMOL/L (ref 21–32)
CREAT SERPL-MCNC: 1.45 MG/DL (ref 0.6–1.3)
EOSINOPHIL # BLD AUTO: 0.02 THOUSAND/ÂΜL (ref 0–0.61)
EOSINOPHIL NFR BLD AUTO: 0 % (ref 0–6)
ERYTHROCYTE [DISTWIDTH] IN BLOOD BY AUTOMATED COUNT: 15.4 % (ref 11.6–15.1)
GFR SERPL CREATININE-BSD FRML MDRD: 44 ML/MIN/1.73SQ M
GLUCOSE SERPL-MCNC: 183 MG/DL (ref 65–140)
GLUCOSE SERPL-MCNC: 197 MG/DL (ref 65–140)
GLUCOSE SERPL-MCNC: 250 MG/DL (ref 65–140)
GLUCOSE SERPL-MCNC: 353 MG/DL (ref 65–140)
GLUCOSE SERPL-MCNC: 356 MG/DL (ref 65–140)
GRAM STN SPEC: ABNORMAL
HCT VFR BLD AUTO: 34.3 % (ref 36.5–49.3)
HGB BLD-MCNC: 11 G/DL (ref 12–17)
IMM GRANULOCYTES # BLD AUTO: 0.06 THOUSAND/UL (ref 0–0.2)
IMM GRANULOCYTES NFR BLD AUTO: 1 % (ref 0–2)
LYMPHOCYTES # BLD AUTO: 1.32 THOUSANDS/ÂΜL (ref 0.6–4.47)
LYMPHOCYTES NFR BLD AUTO: 12 % (ref 14–44)
MCH RBC QN AUTO: 26.1 PG (ref 26.8–34.3)
MCHC RBC AUTO-ENTMCNC: 32.1 G/DL (ref 31.4–37.4)
MCV RBC AUTO: 81 FL (ref 82–98)
MECA+MECC ISLT/SPM QL: DETECTED
MONOCYTES # BLD AUTO: 0.73 THOUSAND/ÂΜL (ref 0.17–1.22)
MONOCYTES NFR BLD AUTO: 6 % (ref 4–12)
NEUTROPHILS # BLD AUTO: 9.26 THOUSANDS/ÂΜL (ref 1.85–7.62)
NEUTS SEG NFR BLD AUTO: 81 % (ref 43–75)
NRBC BLD AUTO-RTO: 0 /100 WBCS
PLATELET # BLD AUTO: 392 THOUSANDS/UL (ref 149–390)
PMV BLD AUTO: 9.8 FL (ref 8.9–12.7)
POTASSIUM SERPL-SCNC: 3.9 MMOL/L (ref 3.5–5.3)
PROT SERPL-MCNC: 7.5 G/DL (ref 6.4–8.4)
RBC # BLD AUTO: 4.22 MILLION/UL (ref 3.88–5.62)
S EPIDERMIDIS DNA BLD POS QL NAA+NON-PRB: DETECTED
SODIUM SERPL-SCNC: 135 MMOL/L (ref 135–147)
WBC # BLD AUTO: 11.4 THOUSAND/UL (ref 4.31–10.16)

## 2023-06-22 PROCEDURE — 85025 COMPLETE CBC W/AUTO DIFF WBC: CPT | Performed by: STUDENT IN AN ORGANIZED HEALTH CARE EDUCATION/TRAINING PROGRAM

## 2023-06-22 PROCEDURE — 93005 ELECTROCARDIOGRAM TRACING: CPT

## 2023-06-22 PROCEDURE — 82948 REAGENT STRIP/BLOOD GLUCOSE: CPT

## 2023-06-22 PROCEDURE — 94760 N-INVAS EAR/PLS OXIMETRY 1: CPT

## 2023-06-22 PROCEDURE — 94640 AIRWAY INHALATION TREATMENT: CPT

## 2023-06-22 PROCEDURE — 80053 COMPREHEN METABOLIC PANEL: CPT | Performed by: STUDENT IN AN ORGANIZED HEALTH CARE EDUCATION/TRAINING PROGRAM

## 2023-06-22 PROCEDURE — 71045 X-RAY EXAM CHEST 1 VIEW: CPT

## 2023-06-22 PROCEDURE — 99232 SBSQ HOSP IP/OBS MODERATE 35: CPT | Performed by: STUDENT IN AN ORGANIZED HEALTH CARE EDUCATION/TRAINING PROGRAM

## 2023-06-22 RX ORDER — METOPROLOL SUCCINATE 50 MG/1
50 TABLET, EXTENDED RELEASE ORAL DAILY
Status: DISCONTINUED | OUTPATIENT
Start: 2023-06-23 | End: 2023-06-24 | Stop reason: HOSPADM

## 2023-06-22 RX ORDER — METOPROLOL TARTRATE 5 MG/5ML
5 INJECTION INTRAVENOUS ONCE
Status: COMPLETED | OUTPATIENT
Start: 2023-06-22 | End: 2023-06-22

## 2023-06-22 RX ADMIN — HEPARIN SODIUM 5000 UNITS: 5000 INJECTION INTRAVENOUS; SUBCUTANEOUS at 14:36

## 2023-06-22 RX ADMIN — BUDESONIDE 0.5 MG: 0.5 INHALANT ORAL at 07:18

## 2023-06-22 RX ADMIN — INSULIN LISPRO 2 UNITS: 100 INJECTION, SOLUTION INTRAVENOUS; SUBCUTANEOUS at 08:13

## 2023-06-22 RX ADMIN — GUAIFENESIN 1200 MG: 600 TABLET, EXTENDED RELEASE ORAL at 17:24

## 2023-06-22 RX ADMIN — BUDESONIDE 0.5 MG: 0.5 INHALANT ORAL at 19:37

## 2023-06-22 RX ADMIN — LEVALBUTEROL HYDROCHLORIDE 1.25 MG: 1.25 SOLUTION RESPIRATORY (INHALATION) at 19:37

## 2023-06-22 RX ADMIN — METOPROLOL TARTRATE 5 MG: 5 INJECTION INTRAVENOUS at 10:42

## 2023-06-22 RX ADMIN — FORMOTEROL FUMARATE DIHYDRATE 20 MCG: 20 SOLUTION RESPIRATORY (INHALATION) at 07:08

## 2023-06-22 RX ADMIN — METOPROLOL SUCCINATE 25 MG: 25 TABLET, EXTENDED RELEASE ORAL at 08:13

## 2023-06-22 RX ADMIN — BUSPIRONE HYDROCHLORIDE 5 MG: 5 TABLET ORAL at 21:26

## 2023-06-22 RX ADMIN — LEVALBUTEROL HYDROCHLORIDE 1.25 MG: 1.25 SOLUTION RESPIRATORY (INHALATION) at 07:18

## 2023-06-22 RX ADMIN — BUSPIRONE HYDROCHLORIDE 5 MG: 5 TABLET ORAL at 08:13

## 2023-06-22 RX ADMIN — INSULIN GLARGINE 30 UNITS: 100 INJECTION, SOLUTION SUBCUTANEOUS at 21:25

## 2023-06-22 RX ADMIN — LEVALBUTEROL HYDROCHLORIDE 1.25 MG: 1.25 SOLUTION RESPIRATORY (INHALATION) at 13:27

## 2023-06-22 RX ADMIN — IPRATROPIUM BROMIDE 0.5 MG: 0.5 SOLUTION RESPIRATORY (INHALATION) at 07:18

## 2023-06-22 RX ADMIN — IPRATROPIUM BROMIDE 0.5 MG: 0.5 SOLUTION RESPIRATORY (INHALATION) at 19:37

## 2023-06-22 RX ADMIN — ASPIRIN 81 MG CHEWABLE TABLET 81 MG: 81 TABLET CHEWABLE at 08:14

## 2023-06-22 RX ADMIN — ESCITALOPRAM OXALATE 10 MG: 10 TABLET ORAL at 21:26

## 2023-06-22 RX ADMIN — INSULIN LISPRO 10 UNITS: 100 INJECTION, SOLUTION INTRAVENOUS; SUBCUTANEOUS at 16:30

## 2023-06-22 RX ADMIN — SENNOSIDES 8.6 MG: 8.6 TABLET, FILM COATED ORAL at 08:14

## 2023-06-22 RX ADMIN — TAMSULOSIN HYDROCHLORIDE 0.4 MG: 0.4 CAPSULE ORAL at 17:24

## 2023-06-22 RX ADMIN — MIDODRINE HYDROCHLORIDE 5 MG: 5 TABLET ORAL at 17:26

## 2023-06-22 RX ADMIN — HEPARIN SODIUM 5000 UNITS: 5000 INJECTION INTRAVENOUS; SUBCUTANEOUS at 21:26

## 2023-06-22 RX ADMIN — IPRATROPIUM BROMIDE 0.5 MG: 0.5 SOLUTION RESPIRATORY (INHALATION) at 13:27

## 2023-06-22 RX ADMIN — INSULIN LISPRO 4 UNITS: 100 INJECTION, SOLUTION INTRAVENOUS; SUBCUTANEOUS at 12:25

## 2023-06-22 RX ADMIN — HEPARIN SODIUM 5000 UNITS: 5000 INJECTION INTRAVENOUS; SUBCUTANEOUS at 06:44

## 2023-06-22 RX ADMIN — MIDODRINE HYDROCHLORIDE 5 MG: 5 TABLET ORAL at 06:44

## 2023-06-22 RX ADMIN — FORMOTEROL FUMARATE DIHYDRATE 20 MCG: 20 SOLUTION RESPIRATORY (INHALATION) at 19:37

## 2023-06-22 RX ADMIN — GUAIFENESIN 1200 MG: 600 TABLET, EXTENDED RELEASE ORAL at 08:13

## 2023-06-22 RX ADMIN — INSULIN LISPRO 10 UNITS: 100 INJECTION, SOLUTION INTRAVENOUS; SUBCUTANEOUS at 21:25

## 2023-06-22 RX ADMIN — PREDNISONE 40 MG: 20 TABLET ORAL at 08:14

## 2023-06-22 NOTE — PLAN OF CARE
Problem: RESPIRATORY - ADULT  Goal: Achieves optimal ventilation and oxygenation  Description: INTERVENTIONS:  - Assess for changes in respiratory status  - Assess for changes in mentation and behavior  - Position to facilitate oxygenation and minimize respiratory effort  - Oxygen administered by appropriate delivery if ordered  - Initiate smoking cessation education as indicated  - Encourage broncho-pulmonary hygiene including cough, deep breathe, Incentive Spirometry  - Assess the need for suctioning and aspirate as needed  - Assess and instruct to report SOB or any respiratory difficulty  - Respiratory Therapy support as indicated  Outcome: Progressing     Problem: MOBILITY - ADULT  Goal: Maintain or return to baseline ADL function  Description: INTERVENTIONS:  -  Assess patient's ability to carry out ADLs; assess patient's baseline for ADL function and identify physical deficits which impact ability to perform ADLs (bathing, care of mouth/teeth, toileting, grooming, dressing, etc )  - Assess/evaluate cause of self-care deficits   - Assess range of motion  - Assess patient's mobility; develop plan if impaired  - Assess patient's need for assistive devices and provide as appropriate  - Encourage maximum independence but intervene and supervise when necessary  - Involve family in performance of ADLs  - Assess for home care needs following discharge   - Consider OT consult to assist with ADL evaluation and planning for discharge  - Provide patient education as appropriate  Outcome: Progressing     Problem: SAFETY ADULT  Goal: Patient will remain free of falls  Description: INTERVENTIONS:  - Educate patient/family on patient safety including physical limitations  - Instruct patient to call for assistance with activity   - Consult OT/PT to assist with strengthening/mobility   - Keep Call bell within reach  - Keep bed low and locked with side rails adjusted as appropriate  - Keep care items and personal belongings within reach  - Initiate and maintain comfort rounds  - Make Fall Risk Sign visible to staff  - Offer Toileting every 2 Hours, in advance of need  - Initiate/Maintain bed/chair alarm  - Obtain necessary fall risk management equipment: fall risk sign  - Apply yellow socks and bracelet for high fall risk patients  - Consider moving patient to room near nurses station  Outcome: Progressing

## 2023-06-22 NOTE — PLAN OF CARE
Problem: RESPIRATORY - ADULT  Goal: Achieves optimal ventilation and oxygenation  Description: INTERVENTIONS:  - Assess for changes in respiratory status  - Assess for changes in mentation and behavior  - Position to facilitate oxygenation and minimize respiratory effort  - Oxygen administered by appropriate delivery if ordered  - Initiate smoking cessation education as indicated  - Encourage broncho-pulmonary hygiene including cough, deep breathe, Incentive Spirometry  - Assess the need for suctioning and aspirate as needed  - Assess and instruct to report SOB or any respiratory difficulty  - Respiratory Therapy support as indicated  Outcome: Progressing     Problem: SAFETY ADULT  Goal: Maintain or return to baseline ADL function  Description: INTERVENTIONS:  -  Assess patient's ability to carry out ADLs; assess patient's baseline for ADL function and identify physical deficits which impact ability to perform ADLs (bathing, care of mouth/teeth, toileting, grooming, dressing, etc )  - Assess/evaluate cause of self-care deficits   - Assess range of motion  - Assess patient's mobility; develop plan if impaired  - Assess patient's need for assistive devices and provide as appropriate  - Encourage maximum independence but intervene and supervise when necessary  - Involve family in performance of ADLs  - Assess for home care needs following discharge   - Consider OT consult to assist with ADL evaluation and planning for discharge  - Provide patient education as appropriate  Outcome: Progressing     Problem: SAFETY ADULT  Goal: Patient will remain free of falls  Description: INTERVENTIONS:  - Educate patient/family on patient safety including physical limitations  - Instruct patient to call for assistance with activity   - Consult OT/PT to assist with strengthening/mobility   - Keep Call bell within reach  - Keep bed low and locked with side rails adjusted as appropriate  - Keep care items and personal belongings within reach  - Initiate and maintain comfort rounds  - Make Fall Risk Sign visible to staff  - Offer Toileting every 2 Hours, in advance of need  - Initiate/Maintain bed/chair alarm  - Obtain necessary fall risk management equipment: fall risk sign  - Apply yellow socks and bracelet for high fall risk patients  - Consider moving patient to room near nurses station  Outcome: Progressing

## 2023-06-22 NOTE — PROGRESS NOTES
INTERNAL MEDICINE PROGRESS NOTE     Name: Naomi Orellana   Age & Sex: 80 y o  male   MRN: [id number]  Unit/Bed#: [address] [place] [address]   Encounter: [id number]  Hospital Stay Days: 3      ASSESSMENT/PLAN     Principal Problem:    Acute respiratory failure with hypoxia ([facility] [address] )  Active Problems:    CAD (coronary artery disease)    Type 2 diabetes mellitus, with long-term current use of insulin (HCC)    Orthostatic hypotension    Chronic idiopathic pulmonary fibrosis (HCC)    Benign prostatic hyperplasia without lower urinary tract symptoms    Stage 3b chronic kidney disease (HCC)    Paroxysmal A-fib (HCC)    Moderate late onset Alzheimer's dementia ([facility] [address] )      Moderate late onset Alzheimer's dementia ([facility] [address] )  Assessment & Plan  On my evaluation in the ER patient was very confused  He was unable to tell me why he was in the ER and where he came from  Discussed with patient's wife who states that this is his baseline as his dementia has been progressing over the last 6 months  Discussed CODE STATUS with patient's wife who states that the patient would want to be DO NOT RESUSCITATE DO NOT INTUBATE  CODE STATUS changed to level 3 DNR/DNI    Paroxysmal A-fib [facility])  Assessment & Plan  After discussion with family, patient has been off Eliquis for the last 6 months given GI bleeding, will discontinue and start DVT prophylaxis  Toprol-XL 25 mg daily, patient did have one event of A-fib with RVR, rate 130, controlled with Lopressor 5 mg IV    Recommend increasing Toprol-XL to 50 mg daily, telemetry  Risk versus benefits discussed of anticoagulation, will continue to hold off given history of GI bleeding, other underlying chronic comorbidities      Stage 3b chronic kidney disease [facility])  Assessment & Plan  Lab Results   Component Value Date    EGFR 44 06/20/2023    EGFR 44 06/19/2023    EGFR 42 01/03/2023    CREATININE 1 44 (H) 06/20/2023    CREATININE 1 45 (H) 06/19/2023    CREATININE 1 51 (H) 01/03/2023     Creatinine baseline around 1 4-1 6  Currently at baseline    Benign prostatic hyperplasia without lower urinary tract symptoms  Assessment & Plan  Continue Flomax    Chronic idiopathic pulmonary fibrosis [facility])  Assessment & Plan  Patient presents with acute hypoxic respiratory failure in setting of possible idiopathic pulmonary fibrosis  Patient admits to having increased shortness of breath with increased cough no sputum production    CT chest in May 2023 showed the following: Interstitial lung fibrosis  The distribution is somewhat atypical for UIP given lack of a clear apical basal gradient, and honeycombing in the anterior lung fields, and findings suggestive of air trapping  Admission chest x-ray official read pending but noted for diffuse parenchymal disease  Currently on 3 L oxygen  Noted to have diffuse crackles on exam which I suspect is secondary to pulmonary fibrosis    Status post ceftriaxone and azithromycin in the ER  Patient's only symptoms are shortness of breath and nonproductive cough  Very low suspicion for infectious etiology given patient has no other signs and symptoms of infection, is afebrile, no leukocytosis  Procalcitonin slightly elevated  Continue azithromycin x3 days  Status post DuoNeb and Lasix 40 IV in the ER  Will give Solu-Medrol 60 mg IV once and placed on prednisone 40 mg daily for 5 days  Placed on around-the-clock Xopenex, Atrovent, and Pulmicort nebulizer treatments  We will hold further Lasix for now as patient does not appear to be overtly volume overloaded and suspect the crackles are secondary to pulmonary fibrosis and not from pulmonary edema  Continue inhaler treatments, prednisone x5 days, titrate oxygen SPO2 > 90%, monitor leukocytosis which is improving today, most likely steroid driven, procalcitonin negative  Confirm oxygen requirements at nursing facility prior to discharge      Orthostatic hypotension  Assessment & Plan  Continue midodrine 5 mg 3 times daily    Type 2 diabetes mellitus, with long-term current use of insulin [facility])  Assessment & Plan  Lab Results   Component Value Date    HGBA1C 7 0 (H) 12/30/2022       Recent Labs     06/19/23 2042 06/20/23  0704   POCGLU 217* 291*       Blood Sugar Average: Last 72 hrs:  (P) 254   Carb consistent diet  Insulin sliding scale, Lantus nightly    CAD (coronary artery disease)  Assessment & Plan  Continue ASA, Eliquis, Statin    Stress test 5/8: Left ventricular perfusion is abnormal   There is a partially reversible inferior lateral defect extending from base to mid area  EF is 62%  TID index quantitatively measured to be 1 5  Visually appears less cannot rule out balanced ischemia    Recommend outpatient cardiology      * Acute respiratory failure with hypoxia ([address] )  Assessment & Plan  Continue to titrate oxygen for SPO2 greater than 90%  Monitor off antibiotics, leukocytosis appears to be driven by steroids, procalcitonin negative  Known history of pulmonary fibrosis, follow-up pulmonology    Repeat chest x-ray imaging today to rule out infiltrate        VTE Pharmacologic Prophylaxis:   Pharmacologic: Heparin  Mechanical VTE Prophylaxis in Place: Yes    Patient Centered Rounds: I have performed bedside rounds with nursing staff today  Discussions with Specialists or Other Care Team Provider: N/A    Education and Discussions with Family / Patient: Family discussion this afternoon plan    Time Spent for Care: 45 minutes  More than 50% of total time spent on counseling and coordination of care as described above  Current Length of Stay: 3 day(s)    Current Patient Status: Inpatient   Certification Statement: The patient will continue to require additional inpatient hospital stay due to Acute hypoxic respiratory failure secondary to pulmonary fibrosis    Discharge Plan / Estimated Discharge Date: 24-48 hrs      Code Status: Level 3 - DNAR and DNI    Subjective:     Patient is a 71-year-old male seen and examined at bedside this morning, patient notes that he is doing well, eating and drinking appropriately  History mild/moderate dementia  Elevated heart rate 11 AM, appears to be A-fib with RVR, Lopressor 5 mg IV, now NSR, rate 80, patient asymptomatic throughout  Known history of underlying atrial fibrillation  Objective:   Vitals:   Temp (24hrs), Av 8 °F (36 6 °C), Min:97 3 °F (36 3 °C), Max:98 1 °F (36 7 °C)    Temp:  [97 3 °F (36 3 °C)-98 1 °F (36 7 °C)] 98 °F (36 7 °C)  HR:  [] 81  Resp:  [18-20] 18  BP: (132-168)/() 145/88  SpO2:  [90 %-99 %] 96 %  Body mass index is 21 52 kg/m²  Input and Output Summary (last 24 hours): Intake/Output Summary (Last 24 hours) at 2023 1221  Last data filed at 2023 1213  Gross per 24 hour   Intake --   Output 715 ml   Net -715 ml     Physical Exam:   Physical Exam  Constitutional:       General: He is not in acute distress  HENT:      Head: Normocephalic and atraumatic  Eyes:      General: No scleral icterus  Conjunctiva/sclera: Conjunctivae normal    Cardiovascular:      Rate and Rhythm: Normal rate  Rhythm irregular  Pulses: Normal pulses  Heart sounds: No murmur heard  Pulmonary:      Effort: Pulmonary effort is normal  No respiratory distress  Breath sounds: Rhonchi present  No wheezing or rales  Abdominal:      General: Bowel sounds are normal  There is no distension  Palpations: Abdomen is soft  Tenderness: There is no abdominal tenderness  Musculoskeletal:         General: No swelling  Normal range of motion  Skin:     General: Skin is warm and dry  Capillary Refill: Capillary refill takes less than 2 seconds  Neurological:      General: No focal deficit present  Mental Status: He is alert and oriented to person, place, and time  Mental status is at baseline     Psychiatric:         Mood and Affect: Mood normal          Behavior: Behavior normal            Additional Data:   Basic Laboratory Review: Results from last 7 days   Lab Units 06/22/23  0628 06/21/23  0531 06/20/23  0512 06/19/23  1558   SODIUM mmol/L 135 134* 134* 133*   POTASSIUM mmol/L 3 9 4 7 4 6 4 3   CHLORIDE mmol/L 101 102 99 98   CO2 mmol/L 25 24 24 28   BUN mg/dL 41* 42* 34* 31*   CREATININE mg/dL 1 45* 1 57* 1 44* 1 45*   GLUCOSE RANDOM mg/dL 183* 286* 272* 270*   CALCIUM mg/dL 9 4 9 3 9 1 9 0   ALBUMIN g/dL 3 5 3 4*  --  3 3*   ALK PHOS U/L 64 66  --  72   ALT U/L 12 11  --  8   AST U/L 12* 10*  --  10*   EGFR ml/min/1 73sq m 44 40 44 44       Results from last 7 days   Lab Units 06/22/23  0628 06/21/23  0531 06/20/23  0512 06/19/23  1558   WBC Thousand/uL 11 40* 16 62* 7 55 9 56   HEMOGLOBIN g/dL 11 0* 10 2* 11 1* 10 5*   HEMATOCRIT % 34 3* 32 9* 36 1* 34 8*   PLATELETS Thousands/uL 392* 385 355 323   NEUTROS PCT % 81* 90*  --  78*   LYMPHS PCT % 12* 5*  --  9*   MONOS PCT % 6 4  --  9   EOS PCT % 0 0  --  4   BASOS PCT % 0 0  --  0   NEUTROS ABS Thousands/µL 9 26* 15 14*  --  7 45   LYMPHS ABS Thousands/µL 1 32 0 77  --  0 84   MONOS ABS Thousand/µL 0 73 0 59  --  0 84   EOS ABS Thousand/µL 0 02 0 00  --  0 37   BASOS ABS Thousands/µL 0 01 0 01  --  0 03         Results from last 7 days   Lab Units 06/19/23  1558   BNP pg/mL 50       Additional Labs:  Results from last 7 days   Lab Units 06/21/23  0531 06/20/23  0512 06/19/23  1558   MAGNESIUM mg/dL 1 9 1 8*  1 8*  --    PHOSPHORUS mg/dL  --  4 7* 3 2                Recent Cultures (last 7 days):   Results from last 7 days   Lab Units 06/21/23  1250 06/19/23  1746 06/19/23  1725   BLOOD CULTURE  Received in Microbiology Lab  Culture in Progress  Received in Microbiology Lab  Culture in Progress  No Growth at 48 hrs  Staphylococcus epidermidis*   GRAM STAIN RESULT   --   --  Gram positive cocci in clusters*       * I Have Reviewed All Lab Data Listed Above  * Additional Pertinent Lab Tests Reviewed:  [address] Admission Reviewed    Imaging:  Prior imaging studies and reports reviewed    Last 24 Hours Medication List:   Current Facility-Administered Medications   Medication Dose Route Frequency Provider Last Rate   • acetaminophen  650 mg Oral Q6H PRN Nika Barton, DO     • aspirin  81 mg Oral Daily Nika Barton, DO     • benzonatate  100 mg Oral TID PRN Jeremy Valles, DO     • budesonide  0 5 mg Nebulization Q12H Nika aBrton, DO     • busPIRone  5 mg Oral BID Nika Barton, DO     • escitalopram  10 mg Oral HS Nika Barton, DO     • formoterol  20 mcg Nebulization Q12H Nika Barton, DO     • guaiFENesin  1,200 mg Oral BID Nika Barton, DO     • heparin (porcine)  5,000 Units Subcutaneous [facility] Jeremy Valles, DO     • insulin glargine  30 Units Subcutaneous HS Jeremy Valles, DO     • insulin lispro  2-12 Units Subcutaneous TID AC Jeremy Valles, DO     • insulin lispro  2-12 Units Subcutaneous HS Jeremy Valles, DO     • ipratropium  0 5 mg Nebulization TID Nika Barton, DO     • levalbuterol  1 25 mg Nebulization TID Nika Barton, DO     • [START ON 6/23/2023] metoprolol succinate  50 mg Oral Daily Jeremy Valles, DO     • midodrine  5 mg Oral TID AC Nika Barton DO     • predniSONE  40 mg Oral Daily Nika Barton, DO     • senna  1 tablet Oral Daily Nika Barton DO     • tamsulosin  0 4 mg Oral Daily With Dinner Alen Resendiz DO       Today, Patient Was Seen By: Shelly Hardin,

## 2023-06-23 LAB
ALBUMIN SERPL BCP-MCNC: 3.5 G/DL (ref 3.5–5)
ALP SERPL-CCNC: 66 U/L (ref 34–104)
ALT SERPL W P-5'-P-CCNC: 14 U/L (ref 7–52)
ANION GAP SERPL CALCULATED.3IONS-SCNC: 11 MMOL/L
ANION GAP SERPL CALCULATED.3IONS-SCNC: 8 MMOL/L
AST SERPL W P-5'-P-CCNC: 13 U/L (ref 13–39)
ATRIAL RATE: 120 BPM
ATRIAL RATE: 125 BPM
BASOPHILS # BLD AUTO: 0.02 THOUSANDS/ÂΜL (ref 0–0.1)
BASOPHILS NFR BLD AUTO: 0 % (ref 0–1)
BILIRUB SERPL-MCNC: 0.45 MG/DL (ref 0.2–1)
BUN SERPL-MCNC: 39 MG/DL (ref 5–25)
BUN SERPL-MCNC: 45 MG/DL (ref 5–25)
CALCIUM SERPL-MCNC: 9 MG/DL (ref 8.4–10.2)
CALCIUM SERPL-MCNC: 9.3 MG/DL (ref 8.4–10.2)
CHLORIDE SERPL-SCNC: 102 MMOL/L (ref 96–108)
CHLORIDE SERPL-SCNC: 97 MMOL/L (ref 96–108)
CO2 SERPL-SCNC: 23 MMOL/L (ref 21–32)
CO2 SERPL-SCNC: 24 MMOL/L (ref 21–32)
CREAT SERPL-MCNC: 1.41 MG/DL (ref 0.6–1.3)
CREAT SERPL-MCNC: 1.64 MG/DL (ref 0.6–1.3)
EOSINOPHIL # BLD AUTO: 0.06 THOUSAND/ÂΜL (ref 0–0.61)
EOSINOPHIL NFR BLD AUTO: 1 % (ref 0–6)
ERYTHROCYTE [DISTWIDTH] IN BLOOD BY AUTOMATED COUNT: 15.3 % (ref 11.6–15.1)
GFR SERPL CREATININE-BSD FRML MDRD: 38 ML/MIN/1.73SQ M
GFR SERPL CREATININE-BSD FRML MDRD: 46 ML/MIN/1.73SQ M
GLUCOSE SERPL-MCNC: 112 MG/DL (ref 65–140)
GLUCOSE SERPL-MCNC: 123 MG/DL (ref 65–140)
GLUCOSE SERPL-MCNC: 230 MG/DL (ref 65–140)
GLUCOSE SERPL-MCNC: 304 MG/DL (ref 65–140)
GLUCOSE SERPL-MCNC: 340 MG/DL (ref 65–140)
GLUCOSE SERPL-MCNC: 358 MG/DL (ref 65–140)
HCT VFR BLD AUTO: 34 % (ref 36.5–49.3)
HGB BLD-MCNC: 10.6 G/DL (ref 12–17)
IMM GRANULOCYTES # BLD AUTO: 0.13 THOUSAND/UL (ref 0–0.2)
IMM GRANULOCYTES NFR BLD AUTO: 1 % (ref 0–2)
LYMPHOCYTES # BLD AUTO: 1.56 THOUSANDS/ÂΜL (ref 0.6–4.47)
LYMPHOCYTES NFR BLD AUTO: 13 % (ref 14–44)
MAGNESIUM SERPL-MCNC: 1.8 MG/DL (ref 1.9–2.7)
MCH RBC QN AUTO: 25.1 PG (ref 26.8–34.3)
MCHC RBC AUTO-ENTMCNC: 31.2 G/DL (ref 31.4–37.4)
MCV RBC AUTO: 81 FL (ref 82–98)
MONOCYTES # BLD AUTO: 0.95 THOUSAND/ÂΜL (ref 0.17–1.22)
MONOCYTES NFR BLD AUTO: 8 % (ref 4–12)
NEUTROPHILS # BLD AUTO: 9.25 THOUSANDS/ÂΜL (ref 1.85–7.62)
NEUTS SEG NFR BLD AUTO: 77 % (ref 43–75)
NRBC BLD AUTO-RTO: 0 /100 WBCS
PLATELET # BLD AUTO: 405 THOUSANDS/UL (ref 149–390)
PMV BLD AUTO: 9.9 FL (ref 8.9–12.7)
POTASSIUM SERPL-SCNC: 3.8 MMOL/L (ref 3.5–5.3)
POTASSIUM SERPL-SCNC: 4.7 MMOL/L (ref 3.5–5.3)
PROT SERPL-MCNC: 7.3 G/DL (ref 6.4–8.4)
QRS AXIS: -11 DEGREES
QRS AXIS: -11 DEGREES
QRSD INTERVAL: 94 MS
QRSD INTERVAL: 96 MS
QT INTERVAL: 314 MS
QT INTERVAL: 316 MS
QTC INTERVAL: 438 MS
QTC INTERVAL: 446 MS
RBC # BLD AUTO: 4.22 MILLION/UL (ref 3.88–5.62)
SODIUM SERPL-SCNC: 129 MMOL/L (ref 135–147)
SODIUM SERPL-SCNC: 136 MMOL/L (ref 135–147)
T WAVE AXIS: 27 DEGREES
T WAVE AXIS: 37 DEGREES
VENTRICULAR RATE: 117 BPM
VENTRICULAR RATE: 120 BPM
WBC # BLD AUTO: 11.97 THOUSAND/UL (ref 4.31–10.16)

## 2023-06-23 PROCEDURE — 93010 ELECTROCARDIOGRAM REPORT: CPT | Performed by: INTERNAL MEDICINE

## 2023-06-23 PROCEDURE — 99232 SBSQ HOSP IP/OBS MODERATE 35: CPT | Performed by: STUDENT IN AN ORGANIZED HEALTH CARE EDUCATION/TRAINING PROGRAM

## 2023-06-23 PROCEDURE — 80048 BASIC METABOLIC PNL TOTAL CA: CPT | Performed by: STUDENT IN AN ORGANIZED HEALTH CARE EDUCATION/TRAINING PROGRAM

## 2023-06-23 PROCEDURE — 97110 THERAPEUTIC EXERCISES: CPT

## 2023-06-23 PROCEDURE — 85025 COMPLETE CBC W/AUTO DIFF WBC: CPT | Performed by: STUDENT IN AN ORGANIZED HEALTH CARE EDUCATION/TRAINING PROGRAM

## 2023-06-23 PROCEDURE — 80053 COMPREHEN METABOLIC PANEL: CPT | Performed by: STUDENT IN AN ORGANIZED HEALTH CARE EDUCATION/TRAINING PROGRAM

## 2023-06-23 PROCEDURE — 94640 AIRWAY INHALATION TREATMENT: CPT

## 2023-06-23 PROCEDURE — 82948 REAGENT STRIP/BLOOD GLUCOSE: CPT

## 2023-06-23 PROCEDURE — 94760 N-INVAS EAR/PLS OXIMETRY 1: CPT

## 2023-06-23 PROCEDURE — 83735 ASSAY OF MAGNESIUM: CPT | Performed by: STUDENT IN AN ORGANIZED HEALTH CARE EDUCATION/TRAINING PROGRAM

## 2023-06-23 RX ORDER — MAGNESIUM SULFATE HEPTAHYDRATE 40 MG/ML
2 INJECTION, SOLUTION INTRAVENOUS ONCE
Status: COMPLETED | OUTPATIENT
Start: 2023-06-23 | End: 2023-06-24

## 2023-06-23 RX ORDER — POTASSIUM CHLORIDE 20 MEQ/1
40 TABLET, EXTENDED RELEASE ORAL ONCE
Status: COMPLETED | OUTPATIENT
Start: 2023-06-23 | End: 2023-06-23

## 2023-06-23 RX ORDER — METOPROLOL TARTRATE 5 MG/5ML
5 INJECTION INTRAVENOUS ONCE
Status: COMPLETED | OUTPATIENT
Start: 2023-06-23 | End: 2023-06-23

## 2023-06-23 RX ADMIN — INSULIN GLARGINE 30 UNITS: 100 INJECTION, SOLUTION SUBCUTANEOUS at 21:49

## 2023-06-23 RX ADMIN — IPRATROPIUM BROMIDE 0.5 MG: 0.5 SOLUTION RESPIRATORY (INHALATION) at 07:21

## 2023-06-23 RX ADMIN — METOPROLOL TARTRATE 5 MG: 5 INJECTION INTRAVENOUS at 09:05

## 2023-06-23 RX ADMIN — HEPARIN SODIUM 5000 UNITS: 5000 INJECTION INTRAVENOUS; SUBCUTANEOUS at 05:16

## 2023-06-23 RX ADMIN — BUDESONIDE 0.5 MG: 0.5 INHALANT ORAL at 07:21

## 2023-06-23 RX ADMIN — GUAIFENESIN 1200 MG: 600 TABLET, EXTENDED RELEASE ORAL at 16:29

## 2023-06-23 RX ADMIN — LEVALBUTEROL HYDROCHLORIDE 1.25 MG: 1.25 SOLUTION RESPIRATORY (INHALATION) at 13:13

## 2023-06-23 RX ADMIN — INSULIN LISPRO 8 UNITS: 100 INJECTION, SOLUTION INTRAVENOUS; SUBCUTANEOUS at 21:50

## 2023-06-23 RX ADMIN — FORMOTEROL FUMARATE DIHYDRATE 20 MCG: 20 SOLUTION RESPIRATORY (INHALATION) at 20:02

## 2023-06-23 RX ADMIN — POTASSIUM CHLORIDE 40 MEQ: 1500 TABLET, EXTENDED RELEASE ORAL at 18:48

## 2023-06-23 RX ADMIN — APIXABAN 2.5 MG: 2.5 TABLET, FILM COATED ORAL at 16:29

## 2023-06-23 RX ADMIN — SENNOSIDES 8.6 MG: 8.6 TABLET, FILM COATED ORAL at 08:03

## 2023-06-23 RX ADMIN — TAMSULOSIN HYDROCHLORIDE 0.4 MG: 0.4 CAPSULE ORAL at 16:24

## 2023-06-23 RX ADMIN — BUSPIRONE HYDROCHLORIDE 5 MG: 5 TABLET ORAL at 21:47

## 2023-06-23 RX ADMIN — INSULIN LISPRO 8 UNITS: 100 INJECTION, SOLUTION INTRAVENOUS; SUBCUTANEOUS at 16:25

## 2023-06-23 RX ADMIN — BUSPIRONE HYDROCHLORIDE 5 MG: 5 TABLET ORAL at 08:02

## 2023-06-23 RX ADMIN — FORMOTEROL FUMARATE DIHYDRATE 20 MCG: 20 SOLUTION RESPIRATORY (INHALATION) at 07:08

## 2023-06-23 RX ADMIN — LEVALBUTEROL HYDROCHLORIDE 1.25 MG: 1.25 SOLUTION RESPIRATORY (INHALATION) at 19:44

## 2023-06-23 RX ADMIN — ASPIRIN 81 MG CHEWABLE TABLET 81 MG: 81 TABLET CHEWABLE at 08:01

## 2023-06-23 RX ADMIN — PREDNISONE 40 MG: 20 TABLET ORAL at 08:03

## 2023-06-23 RX ADMIN — MAGNESIUM SULFATE HEPTAHYDRATE 2 G: 40 INJECTION, SOLUTION INTRAVENOUS at 18:49

## 2023-06-23 RX ADMIN — BUDESONIDE 0.5 MG: 0.5 INHALANT ORAL at 19:44

## 2023-06-23 RX ADMIN — IPRATROPIUM BROMIDE 0.5 MG: 0.5 SOLUTION RESPIRATORY (INHALATION) at 19:44

## 2023-06-23 RX ADMIN — LEVALBUTEROL HYDROCHLORIDE 1.25 MG: 1.25 SOLUTION RESPIRATORY (INHALATION) at 07:21

## 2023-06-23 RX ADMIN — ESCITALOPRAM OXALATE 10 MG: 10 TABLET ORAL at 21:49

## 2023-06-23 RX ADMIN — INSULIN LISPRO 4 UNITS: 100 INJECTION, SOLUTION INTRAVENOUS; SUBCUTANEOUS at 11:29

## 2023-06-23 RX ADMIN — IPRATROPIUM BROMIDE 0.5 MG: 0.5 SOLUTION RESPIRATORY (INHALATION) at 13:13

## 2023-06-23 RX ADMIN — APIXABAN 2.5 MG: 2.5 TABLET, FILM COATED ORAL at 10:17

## 2023-06-23 RX ADMIN — GUAIFENESIN 1200 MG: 600 TABLET, EXTENDED RELEASE ORAL at 08:02

## 2023-06-23 RX ADMIN — METOPROLOL SUCCINATE 50 MG: 50 TABLET, EXTENDED RELEASE ORAL at 08:03

## 2023-06-23 NOTE — PHYSICAL THERAPY NOTE
PT TREATMENT     06/23/23 1500   Note Type   Note Type Treatment   Pain Assessment   Pain Assessment Tool Barton-Baker FACES   Barton-Baker FACES Pain Rating 0   Restrictions/Precautions   Other Precautions Cognitive; Chair Alarm; Bed Alarm; Fall Risk   General   Chart Reviewed Yes   Family/Caregiver Present No   Cognition   Arousal/Participation Cooperative   Subjective   Subjective Patient confused although pleasant and without pain complaints at this time   Bed Mobility   Supine to Sit 3  Moderate assistance   Additional items Assist x 1   Sit to Supine 3  Moderate assistance   Additional items Assist x 1   Additional Comments Patient deferred out of bed and standing activity although cooperative, resistive to out of bed in chair  Patient agreeable to exercise   Exercises   Hamstring Stretch Supine;5 reps;PROM; Bilateral   Hamstring Sets Supine;5 reps;Bilateral   Quad Sets Supine;10 reps;Bilateral   Heelslides Supine;10 reps;Bilateral   Glute Sets Supine;5 reps   Hip Abduction Supine;15 reps;Bilateral   Hip Adduction Supine;15 reps;Bilateral   Knee AROM Short Arc Quad Supine;10 reps;Bilateral   Ankle Pumps Supine;20 reps;Bilateral   Marching Supine;10 reps;Bilateral   Bridging Supine;5 reps   Assessment   Assessment Patient cooperative although confused  Patient will benefit from increasing functional mobility with clinical staff and continued physical therapy with progression as tolerated to regain functional mobility  Patient will benefit from postacute rehab services to regain function  The patient's AM-PAC Basic Mobility Inpatient Short Form Raw Score is 11  A Raw score of less than or equal to 16 suggests the patient may benefit from discharge to post-acute rehabilitation services  Please also refer to the recommendation of the Physical Therapist for safe discharge planning  Plan   Treatment/Interventions ADL retraining;Functional transfer training;LE strengthening/ROM; Therapeutic exercise; Endurance training;Cognitive reorientation;Patient/family training;Equipment eval/education; Bed mobility;Gait training; Compensatory technique education   PT Frequency 3-5x/wk   Recommendation   PT Discharge Recommendation Post acute rehabilitation services   AM-PAC Basic Mobility Inpatient   Turning in Flat Bed Without Bedrails 2   Lying on Back to Sitting on Edge of Flat Bed Without Bedrails 2   Moving Bed to Chair 2   Standing Up From Chair Using Arms 2   Walk in Room 2   Climb 3-5 Stairs With Railing 1   Basic Mobility Inpatient Raw Score 11   Basic Mobility Standardized Score 30 25   Highest Level Of Mobility   JH-HLM Goal 4: Move to chair/commode   JH-HLM Achieved 4: Move to chair/commode  (Out of bed earlier today as per nursing staff)   Education   Patient Reinforcement needed   Licensure   [place] License Number  Matt Pierre, PT 4 0 [id number] [id number]

## 2023-06-23 NOTE — PROGRESS NOTES
INTERNAL MEDICINE PROGRESS NOTE     Name: Sue Lo   Age & Sex: 80 y o  male   MRN: [id number]  Unit/Bed#: [address] [place] [address]   Encounter: [id number]  Hospital Stay Days: 4      ASSESSMENT/PLAN     Principal Problem:    Acute respiratory failure with hypoxia ([facility] [address] )  Active Problems:    CAD (coronary artery disease)    Type 2 diabetes mellitus, with long-term current use of insulin (HCC)    Orthostatic hypotension    Chronic idiopathic pulmonary fibrosis (HCC)    Benign prostatic hyperplasia without lower urinary tract symptoms    Stage 3b chronic kidney disease (HCC)    Paroxysmal A-fib (HCC)    Moderate late onset Alzheimer's dementia ([facility] [address] )      Moderate late onset Alzheimer's dementia ([facility] [address] )  Assessment & Plan  On my evaluation in the ER patient was very confused  He was unable to tell me why he was in the ER and where he came from  Discussed with patient's wife who states that this is his baseline as his dementia has been progressing over the last 6 months  Discussed CODE STATUS with patient's wife who states that the patient would want to be DO NOT RESUSCITATE DO NOT INTUBATE    CODE STATUS changed to level 3 DNR/DNI    Paroxysmal A-fib ([facility] [address] )  Assessment & Plan  After discussion with family, patient has been off Eliquis for the last 6 months  Toprol-XL 25 mg daily, patient did have one event of A-fib with RVR, rate 130, controlled with Lopressor 5 mg IV    Recommend increasing Toprol-XL to 50 mg daily, telemetry    A-fib with RVR this morning controlled with Lopressor 5 mg IV  We will discuss lower dose Eliquis anticoagulation, benefits outweigh risks given paroxysmal A-fib          Stage 3b chronic kidney disease [facility])  Assessment & Plan  Lab Results   Component Value Date    EGFR 44 06/20/2023    EGFR 44 06/19/2023    EGFR 42 01/03/2023    CREATININE 1 44 (H) 06/20/2023    CREATININE 1 45 (H) 06/19/2023    CREATININE 1 51 (H) 01/03/2023     Creatinine baseline around 1 4-1 6  Currently at baseline    Benign prostatic hyperplasia without lower urinary tract symptoms  Assessment & Plan  Continue Flomax    Chronic idiopathic pulmonary fibrosis [facility])  Assessment & Plan  Patient presents with acute hypoxic respiratory failure in setting of possible idiopathic pulmonary fibrosis  Patient admits to having increased shortness of breath with increased cough no sputum production    CT chest in May 2023 showed the following: Interstitial lung fibrosis  The distribution is somewhat atypical for UIP given lack of a clear apical basal gradient, and honeycombing in the anterior lung fields, and findings suggestive of air trapping  Admission chest x-ray official read pending but noted for diffuse parenchymal disease  Currently on 3 L oxygen  Noted to have diffuse crackles on exam which I suspect is secondary to pulmonary fibrosis    Status post ceftriaxone and azithromycin in the ER  Patient's only symptoms are shortness of breath and nonproductive cough  Very low suspicion for infectious etiology given patient has no other signs and symptoms of infection, is afebrile, no leukocytosis  Procalcitonin slightly elevated  Continue azithromycin x3 days  Status post DuoNeb and Lasix 40 IV in the ER  Will give Solu-Medrol 60 mg IV once and placed on prednisone 40 mg daily for 5 days  Placed on around-the-clock Xopenex, Atrovent, and Pulmicort nebulizer treatments  We will hold further Lasix for now as patient does not appear to be overtly volume overloaded and suspect the crackles are secondary to pulmonary fibrosis and not from pulmonary edema  Continue inhaler treatments, prednisone x5 days, titrate oxygen SPO2 > 90%, monitor leukocytosis which is improving today, most likely steroid driven, procalcitonin negative  Chronic 2 L nasal cannula at nursing facility  Patient at baseline respiratory status      Orthostatic hypotension  Assessment & Plan  Continue midodrine 5 mg 3 times daily    Type 2 diabetes mellitus, with long-term current use of insulin [facility])  Assessment & Plan  Lab Results   Component Value Date    HGBA1C 7 0 (H) 12/30/2022       Recent Labs     06/19/23 2042 06/20/23  0704   POCGLU 217* 291*       Blood Sugar Average: Last 72 hrs:  (P) 254   Carb consistent diet  Insulin sliding scale, Lantus nightly    CAD (coronary artery disease)  Assessment & Plan  Continue ASA, Eliquis, Statin    Stress test 5/8: Left ventricular perfusion is abnormal   There is a partially reversible inferior lateral defect extending from base to mid area  EF is 62%  TID index quantitatively measured to be 1 5  Visually appears less cannot rule out balanced ischemia    Recommend outpatient cardiology      * Acute respiratory failure with hypoxia ([address] )  Assessment & Plan  Continue to titrate oxygen for SPO2 greater than 90%  Monitor off antibiotics, leukocytosis appears to be driven by steroids, procalcitonin negative  Known history of pulmonary fibrosis, follow-up pulmonology    Repeat chest x-ray, stable interstitial disease, no infiltrate  VTE Pharmacologic Prophylaxis:   Pharmacologic: Apixaban (Eliquis)  Mechanical VTE Prophylaxis in Place: Yes    Patient Centered Rounds: I have performed bedside rounds with nursing staff today  Discussions with Specialists or Other Care Team Provider: N/A    Education and Discussions with Family / Patient: Discussed with wife this afternoon  Time Spent for Care: 30 minutes  More than 50% of total time spent on counseling and coordination of care as described above      Current Length of Stay: 4 day(s)    Current Patient Status: Inpatient   Certification Statement: The patient will continue to require additional inpatient hospital stay due to Recurrent A-fib with RVR    Discharge Plan / Estimated Discharge Date: 24 hours    Code Status: Level 3 - DNAR and DNI    Subjective:     Patient seen and examined at bedside this morning, asymptomatic, somewhat pleasantly confused with underlying dementia  A-fib with RVR with rate up to 140 this morning, Lopressor IV control, anticoagulation to be discussed with family  Objective:   Vitals:   Temp (24hrs), Av 8 °F (36 6 °C), Min:97 6 °F (36 4 °C), Max:98 °F (36 7 °C)    Temp:  [97 6 °F (36 4 °C)-98 °F (36 7 °C)] 97 9 °F (36 6 °C)  HR:  [] 59  Resp:  [18-20] 18  BP: (135-148)/(84-99) 147/87  SpO2:  [88 %-100 %] 98 %  Body mass index is 21 45 kg/m²  Input and Output Summary (last 24 hours): Intake/Output Summary (Last 24 hours) at 2023 1411  Last data filed at 2023 1201  Gross per 24 hour   Intake 500 ml   Output 315 ml   Net 185 ml     Physical Exam:   Physical Exam  Constitutional:       General: He is not in acute distress  HENT:      Head: Normocephalic and atraumatic  Eyes:      General: No scleral icterus  Conjunctiva/sclera: Conjunctivae normal    Cardiovascular:      Rate and Rhythm: Normal rate  Rhythm irregular  Pulses: Normal pulses  Pulmonary:      Effort: Pulmonary effort is normal  No respiratory distress  Breath sounds: No wheezing or rales  Comments: Fine crackles bilaterally  Abdominal:      General: Bowel sounds are normal  There is no distension  Tenderness: There is no abdominal tenderness  Musculoskeletal:         General: No swelling  Normal range of motion  Skin:     General: Skin is warm and dry  Capillary Refill: Capillary refill takes less than 2 seconds  Neurological:      General: No focal deficit present  Mental Status: He is alert  Mental status is at baseline     Psychiatric:         Mood and Affect: Mood normal          Behavior: Behavior normal            Additional Data:   Basic Laboratory Review:   Results from last 7 days   Lab Units 23  0531 23  0628 23  0531   SODIUM mmol/L 136 135 134*   POTASSIUM mmol/L 3 8 3 9 4 7   CHLORIDE mmol/L 102 101 102   CO2 mmol/L 23 25 24   BUN mg/dL 39* 41* 42*   CREATININE mg/dL 1 41* 1 45* 1 57*   GLUCOSE RANDOM mg/dL 123 183* 286*   CALCIUM mg/dL 9 3 9 4 9 3   ALBUMIN g/dL 3 5 3 5 3 4*   ALK PHOS U/L 66 64 66   ALT U/L 14 12 11   AST U/L 13 12* 10*   EGFR ml/min/1 73sq m 46 44 40       Results from last 7 days   Lab Units 06/23/23  0531 06/22/23  0628 06/21/23  0531   WBC Thousand/uL 11 97* 11 40* 16 62*   HEMOGLOBIN g/dL 10 6* 11 0* 10 2*   HEMATOCRIT % 34 0* 34 3* 32 9*   PLATELETS Thousands/uL 405* 392* 385   NEUTROS PCT % 77* 81* 90*   LYMPHS PCT % 13* 12* 5*   MONOS PCT % 8 6 4   EOS PCT % 1 0 0   BASOS PCT % 0 0 0   NEUTROS ABS Thousands/µL 9 25* 9 26* 15 14*   LYMPHS ABS Thousands/µL 1 56 1 32 0 77   MONOS ABS Thousand/µL 0 95 0 73 0 59   EOS ABS Thousand/µL 0 06 0 02 0 00   BASOS ABS Thousands/µL 0 02 0 01 0 01         Results from last 7 days   Lab Units 06/19/23  1558   BNP pg/mL 50       Additional Labs:  Results from last 7 days   Lab Units 06/21/23  0531 06/20/23  0512 06/19/23  1558   MAGNESIUM mg/dL 1 9 1 8*  1 8*  --    PHOSPHORUS mg/dL  --  4 7* 3 2                Recent Cultures (last 7 days):   Results from last 7 days   Lab Units 06/21/23  1250 06/19/23  1746 06/19/23  1725   BLOOD CULTURE  No Growth at 24 hrs  No Growth at 24 hrs  No Growth at 72 hrs  Staphylococcus epidermidis*   GRAM STAIN RESULT   --   --  Gram positive cocci in clusters*       * I Have Reviewed All Lab Data Listed Above  * Additional Pertinent Lab Tests Reviewed:  [facility] Admission Reviewed    Imaging:  Prior imaging studies and reports reviewed    Last 24 Hours Medication List:   Current Facility-Administered Medications   Medication Dose Route Frequency Provider Last Rate   • acetaminophen  650 mg Oral Q6H PRN Nika Barton DO     • apixaban  2 5 mg Oral BID Terry Oliva DO     • aspirin  81 mg Oral Daily Nika Barton DO     • benzonatate  100 mg Oral TID PRN Jeremy Valles,      • budesonide  0 5 mg Nebulization Q12H Nika Barton DO     • busPIRone  5 mg Oral BID Nika Barton, DO     • escitalopram  10 mg Oral HS Nika Barton, DO     • formoterol  20 mcg Nebulization Q12H Nika Barton, DO     • guaiFENesin  1,200 mg Oral BID Nika Barton, DO     • insulin glargine  30 Units Subcutaneous HS Jeremy Valles, DO     • insulin lispro  2-12 Units Subcutaneous TID AC Jeremy Valles, DO     • insulin lispro  2-12 Units Subcutaneous HS Jeremy Valles, DO     • ipratropium  0 5 mg Nebulization TID Nika Barton, DO     • levalbuterol  1 25 mg Nebulization TID Nika Barton, DO     • metoprolol succinate  50 mg Oral Daily Jeremy Valles, DO     • midodrine  5 mg Oral TID AC Nika Barton, DO     • predniSONE  40 mg Oral Daily Nika Barton, DO     • senna  1 tablet Oral Daily Nika Barton DO     • tamsulosin  0 4 mg Oral Daily With Dinner Geena Eid DO       Today, Patient Was Seen By: Crystal Clemente DO

## 2023-06-23 NOTE — PLAN OF CARE
Problem: RESPIRATORY - ADULT  Goal: Achieves optimal ventilation and oxygenation  Description: INTERVENTIONS:  - Assess for changes in respiratory status  - Assess for changes in mentation and behavior  - Position to facilitate oxygenation and minimize respiratory effort  - Oxygen administered by appropriate delivery if ordered  - Initiate smoking cessation education as indicated  - Encourage broncho-pulmonary hygiene including cough, deep breathe, Incentive Spirometry  - Assess the need for suctioning and aspirate as needed  - Assess and instruct to report SOB or any respiratory difficulty  - Respiratory Therapy support as indicated  Outcome: Progressing     Problem: SAFETY ADULT  Goal: Patient will remain free of falls  Description: INTERVENTIONS:  - Educate patient/family on patient safety including physical limitations  - Instruct patient to call for assistance with activity   - Consult OT/PT to assist with strengthening/mobility   - Keep Call bell within reach  - Keep bed low and locked with side rails adjusted as appropriate  - Keep care items and personal belongings within reach  - Initiate and maintain comfort rounds  - Make Fall Risk Sign visible to staff  - Offer Toileting every 2 Hours, in advance of need  - Initiate/Maintain bed alarm  - Obtain necessary fall risk management equipment: alarm  - Apply yellow socks and bracelet for high fall risk patients  - Consider moving patient to room near nurses station  Outcome: Progressing  Goal: Maintain or return to baseline ADL function  Description: INTERVENTIONS:  -  Assess patient's ability to carry out ADLs; assess patient's baseline for ADL function and identify physical deficits which impact ability to perform ADLs (bathing, care of mouth/teeth, toileting, grooming, dressing, etc )  - Assess/evaluate cause of self-care deficits   - Assess range of motion  - Assess patient's mobility; develop plan if impaired  - Assess patient's need for assistive devices and provide as appropriate  - Encourage maximum independence but intervene and supervise when necessary  - Involve family in performance of ADLs  - Assess for home care needs following discharge   - Consider OT consult to assist with ADL evaluation and planning for discharge  - Provide patient education as appropriate  Outcome: Progressing  Goal: Maintains/Returns to pre admission functional level  Description: INTERVENTIONS:  - Perform BMAT or MOVE assessment daily    - Set and communicate daily mobility goal to care team and patient/family/caregiver  - Collaborate with rehabilitation services on mobility goals if consulted  - Perform Range of Motion 2 times a day  - Reposition patient every 2 hours    - Dangle patient 2 times a day  - Stand patient 2 times a day  - Ambulate patient 2 times a day  - Out of bed to chair 2 times a day   - Out of bed for meals 2 times a day  - Out of bed for toileting  - Record patient progress and toleration of activity level   Outcome: Progressing     Problem: Prexisting or High Potential for Compromised Skin Integrity  Goal: Skin integrity is maintained or improved  Description: INTERVENTIONS:  - Identify patients at risk for skin breakdown  - Assess and monitor skin integrity  - Assess and monitor nutrition and hydration status  - Monitor labs   - Assess for incontinence   - Turn and reposition patient  - Assist with mobility/ambulation  - Relieve pressure over bony prominences  - Avoid friction and shearing  - Provide appropriate hygiene as needed including keeping skin clean and dry  - Evaluate need for skin moisturizer/barrier cream  - Collaborate with interdisciplinary team   - Patient/family teaching  - Consider wound care consult   Outcome: Progressing     Problem: MOBILITY - ADULT  Goal: Maintain or return to baseline ADL function  Description: INTERVENTIONS:  -  Assess patient's ability to carry out ADLs; assess patient's baseline for ADL function and identify physical deficits which impact ability to perform ADLs (bathing, care of mouth/teeth, toileting, grooming, dressing, etc )  - Assess/evaluate cause of self-care deficits   - Assess range of motion  - Assess patient's mobility; develop plan if impaired  - Assess patient's need for assistive devices and provide as appropriate  - Encourage maximum independence but intervene and supervise when necessary  - Involve family in performance of ADLs  - Assess for home care needs following discharge   - Consider OT consult to assist with ADL evaluation and planning for discharge  - Provide patient education as appropriate  Outcome: Progressing  Goal: Maintains/Returns to pre admission functional level  Description: INTERVENTIONS:  - Perform BMAT or MOVE assessment daily    - Set and communicate daily mobility goal to care team and patient/family/caregiver  - Collaborate with rehabilitation services on mobility goals if consulted  - Perform Range of Motion 2 times a day  - Reposition patient every 2 hours  - Dangle patient 2 times a day  - Stand patient 2 times a day  - Ambulate patient 2 times a day  - Out of bed to chair 2 times a day   - Out of bed for meals 2 times a day  - Out of bed for toileting  - Record patient progress and toleration of activity level   Outcome: Progressing     Problem: Nutrition/Hydration-ADULT  Goal: Nutrient/Hydration intake appropriate for improving, restoring or maintaining nutritional needs  Description: Monitor and assess patient's nutrition/hydration status for malnutrition  Collaborate with interdisciplinary team and initiate plan and interventions as ordered  Monitor patient's weight and dietary intake as ordered or per policy  Utilize nutrition screening tool and intervene as necessary  Determine patient's food preferences and provide high-protein, high-caloric foods as appropriate       INTERVENTIONS:  - Monitor oral intake, urinary output, labs, and treatment plans  - Assess nutrition and hydration status and recommend course of action  - Evaluate amount of meals eaten  - Assist patient with eating if necessary   - Allow adequate time for meals  - Recommend/ encourage appropriate diets, oral nutritional supplements, and vitamin/mineral supplements  - Order, calculate, and assess calorie counts as needed  - Recommend, monitor, and adjust tube feedings and TPN/PPN based on assessed needs  - Assess need for intravenous fluids  - Provide specific nutrition/hydration education as appropriate  - Include patient/family/caregiver in decisions related to nutrition  Outcome: Progressing

## 2023-06-23 NOTE — PLAN OF CARE
Problem: RESPIRATORY - ADULT  Goal: Achieves optimal ventilation and oxygenation  Description: INTERVENTIONS:  - Assess for changes in respiratory status  - Assess for changes in mentation and behavior  - Position to facilitate oxygenation and minimize respiratory effort  - Oxygen administered by appropriate delivery if ordered  - Initiate smoking cessation education as indicated  - Encourage broncho-pulmonary hygiene including cough, deep breathe, Incentive Spirometry  - Assess the need for suctioning and aspirate as needed  - Assess and instruct to report SOB or any respiratory difficulty  - Respiratory Therapy support as indicated  Outcome: Progressing     Problem: SAFETY ADULT  Goal: Patient will remain free of falls  Description: INTERVENTIONS:  - Educate patient/family on patient safety including physical limitations  - Instruct patient to call for assistance with activity   - Consult OT/PT to assist with strengthening/mobility   - Keep Call bell within reach  - Keep bed low and locked with side rails adjusted as appropriate  - Keep care items and personal belongings within reach  - Initiate and maintain comfort rounds  - Make Fall Risk Sign visible to staff  - Offer Toileting every 2 Hours, in advance of need  - Initiate/Maintain bed alarm  - Obtain necessary fall risk management equipment:  walker  - Apply yellow socks and bracelet for high fall risk patients  - Consider moving patient to room near nurses station  Outcome: Progressing  Goal: Maintain or return to baseline ADL function  Description: INTERVENTIONS:  -  Assess patient's ability to carry out ADLs; assess patient's baseline for ADL function and identify physical deficits which impact ability to perform ADLs (bathing, care of mouth/teeth, toileting, grooming, dressing, etc )  - Assess/evaluate cause of self-care deficits   - Assess range of motion  - Assess patient's mobility; develop plan if impaired  - Assess patient's need for assistive devices and provide as appropriate  - Encourage maximum independence but intervene and supervise when necessary  - Involve family in performance of ADLs  - Assess for home care needs following discharge   - Consider OT consult to assist with ADL evaluation and planning for discharge  - Provide patient education as appropriate  Outcome: Progressing  Goal: Maintains/Returns to pre admission functional level  Description: INTERVENTIONS:  - Perform BMAT or MOVE assessment daily    - Set and communicate daily mobility goal to care team and patient/family/caregiver  - Collaborate with rehabilitation services on mobility goals if consulted  - Perform Range of Motion 3 times a day  - Reposition patient every 2 hours    - Dangle patient 3 times a day  - Stand patient 3 times a day  - Ambulate patient 3 times a day  - Out of bed to chair 3 times a day   - Out of bed for meals 3 times a day  - Out of bed for toileting  - Record patient progress and toleration of activity level   Outcome: Progressing     Problem: Prexisting or High Potential for Compromised Skin Integrity  Goal: Skin integrity is maintained or improved  Description: INTERVENTIONS:  - Identify patients at risk for skin breakdown  - Assess and monitor skin integrity  - Assess and monitor nutrition and hydration status  - Monitor labs   - Assess for incontinence   - Turn and reposition patient  - Assist with mobility/ambulation  - Relieve pressure over bony prominences  - Avoid friction and shearing  - Provide appropriate hygiene as needed including keeping skin clean and dry  - Evaluate need for skin moisturizer/barrier cream  - Collaborate with interdisciplinary team   - Patient/family teaching  - Consider wound care consult   Outcome: Progressing     Problem: MOBILITY - ADULT  Goal: Maintain or return to baseline ADL function  Description: INTERVENTIONS:  -  Assess patient's ability to carry out ADLs; assess patient's baseline for ADL function and identify physical deficits which impact ability to perform ADLs (bathing, care of mouth/teeth, toileting, grooming, dressing, etc )  - Assess/evaluate cause of self-care deficits   - Assess range of motion  - Assess patient's mobility; develop plan if impaired  - Assess patient's need for assistive devices and provide as appropriate  - Encourage maximum independence but intervene and supervise when necessary  - Involve family in performance of ADLs  - Assess for home care needs following discharge   - Consider OT consult to assist with ADL evaluation and planning for discharge  - Provide patient education as appropriate  Outcome: Progressing  Goal: Maintains/Returns to pre admission functional level  Description: INTERVENTIONS:  - Perform BMAT or MOVE assessment daily    - Set and communicate daily mobility goal to care team and patient/family/caregiver  - Collaborate with rehabilitation services on mobility goals if consulted  - Perform Range of Motion 3 times a day  - Reposition patient every 2 hours  - Dangle patient 3 times a day  - Stand patient 3 times a day  - Ambulate patient 3 times a day  - Out of bed to chair 3 times a day   - Out of bed for meals 3 times a day  - Out of bed for toileting  - Record patient progress and toleration of activity level   Outcome: Progressing     Problem: Nutrition/Hydration-ADULT  Goal: Nutrient/Hydration intake appropriate for improving, restoring or maintaining nutritional needs  Description: Monitor and assess patient's nutrition/hydration status for malnutrition  Collaborate with interdisciplinary team and initiate plan and interventions as ordered  Monitor patient's weight and dietary intake as ordered or per policy  Utilize nutrition screening tool and intervene as necessary  Determine patient's food preferences and provide high-protein, high-caloric foods as appropriate       INTERVENTIONS:  - Monitor oral intake, urinary output, labs, and treatment plans  - Assess nutrition and hydration status and recommend course of action  - Evaluate amount of meals eaten  - Assist patient with eating if necessary   - Allow adequate time for meals  - Recommend/ encourage appropriate diets, oral nutritional supplements, and vitamin/mineral supplements  - Order, calculate, and assess calorie counts as needed  - Recommend, monitor, and adjust tube feedings and TPN/PPN based on assessed needs  - Assess need for intravenous fluids  - Provide specific nutrition/hydration education as appropriate  - Include patient/family/caregiver in decisions related to nutrition  Outcome: Progressing

## 2023-06-24 VITALS
BODY MASS INDEX: 21.55 KG/M2 | WEIGHT: 162.6 LBS | HEIGHT: 73 IN | TEMPERATURE: 97.5 F | HEART RATE: 87 BPM | SYSTOLIC BLOOD PRESSURE: 124 MMHG | DIASTOLIC BLOOD PRESSURE: 84 MMHG | OXYGEN SATURATION: 95 % | RESPIRATION RATE: 19 BRPM

## 2023-06-24 LAB
ALBUMIN SERPL BCP-MCNC: 3.4 G/DL (ref 3.5–5)
ALP SERPL-CCNC: 66 U/L (ref 34–104)
ALT SERPL W P-5'-P-CCNC: 15 U/L (ref 7–52)
ANION GAP SERPL CALCULATED.3IONS-SCNC: 8 MMOL/L
AST SERPL W P-5'-P-CCNC: 13 U/L (ref 13–39)
BACTERIA BLD CULT: NORMAL
BASOPHILS # BLD AUTO: 0.03 THOUSANDS/ÂΜL (ref 0–0.1)
BASOPHILS NFR BLD AUTO: 0 % (ref 0–1)
BILIRUB SERPL-MCNC: 0.51 MG/DL (ref 0.2–1)
BUN SERPL-MCNC: 41 MG/DL (ref 5–25)
CALCIUM ALBUM COR SERPL-MCNC: 10 MG/DL (ref 8.3–10.1)
CALCIUM SERPL-MCNC: 9.5 MG/DL (ref 8.4–10.2)
CHLORIDE SERPL-SCNC: 103 MMOL/L (ref 96–108)
CO2 SERPL-SCNC: 24 MMOL/L (ref 21–32)
CREAT SERPL-MCNC: 1.51 MG/DL (ref 0.6–1.3)
EOSINOPHIL # BLD AUTO: 0.16 THOUSAND/ÂΜL (ref 0–0.61)
EOSINOPHIL NFR BLD AUTO: 1 % (ref 0–6)
ERYTHROCYTE [DISTWIDTH] IN BLOOD BY AUTOMATED COUNT: 15.3 % (ref 11.6–15.1)
GFR SERPL CREATININE-BSD FRML MDRD: 42 ML/MIN/1.73SQ M
GLUCOSE SERPL-MCNC: 100 MG/DL (ref 65–140)
GLUCOSE SERPL-MCNC: 103 MG/DL (ref 65–140)
GLUCOSE SERPL-MCNC: 191 MG/DL (ref 65–140)
HCT VFR BLD AUTO: 36 % (ref 36.5–49.3)
HGB BLD-MCNC: 11.1 G/DL (ref 12–17)
IMM GRANULOCYTES # BLD AUTO: 0.2 THOUSAND/UL (ref 0–0.2)
IMM GRANULOCYTES NFR BLD AUTO: 2 % (ref 0–2)
LYMPHOCYTES # BLD AUTO: 1.69 THOUSANDS/ÂΜL (ref 0.6–4.47)
LYMPHOCYTES NFR BLD AUTO: 15 % (ref 14–44)
MCH RBC QN AUTO: 25.2 PG (ref 26.8–34.3)
MCHC RBC AUTO-ENTMCNC: 30.8 G/DL (ref 31.4–37.4)
MCV RBC AUTO: 82 FL (ref 82–98)
MONOCYTES # BLD AUTO: 0.98 THOUSAND/ÂΜL (ref 0.17–1.22)
MONOCYTES NFR BLD AUTO: 9 % (ref 4–12)
NEUTROPHILS # BLD AUTO: 8.31 THOUSANDS/ÂΜL (ref 1.85–7.62)
NEUTS SEG NFR BLD AUTO: 73 % (ref 43–75)
NRBC BLD AUTO-RTO: 0 /100 WBCS
PLATELET # BLD AUTO: 365 THOUSANDS/UL (ref 149–390)
PMV BLD AUTO: 9.9 FL (ref 8.9–12.7)
POTASSIUM SERPL-SCNC: 4.2 MMOL/L (ref 3.5–5.3)
PROT SERPL-MCNC: 6.7 G/DL (ref 6.4–8.4)
RBC # BLD AUTO: 4.4 MILLION/UL (ref 3.88–5.62)
SODIUM SERPL-SCNC: 135 MMOL/L (ref 135–147)
WBC # BLD AUTO: 11.37 THOUSAND/UL (ref 4.31–10.16)

## 2023-06-24 PROCEDURE — 94640 AIRWAY INHALATION TREATMENT: CPT

## 2023-06-24 PROCEDURE — 99239 HOSP IP/OBS DSCHRG MGMT >30: CPT | Performed by: STUDENT IN AN ORGANIZED HEALTH CARE EDUCATION/TRAINING PROGRAM

## 2023-06-24 PROCEDURE — 85025 COMPLETE CBC W/AUTO DIFF WBC: CPT | Performed by: STUDENT IN AN ORGANIZED HEALTH CARE EDUCATION/TRAINING PROGRAM

## 2023-06-24 PROCEDURE — 94760 N-INVAS EAR/PLS OXIMETRY 1: CPT

## 2023-06-24 PROCEDURE — 80053 COMPREHEN METABOLIC PANEL: CPT | Performed by: STUDENT IN AN ORGANIZED HEALTH CARE EDUCATION/TRAINING PROGRAM

## 2023-06-24 PROCEDURE — 82948 REAGENT STRIP/BLOOD GLUCOSE: CPT

## 2023-06-24 RX ORDER — METOPROLOL SUCCINATE 50 MG/1
50 TABLET, EXTENDED RELEASE ORAL DAILY
Qty: 30 TABLET | Refills: 0
Start: 2023-06-25

## 2023-06-24 RX ADMIN — APIXABAN 2.5 MG: 2.5 TABLET, FILM COATED ORAL at 08:14

## 2023-06-24 RX ADMIN — LEVALBUTEROL HYDROCHLORIDE 1.25 MG: 1.25 SOLUTION RESPIRATORY (INHALATION) at 13:37

## 2023-06-24 RX ADMIN — METOPROLOL SUCCINATE 50 MG: 50 TABLET, EXTENDED RELEASE ORAL at 08:14

## 2023-06-24 RX ADMIN — IPRATROPIUM BROMIDE 0.5 MG: 0.5 SOLUTION RESPIRATORY (INHALATION) at 13:37

## 2023-06-24 RX ADMIN — FORMOTEROL FUMARATE DIHYDRATE 20 MCG: 20 SOLUTION RESPIRATORY (INHALATION) at 07:33

## 2023-06-24 RX ADMIN — ASPIRIN 81 MG CHEWABLE TABLET 81 MG: 81 TABLET CHEWABLE at 08:14

## 2023-06-24 RX ADMIN — LEVALBUTEROL HYDROCHLORIDE 1.25 MG: 1.25 SOLUTION RESPIRATORY (INHALATION) at 07:33

## 2023-06-24 RX ADMIN — GUAIFENESIN 1200 MG: 600 TABLET, EXTENDED RELEASE ORAL at 08:13

## 2023-06-24 RX ADMIN — SENNOSIDES 8.6 MG: 8.6 TABLET, FILM COATED ORAL at 08:14

## 2023-06-24 RX ADMIN — BUSPIRONE HYDROCHLORIDE 5 MG: 5 TABLET ORAL at 08:14

## 2023-06-24 RX ADMIN — IPRATROPIUM BROMIDE 0.5 MG: 0.5 SOLUTION RESPIRATORY (INHALATION) at 07:33

## 2023-06-24 RX ADMIN — INSULIN LISPRO 2 UNITS: 100 INJECTION, SOLUTION INTRAVENOUS; SUBCUTANEOUS at 11:28

## 2023-06-24 RX ADMIN — PREDNISONE 40 MG: 20 TABLET ORAL at 08:14

## 2023-06-24 RX ADMIN — BUDESONIDE 0.5 MG: 0.5 INHALANT ORAL at 07:33

## 2023-06-24 NOTE — PLAN OF CARE
Problem: RESPIRATORY - ADULT  Goal: Achieves optimal ventilation and oxygenation  Description: INTERVENTIONS:  - Assess for changes in respiratory status  - Assess for changes in mentation and behavior  - Position to facilitate oxygenation and minimize respiratory effort  - Oxygen administered by appropriate delivery if ordered  - Initiate smoking cessation education as indicated  - Encourage broncho-pulmonary hygiene including cough, deep breathe, Incentive Spirometry  - Assess the need for suctioning and aspirate as needed  - Assess and instruct to report SOB or any respiratory difficulty  - Respiratory Therapy support as indicated  Outcome: Progressing     Problem: SAFETY ADULT  Goal: Patient will remain free of falls  Description: INTERVENTIONS:  - Educate patient/family on patient safety including physical limitations  - Instruct patient to call for assistance with activity   - Consult OT/PT to assist with strengthening/mobility   - Keep Call bell within reach  - Keep bed low and locked with side rails adjusted as appropriate  - Keep care items and personal belongings within reach  - Initiate and maintain comfort rounds  - Make Fall Risk Sign visible to staff  - Offer Toileting every 2 Hours, in advance of need  - Initiate/Maintain bed alarm  - Obtain necessary fall risk management equipment: nonskid footwear   - Apply yellow socks and bracelet for high fall risk patients  - Consider moving patient to room near nurses station  Outcome: Progressing  Goal: Maintain or return to baseline ADL function  Description: INTERVENTIONS:  -  Assess patient's ability to carry out ADLs; assess patient's baseline for ADL function and identify physical deficits which impact ability to perform ADLs (bathing, care of mouth/teeth, toileting, grooming, dressing, etc )  - Assess/evaluate cause of self-care deficits   - Assess range of motion  - Assess patient's mobility; develop plan if impaired  - Assess patient's need for assistive devices and provide as appropriate  - Encourage maximum independence but intervene and supervise when necessary  - Involve family in performance of ADLs  - Assess for home care needs following discharge   - Consider OT consult to assist with ADL evaluation and planning for discharge  - Provide patient education as appropriate  Outcome: Progressing  Goal: Maintains/Returns to pre admission functional level  Description: INTERVENTIONS:  - Perform BMAT or MOVE assessment daily    - Set and communicate daily mobility goal to care team and patient/family/caregiver  - Collaborate with rehabilitation services on mobility goals if consulted  - Perform Range of Motion 3 times a day  - Reposition patient every 2 hours    - Dangle patient 2 times a day  - Stand patient 2 times a day  - Ambulate patient 2 times a day  - Out of bed to chair 2 times a day   - Out of bed for meals 2 times a day  - Out of bed for toileting  - Record patient progress and toleration of activity level   Outcome: Progressing     Problem: Prexisting or High Potential for Compromised Skin Integrity  Goal: Skin integrity is maintained or improved  Description: INTERVENTIONS:  - Identify patients at risk for skin breakdown  - Assess and monitor skin integrity  - Assess and monitor nutrition and hydration status  - Monitor labs   - Assess for incontinence   - Turn and reposition patient  - Assist with mobility/ambulation  - Relieve pressure over bony prominences  - Avoid friction and shearing  - Provide appropriate hygiene as needed including keeping skin clean and dry  - Evaluate need for skin moisturizer/barrier cream  - Collaborate with interdisciplinary team   - Patient/family teaching  - Consider wound care consult   Outcome: Progressing     Problem: MOBILITY - ADULT  Goal: Maintain or return to baseline ADL function  Description: INTERVENTIONS:  -  Assess patient's ability to carry out ADLs; assess patient's baseline for ADL function and identify physical deficits which impact ability to perform ADLs (bathing, care of mouth/teeth, toileting, grooming, dressing, etc )  - Assess/evaluate cause of self-care deficits   - Assess range of motion  - Assess patient's mobility; develop plan if impaired  - Assess patient's need for assistive devices and provide as appropriate  - Encourage maximum independence but intervene and supervise when necessary  - Involve family in performance of ADLs  - Assess for home care needs following discharge   - Consider OT consult to assist with ADL evaluation and planning for discharge  - Provide patient education as appropriate  Outcome: Progressing  Goal: Maintains/Returns to pre admission functional level  Description: INTERVENTIONS:  - Perform BMAT or MOVE assessment daily    - Set and communicate daily mobility goal to care team and patient/family/caregiver  - Collaborate with rehabilitation services on mobility goals if consulted  - Perform Range of Motion 3 times a day  - Reposition patient every 2 hours  - Dangle patient 2 times a day  - Stand patient 2 times a day  - Ambulate patient 2 times a day  - Out of bed to chair 2 times a day   - Out of bed for meals 2 times a day  - Out of bed for toileting  - Record patient progress and toleration of activity level   Outcome: Progressing     Problem: Nutrition/Hydration-ADULT  Goal: Nutrient/Hydration intake appropriate for improving, restoring or maintaining nutritional needs  Description: Monitor and assess patient's nutrition/hydration status for malnutrition  Collaborate with interdisciplinary team and initiate plan and interventions as ordered  Monitor patient's weight and dietary intake as ordered or per policy  Utilize nutrition screening tool and intervene as necessary  Determine patient's food preferences and provide high-protein, high-caloric foods as appropriate       INTERVENTIONS:  - Monitor oral intake, urinary output, labs, and treatment plans  - Assess nutrition and hydration status and recommend course of action  - Evaluate amount of meals eaten  - Assist patient with eating if necessary   - Allow adequate time for meals  - Recommend/ encourage appropriate diets, oral nutritional supplements, and vitamin/mineral supplements  - Order, calculate, and assess calorie counts as needed  - Recommend, monitor, and adjust tube feedings and TPN/PPN based on assessed needs  - Assess need for intravenous fluids  - Provide specific nutrition/hydration education as appropriate  - Include patient/family/caregiver in decisions related to nutrition  Outcome: Progressing

## 2023-06-24 NOTE — PLAN OF CARE
Problem: RESPIRATORY - ADULT  Goal: Achieves optimal ventilation and oxygenation  Description: INTERVENTIONS:  - Assess for changes in respiratory status  - Assess for changes in mentation and behavior  - Position to facilitate oxygenation and minimize respiratory effort  - Oxygen administered by appropriate delivery if ordered  - Initiate smoking cessation education as indicated  - Encourage broncho-pulmonary hygiene including cough, deep breathe, Incentive Spirometry  - Assess the need for suctioning and aspirate as needed  - Assess and instruct to report SOB or any respiratory difficulty  - Respiratory Therapy support as indicated  Outcome: Progressing     Problem: SAFETY ADULT  Goal: Patient will remain free of falls  Description: INTERVENTIONS:  - Educate patient/family on patient safety including physical limitations  - Instruct patient to call for assistance with activity   - Consult OT/PT to assist with strengthening/mobility   - Keep Call bell within reach  - Keep bed low and locked with side rails adjusted as appropriate  - Keep care items and personal belongings within reach  - Initiate and maintain comfort rounds  - Make Fall Risk Sign visible to staff  - Offer Toileting every 2 Hours, in advance of need  - Initiate/Maintain bed alarm  - Obtain necessary fall risk management equipment: yellow socks  - Apply yellow socks and bracelet for high fall risk patients  - Consider moving patient to room near nurses station  Outcome: Progressing  Goal: Maintain or return to baseline ADL function  Description: INTERVENTIONS:  -  Assess patient's ability to carry out ADLs; assess patient's baseline for ADL function and identify physical deficits which impact ability to perform ADLs (bathing, care of mouth/teeth, toileting, grooming, dressing, etc )  - Assess/evaluate cause of self-care deficits   - Assess range of motion  - Assess patient's mobility; develop plan if impaired  - Assess patient's need for assistive devices and provide as appropriate  - Encourage maximum independence but intervene and supervise when necessary  - Involve family in performance of ADLs  - Assess for home care needs following discharge   - Consider OT consult to assist with ADL evaluation and planning for discharge  - Provide patient education as appropriate  Outcome: Progressing  Goal: Maintains/Returns to pre admission functional level  Description: INTERVENTIONS:  - Perform BMAT or MOVE assessment daily    - Set and communicate daily mobility goal to care team and patient/family/caregiver  - Collaborate with rehabilitation services on mobility goals if consulted  - Perform Range of Motion 3 times a day  - Reposition patient every 2 hours    - Dangle patient 3 times a day  - Stand patient 3 times a day  - Ambulate patient 3 times a day  - Out of bed to chair 3 times a day   - Out of bed for meals 3 times a day  - Out of bed for toileting  - Record patient progress and toleration of activity level   Outcome: Progressing     Problem: Prexisting or High Potential for Compromised Skin Integrity  Goal: Skin integrity is maintained or improved  Description: INTERVENTIONS:  - Identify patients at risk for skin breakdown  - Assess and monitor skin integrity  - Assess and monitor nutrition and hydration status  - Monitor labs   - Assess for incontinence   - Turn and reposition patient  - Assist with mobility/ambulation  - Relieve pressure over bony prominences  - Avoid friction and shearing  - Provide appropriate hygiene as needed including keeping skin clean and dry  - Evaluate need for skin moisturizer/barrier cream  - Collaborate with interdisciplinary team   - Patient/family teaching  - Consider wound care consult   Outcome: Progressing     Problem: MOBILITY - ADULT  Goal: Maintain or return to baseline ADL function  Description: INTERVENTIONS:  -  Assess patient's ability to carry out ADLs; assess patient's baseline for ADL function and identify physical deficits which impact ability to perform ADLs (bathing, care of mouth/teeth, toileting, grooming, dressing, etc )  - Assess/evaluate cause of self-care deficits   - Assess range of motion  - Assess patient's mobility; develop plan if impaired  - Assess patient's need for assistive devices and provide as appropriate  - Encourage maximum independence but intervene and supervise when necessary  - Involve family in performance of ADLs  - Assess for home care needs following discharge   - Consider OT consult to assist with ADL evaluation and planning for discharge  - Provide patient education as appropriate  Outcome: Progressing  Goal: Maintains/Returns to pre admission functional level  Description: INTERVENTIONS:  - Perform BMAT or MOVE assessment daily    - Set and communicate daily mobility goal to care team and patient/family/caregiver  - Collaborate with rehabilitation services on mobility goals if consulted  - Perform Range of Motion 3 times a day  - Reposition patient every 2 hours  - Dangle patient 3 times a day  - Stand patient 3 times a day  - Ambulate patient 3 times a day  - Out of bed to chair 3 times a day   - Out of bed for meals 3 times a day  - Out of bed for toileting  - Record patient progress and toleration of activity level   Outcome: Progressing     Problem: Nutrition/Hydration-ADULT  Goal: Nutrient/Hydration intake appropriate for improving, restoring or maintaining nutritional needs  Description: Monitor and assess patient's nutrition/hydration status for malnutrition  Collaborate with interdisciplinary team and initiate plan and interventions as ordered  Monitor patient's weight and dietary intake as ordered or per policy  Utilize nutrition screening tool and intervene as necessary  Determine patient's food preferences and provide high-protein, high-caloric foods as appropriate       INTERVENTIONS:  - Monitor oral intake, urinary output, labs, and treatment plans  - Assess nutrition and hydration status and recommend course of action  - Evaluate amount of meals eaten  - Assist patient with eating if necessary   - Allow adequate time for meals  - Recommend/ encourage appropriate diets, oral nutritional supplements, and vitamin/mineral supplements  - Order, calculate, and assess calorie counts as needed  - Recommend, monitor, and adjust tube feedings and TPN/PPN based on assessed needs  - Assess need for intravenous fluids  - Provide specific nutrition/hydration education as appropriate  - Include patient/family/caregiver in decisions related to nutrition  Outcome: Progressing

## 2023-06-24 NOTE — CASE MANAGEMENT
Case Management Discharge Planning Note    Patient name Addison Meeks  Location [address]2 [facility] 215 MRN [id number]  : 1940 Date 2023       Current Admission Date: 2023  Current Admission Diagnosis:Acute respiratory failure with hypoxia [facility])   Patient Active Problem List    Diagnosis Date Noted   • Acute respiratory failure with hypoxia ([facility] ) 2023   • Paroxysmal A-fib ([facility] ) 2023   • Moderate late onset Alzheimer's dementia ([facility] ) 2023   • Orthostatic hypotension 2023   • Postural dizziness with near syncope 2023   • Type 2 diabetes mellitus with hyperosmolarity without coma, with long-term current use of insulin ([facility] ) 2023   • Chronic idiopathic pulmonary fibrosis ([facility] ) 2023   • Benign prostatic hyperplasia without lower urinary tract symptoms 2023   • Stage 3b chronic kidney disease ([facility] ) 2023   • Vitamin D insufficiency 2023   • Cellulitis of left ankle    • Pain of left foot    • Inflammatory arthritis    • Cellulitis 2022   • CAD (coronary artery disease) 2022   • CKD (chronic kidney disease) 2022   • BPH (benign prostatic hyperplasia) 2022   • Dyslipidemia 2022   • Chronic lung disease 2022   • Type 2 diabetes mellitus, with long-term current use of insulin ([facility] ) 2022   • Essential hypertension 2022      LOS (days): 5  Geometric Mean LOS (GMLOS) (days): 5 00  Days to GMLOS:0 2     OBJECTIVE:  Risk of Unplanned Readmission Score: 19 59       Current admission status: Inpatient   Preferred Pharmacy:   [facility] & [address] [place] [address]  [id number]  Phone: [phone] Fax: [phone]    Primary Care Provider: Reema Perez    Primary Insurance: MEDICARE  Secondary Insurance: [facility]    DISCHARGE DETAILS:    Discharge planning discussed with[de-identified] Kim Valera (spouse)  Freedom of Choice: Yes  Comments - Freedom of Choice: Patient [facility] to [facility] [address] [place] [address] contacted family/caregiver?: Yes (SW spoke with wife Harriett Huertas by phone to notify of discharge plan and she is in agreement)  Were Treatment Team discharge recommendations reviewed with patient/caregiver?: Yes  Did patient/caregiver verbalize understanding of patient care needs?: N/A- going to facility  Were patient/caregiver advised of the risks associated with not following Treatment Team discharge recommendations?: Yes    Contacts  Patient Contacts: Tavo Hernandez (wife)  Relationship to Patient[de-identified] Family  Contact Method: Phone  Phone Number: [phone]  Reason/Outcome: Emergency Contact, Discharge [address]         Is the patient interested in [address] at discharge?: No    DME Referral Provided  Referral made for DME?: No    Other Referral/Resources/Interventions Provided:  Interventions: Short Term Rehab, Transportation    Would you like to participate in our [address] [place] [address]'S e service program?  : No - Declined    Treatment Team Recommendation: Short Term Rehab  Discharge Destination Plan[de-identified] Short Term Rehab  Transport at Discharge : [facility]  Dispatcher Contacted: Yes  Number/Name of Dispatcher: [facility] via [facility]  Transported by [employer] and Unit #): [facility]  ETA of Transport (Date): 06/24/23  ETA of Transport (Time): 1500         IMM Given (Date):: 06/24/23  IMM Given to[de-identified] Family (IMM reviewed by phone with wife Harriett Huertas and she verbalized acknowledgement  Wife is in agreement with discharge determination  Copy mailed to wife and copy placed in scan bin for chart )        Accepting Facility Name, [address] : [facility]  Receiving Facility/Agency Phone Number: [phone]  Facility/Agency Fax Number: [phone]     SW was notified by attending that patient is medically cleared for discharge today    SW spoke by phone with Denise Rebolledo in nursing at [facility] and facility is able to accept patient back today  Transport was requested in [facility] and BLS pickup is scheduled for 1500  SW spoke by phone with wife Andrew Hale to notify her of discharge plan and attending, nursing and facility were notified of pickup time

## 2023-06-24 NOTE — [place] UNIVERSAL TRANSFER FORM
"[place] UNIVERSAL TRANSFER FORM  (ALL ITEMS MUST BE COMPLETED)    1  TRANSFER FROM: [address]      TRANSFER TO: [facility]    2  DATE OF TRANSFER: 6/24/2023                        TIME OF TRANSFER: 1500    3  PATIENT NAME: Nell Chung,        YOB: 1940                             GENDER: male    4  LANGUAGE:   English    5  PHYSICIAN NAME:  Ramiro Painter,*                   PHONE: [phone]    6  CODE STATUS: Level 3 - DNAR and DNI        Out of Hospital DNR Attached: in hospital DNR DNI    7  :                                      :  Extended Emergency Contact Information  Primary Emergency Contact: Juliano Flannery  Address: [address] Phone: [phone]  Relation: Azeb Gage Musa Representative/Proxy:  No           Legal Guardian:  No             NAME OF:           HEALTH CARE REPRESENTATIVE/PROXY:                                         OR           LEGAL GUARDIAN, IF NOT :                                               PHONE:  (Day)           (Night)                        (Cell)    8  REASON FOR TRANSFER: (Must include brief medical history and recent changes in physical function or cognition ) continued care            V/S: /84   Pulse 102   Temp 97 5 °F (36 4 °C)   Resp 19   Ht 6' 1\" (1 854 m) Comment: per pt and wife \"I shrunk, use to be 6'2\"  Wt 73 8 kg (162 lb 9 6 oz)   SpO2 93%   BMI 21 45 kg/m²           PAIN: None    9  PRIMARY DIAGNOSIS: Acute respiratory failure with hypoxia (HCC)      Secondary Diagnosis:         Pacemaker: No      Internal Defib: No          Mental Health Diagnosis (if Applicable):    10  RESTRAINTS: No     11  RESPIRATORY NEEDS: Oxygen Device NC, and Flow rate: 2-3L/min    12  ISOLATION/PRECAUTION: None    13   ALLERGY: Patient has no known " allergies  14  SENSORY:       Vision Glasses, Hearing Good  and Speech Clear    15  SKIN CONDITION: Yes:  Commentblachable redness on b/l heels and sacrum ; sacrum bruising     16  DIET: Special (describe)diabetic, cardiac, 2gm Na    17  IV ACCESS: None    18  PERSONAL ITEMS SENT WITH PATIENT: Glasses    19  ATTACHED DOCUMENTS: MUST ATTACH CURRENT MEDICATION INFORMATION Face Sheet, MAR, PT Note and OT Note    20  AT RISK ALERTS:Falls and Pressure Ulcer        HARM TO: N/A    21  WEIGHT BEARING STATUS:         Left Leg: Limited        Right Leg: Limited    22  MENTAL STATUS:Alert  Oriented to person, disoriented to time, place, and situation     21  FUNCTION:        Walk: With Help        Transfer: With Help        Toilet: With Help        Feed: Self    24  IMMUNIZATIONS/SCREENING:     Immunization History   Administered Date(s) Administered   • COVID-19 Moderna Vac BIVALENT 12 Yr+ IM (BOOSTER ONLY) 0 5 ML 01/11/2023, 05/22/2023       25  BOWEL: Incontinent  and Date Last BM6/22    32  BLADDER: Incontinent at times    27   SENDING FACILITY CONTACT: Jose Menon                  Title: RN        Unit: 2S        Phone: [phone]          [address] (if known):        Title:        Unit:         Phone:         FORM PREFILLED BY (if applicable)       Title:       Unit:        Phone:         FORM COMPLETED BY Jose Menon       Title: MICHEAL      Phone: [phone]

## 2023-06-24 NOTE — DISCHARGE SUMMARY
INTERNAL MEDICINE  DISCHARGE SUMMARY     Radha Ryan   80 y o  male  MRN: [id number]  Room/Bed: [address]   Encounter: [id number]    Principal Problem:    Acute respiratory failure with hypoxia [facility])  Active Problems:    CAD (coronary artery disease)    Type 2 diabetes mellitus, with long-term current use of insulin (HCC)    Orthostatic hypotension    Chronic idiopathic pulmonary fibrosis (HCC)    Benign prostatic hyperplasia without lower urinary tract symptoms    Stage 3b chronic kidney disease (HCC)    Paroxysmal A-fib (HCC)    Moderate late onset Alzheimer's dementia ([address] )      Moderate late onset Alzheimer's dementia ([facility] [address] )  Assessment & Plan  On my evaluation in the ER patient was very confused  He was unable to tell me why he was in the ER and where he came from  Discussed with patient's wife who states that this is his baseline as his dementia has been progressing over the last 6 months  Discussed CODE STATUS with patient's wife who states that the patient would want to be DO NOT RESUSCITATE DO NOT INTUBATE    CODE STATUS changed to level 3 DNR/DNI    Paroxysmal A-fib (HCC)  Assessment & Plan  After discussion with family, patient has been off Eliquis for the last 6 months  Toprol-XL 25 mg daily, patient did have one event of A-fib with RVR, rate 130, controlled with Lopressor 5 mg IV    Recommend increasing Toprol-XL to 50 mg daily, telemetry    Lower dose Eliquis anticoagulation, benefits outweigh risks given paroxysmal A-fib  Had a long conversation with patient about taking this medication, watching for risk of fall          Stage 3b chronic kidney disease [facility])  Assessment & Plan  Lab Results   Component Value Date    EGFR 44 06/20/2023    EGFR 44 06/19/2023    EGFR 42 01/03/2023    CREATININE 1 44 (H) 06/20/2023    CREATININE 1 45 (H) 06/19/2023    CREATININE 1 51 (H) 01/03/2023     Creatinine baseline around 1  4-1 6  Currently at baseline    Benign prostatic hyperplasia without lower urinary tract symptoms  Assessment & Plan  Continue Flomax    Chronic idiopathic pulmonary fibrosis [facility])  Assessment & Plan  Patient presents with acute hypoxic respiratory failure in setting of possible idiopathic pulmonary fibrosis  Patient admits to having increased shortness of breath with increased cough no sputum production    CT chest in May 2023 showed the following: Interstitial lung fibrosis  The distribution is somewhat atypical for UIP given lack of a clear apical basal gradient, and honeycombing in the anterior lung fields, and findings suggestive of air trapping  Admission chest x-ray official read pending but noted for diffuse parenchymal disease  Currently on 3 L oxygen  Noted to have diffuse crackles on exam which I suspect is secondary to pulmonary fibrosis    Status post ceftriaxone and azithromycin in the ER  Patient's only symptoms are shortness of breath and nonproductive cough  Very low suspicion for infectious etiology given patient has no other signs and symptoms of infection, is afebrile, no leukocytosis  Procalcitonin slightly elevated  Continue azithromycin x3 days  Status post DuoNeb and Lasix 40 IV in the ER  Will give Solu-Medrol 60 mg IV once and placed on prednisone 40 mg daily for 5 days  Placed on around-the-clock Xopenex, Atrovent, and Pulmicort nebulizer treatments  We will hold further Lasix for now as patient does not appear to be overtly volume overloaded and suspect the crackles are secondary to pulmonary fibrosis and not from pulmonary edema  Continue inhaler treatments, prednisone x5 days, titrate oxygen SPO2 > 90%, monitor leukocytosis which is improving today, most likely steroid driven, procalcitonin negative  Chronic 2 L nasal cannula at nursing facility  Patient at baseline respiratory status      Orthostatic hypotension  Assessment & Plan  Continue midodrine 5 mg 3 times daily    Type 2 diabetes mellitus, with long-term current use of insulin [facility])  Assessment & Plan  Lab Results   Component Value Date    HGBA1C 7 0 (H) 12/30/2022       Recent Labs     06/19/23 2042 06/20/23  0704   POCGLU 217* 291*       Blood Sugar Average: Last 72 hrs:  (P) 254   Carb consistent diet  Insulin sliding scale, Lantus nightly    CAD (coronary artery disease)  Assessment & Plan  Continue ASA, Eliquis, Statin    Stress test 5/8: Left ventricular perfusion is abnormal   There is a partially reversible inferior lateral defect extending from base to mid area  EF is 62%  TID index quantitatively measured to be 1 5  Visually appears less cannot rule out balanced ischemia    Recommend outpatient cardiology      * Acute respiratory failure with hypoxia ([address] )  Assessment & Plan  Continue to titrate oxygen for SPO2 greater than 90%  Monitor off antibiotics, leukocytosis appears to be driven by steroids, procalcitonin negative  Known history of pulmonary fibrosis, follow-up pulmonology    Repeat chest x-ray, stable interstitial disease, no infiltrate  [address] COURSE     Patient is a pleasant 80-year-old male presenting secondary to acute hypoxic respiratory failure with diagnosis of idiopathic pulmonary fibrosis  Patient did not show signs of infection throughout, did receive 1 dose of ceftriaxone/azithromycin  Patient was given prednisone x5 days with improvement to baseline 2 L nasal cannula  Patient did have multiple findings of paroxysmal A-fib that has now improved with increased dosage of Toprol-XL  Electrolytes optimized prior to discharge  We had a discussion on starting low-dose Eliquis given paroxysmal A-fib, I recommend following up closely with cardiology in the outpatient setting  Patient in agreement with plan, medically stable for discharge        Discharging Physician / Practitioner: Lito Troncoso DO  PCP: Siria Casiano  Admission Date:   Admission "Orders (From admission, onward)     Ordered        06/19/23 1730  INPATIENT ADMISSION  Once                      Discharge Date: 06/24/23    Medical Problems     Resolved Problems  Date Reviewed: 6/19/2023   None         Condition at Discharge: stable     Discharge Day Visit / Exam:     Subjective: Patient seen and examined at bedside this morning, patient notes he is feeling well, denies shortness of breath, chest pain, palpitations  Vitals: Blood Pressure: 124/84 (06/24/23 1131)  Pulse: 102 (06/24/23 1131)  Temperature: 97 5 °F (36 4 °C) (06/24/23 0758)  Temp Source: Oral (06/24/23 0742)  Respirations: 19 (06/24/23 0758)  Height: 6' 1\" (185 4 cm) (per pt and wife \"I shrunk, use to be 6'2\") (06/20/23 1244)  Weight - Scale: 73 8 kg (162 lb 9 6 oz) (06/23/23 0600)  SpO2: 93 % (06/24/23 1338)  Exam:   Physical Exam  Constitutional:       General: He is not in acute distress  HENT:      Head: Normocephalic and atraumatic  Eyes:      General: No scleral icterus  Conjunctiva/sclera: Conjunctivae normal    Cardiovascular:      Rate and Rhythm: Normal rate and regular rhythm  Pulses: Normal pulses  Heart sounds: No murmur heard  Pulmonary:      Effort: Pulmonary effort is normal  No respiratory distress  Breath sounds: Normal breath sounds  No wheezing or rales  Abdominal:      General: There is no distension  Palpations: Abdomen is soft  Tenderness: There is no abdominal tenderness  There is no guarding  Musculoskeletal:         General: No swelling  Normal range of motion  Skin:     General: Skin is warm and dry  Capillary Refill: Capillary refill takes less than 2 seconds  Coloration: Skin is not jaundiced  Findings: No bruising  Neurological:      General: No focal deficit present  Mental Status: He is alert and oriented to person, place, and time  Mental status is at baseline     Psychiatric:         Mood and Affect: Mood normal          Behavior: Behavior " normal            Discharge instructions/Information to patient and family:   See after visit summary for information provided to patient and family  Provisions for Follow-Up Care:  See after visit summary for information related to follow-up care and any pertinent home health orders  Disposition:     Other [place] [facility] HE[facility] AND SURGICAL [facility] the [place] Statement:  I spent 45 minutes discharging the patient  This time was spent on the day of discharge  I had direct contact with the patient on the day of discharge  Greater than 50% of the total time was spent examining patient, answering all patient questions, arranging and discussing plan of care with patient as well as directly providing post-discharge instructions  Additional time then spent on discharge activities  Discharge Medications:  See after visit summary for reconciled discharge medications provided to patient and family        ** Please Note: This note has been constructed using a voice recognition system **

## 2023-06-26 LAB
BACTERIA BLD CULT: NORMAL
BACTERIA BLD CULT: NORMAL

## 2023-07-18 LAB — HBA1C MFR BLD HPLC: 9.4 %

## 2023-08-04 ENCOUNTER — CONSULT (OUTPATIENT)
Dept: PULMONOLOGY | Facility: MEDICAL CENTER | Age: 83
End: 2023-08-04
Payer: MEDICARE

## 2023-08-04 VITALS
HEART RATE: 99 BPM | OXYGEN SATURATION: 97 % | TEMPERATURE: 97.1 F | RESPIRATION RATE: 12 BRPM | DIASTOLIC BLOOD PRESSURE: 82 MMHG | SYSTOLIC BLOOD PRESSURE: 122 MMHG | HEIGHT: 73 IN | BODY MASS INDEX: 21.45 KG/M2

## 2023-08-04 DIAGNOSIS — J84.9 ILD (INTERSTITIAL LUNG DISEASE) (HCC): Primary | ICD-10-CM

## 2023-08-04 DIAGNOSIS — F02.B0 MODERATE LATE ONSET ALZHEIMER'S DEMENTIA WITHOUT BEHAVIORAL DISTURBANCE, PSYCHOTIC DISTURBANCE, MOOD DISTURBANCE, OR ANXIETY (HCC): ICD-10-CM

## 2023-08-04 DIAGNOSIS — J96.11 CHRONIC RESPIRATORY FAILURE WITH HYPOXIA (HCC): ICD-10-CM

## 2023-08-04 DIAGNOSIS — G30.1 MODERATE LATE ONSET ALZHEIMER'S DEMENTIA WITHOUT BEHAVIORAL DISTURBANCE, PSYCHOTIC DISTURBANCE, MOOD DISTURBANCE, OR ANXIETY (HCC): ICD-10-CM

## 2023-08-04 PROCEDURE — 99204 OFFICE O/P NEW MOD 45 MIN: CPT | Performed by: INTERNAL MEDICINE

## 2023-08-04 PROCEDURE — 94010 BREATHING CAPACITY TEST: CPT | Performed by: INTERNAL MEDICINE

## 2023-08-04 RX ORDER — PREDNISONE 5 MG/1
TABLET ORAL
Qty: 50 TABLET | Refills: 3 | Status: SHIPPED | OUTPATIENT
Start: 2023-08-04

## 2023-08-04 RX ORDER — LEVALBUTEROL INHALATION SOLUTION 1.25 MG/3ML
1.25 SOLUTION RESPIRATORY (INHALATION) 3 TIMES DAILY
COMMUNITY

## 2023-08-04 RX ORDER — FUROSEMIDE 20 MG/1
20 TABLET ORAL 2 TIMES DAILY
COMMUNITY

## 2023-08-04 RX ORDER — PREDNISONE 5 MG/1
TABLET ORAL
Qty: 50 TABLET | Refills: 3 | Status: SHIPPED | OUTPATIENT
Start: 2023-08-04 | End: 2023-08-04 | Stop reason: CLARIF

## 2023-08-04 NOTE — PROGRESS NOTES
Assessment/Plan:       Problem List Items Addressed This Visit        Respiratory    Chronic respiratory failure with hypoxia (HCC)     Pulse oximetry testing: Room air O2 saturation at rest ranged from 85 to 95%. On 3 L of oxygen at rest O2 saturation 97%    He does have oxygen desaturation at rest due to his interstitial lung disease. Recommend stay on 3 L/min nasal cannula oxygen continuous           ILD (interstitial lung disease) ([address]) - Primary     [address] does have interstitial lung disease. He did have CT scan of his chest done 526 of this year which showed evidence of significant interstitial lung disease. Likely has either idiopathic pulmonary fibrosis or nonspecific interstitial pneumonitis. His serum C-reactive protein level done December 29 was elevated at 151. We will give him a trial of prednisone 10 mg daily for 7 days then 5 mg daily to see if there is any improvement in his cough for overall breathing pattern. If his blood sugars go out of control with the prednisone can be discontinued. I will also like a serum C-reactive protein level to be rechecked in 1 to 2 weeks    He does have some faint expiratory wheezing at the lung bases so would continue neb treatments with lev albuterol 1.25 mg 3 times daily. Relevant Medications    levalbuterol (XOPENEX) 1.25 mg/3 mL nebulizer solution    predniSONE 5 mg tablet    Other Relevant Orders    POCT spirometry (Completed)    C-reactive protein       Nervous and Auditory    Moderate late onset Alzheimer's dementia [facility])     Patient is very poor historian. As per last hospitalization in June it was noted he has had progressive decline in his mental status over the last several months as per wife. Plan for follow up:      Return in about 3 months (around 11/4/2023). All questions are answered to the patient's satisfaction and understanding. He verbalizes understanding.   He is encouraged to call with any further questions or concerns. Portions of the record may have been created with voice recognition software. Occasional wrong word or "sound a like" substitutions may have occurred due to the inherent limitations of voice recognition software. Read the chart carefully and recognize, using context, where substitutions have occurred. a    Electronically Signed by Robinson Chong DO    ______________________________________________________________________    Chief Complaint: Some cough    Patient ID: Dee Law is a 80 y.o. y.o. male has a past medical history of Coronary artery disease (2018). 8/4/2023  Patient presents today for initial visit for interstitial lung disease    HPI     Dee Law is 80years old and presents today for evaluation of interstitial lung disease. He is a poor historian. He is on oxygen 3 L/min and he is presently at [facility]. He has history of interstitial lung disease. Presently not on any chronic medication for this. He also has history of coronary artery disease and has had PCI with drug-eluting stent placed and severe stenosis in mid LAD on November 2, 2018. He has diabetes mellitus type 2 insulin requiring. He has chronic kidney disease stage III and hypertension. Also has orthostatic hypotension with history of falls. He was in wheelchair when he presented today. Does take midodrine for his orthostatic hypotension. He denies any difficulty swallowing. He denies that he is having any shortness of breath. He did have some intermittent cough throughout his visit today. He was in a wheelchair I saw patient. According to last hospital stay appears patient has had declining mental status over the last several months and has suspected dementia. He is a DNR/DNI status. He does have history of paroxysmal atrial fibrillation. He is on anticoagulation with apixaban 2.5 mg twice daily.     He had recent admission to [address] [place] [address] from 6/19-6/24 this year for shortness of breath. Also had increased cough. He was given IV Solu-Medrol and then given prednisone 40 mg daily for 5 days. Chest x-ray and the scan of chest showed evidence of interstitial lung disease. At present time is not on any prednisone therapy but he does receive nebulizer treatments with lev albuterol 1.25 mg 3 times a day and is on oxygen therapy 3 L/min. 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 HENT: Negative for congestion, rhinorrhea and sore throat. Eyes: Negative for discharge and redness. Respiratory: Positive for cough. Cardiovascular: Negative for chest pain, palpitations and leg swelling. Gastrointestinal: Negative for abdominal distention, abdominal pain and nausea. Endocrine: Negative for polydipsia and polyphagia. Genitourinary: Negative for dysuria. Musculoskeletal: Negative for joint swelling and myalgias. Skin: Negative for rash. Neurological: Negative for light-headedness. Psychiatric/Behavioral: Negative for agitation. Social history: He reports that he quit smoking about 7 years ago. His smoking use included cigarettes. He has a 40.00 pack-year smoking history. He does not have any smokeless tobacco history on file. He reports that he does not drink alcohol and does not use drugs. Past surgical history:   Past Surgical History:   Procedure Laterality Date   • CORONARY STENT PLACEMENT  2018    PCI with MCKENZIE to LAD     Family history: History reviewed. No pertinent family history. There is no immunization history for the selected administration types on file for this patient.   Current Outpatient Medications   Medication Sig Dispense Refill   • acetaminophen (TYLENOL) 325 mg tablet Take 2 tablets (650 mg total) by mouth every 6 (six) hours as needed for mild pain 60 tablet 5   • apixaban (Eliquis) 2.5 mg Take 1 tablet (2.5 mg total) by mouth 2 (two) times a day 56 tablet 0   • aspirin 81 mg chewable tablet Chew 81 mg daily     • busPIRone (BUSPAR) 5 mg tablet Take 1 tablet (5 mg total) by mouth 2 (two) times a day 60 tablet 5   • cholecalciferol (VITAMIN D3) 1,000 units tablet Take 1,000 Units by mouth daily     • escitalopram (Lexapro) 10 mg tablet Take 1 tablet (10 mg total) by mouth daily at bedtime 30 tablet 5   • furosemide (LASIX) 20 mg tablet Take 20 mg by mouth 2 (two) times a day     • insulin glargine (TOUJEO) 300 units/mL CONCENTRATED U-300 injection pen (1-unit dial) Inject 30 Units under the skin daily at bedtime     • levalbuterol (XOPENEX) 1.25 mg/3 mL nebulizer solution Take 1.25 mg by nebulization 3 (three) times a day     • metoprolol succinate (TOPROL-XL) 50 mg 24 hr tablet Take 1 tablet (50 mg total) by mouth daily Do not start before June 25, 2023. 30 tablet 0   • midodrine (PROAMATINE) 5 mg tablet Take 1 tablet (5 mg total) by mouth 3 (three) times a day before meals 90 tablet 5   • omega-3-acid ethyl esters (LOVAZA) 1 g capsule Take 1 g by mouth 2 (two) times a day     • predniSONE 5 mg tablet Take 2 tablets daily for 7 days then stay on 1 tablet daily thereafter 50 tablet 3   • tamsulosin (FLOMAX) 0.4 mg Take 0.4 mg by mouth daily with dinner     • vitamin B-12 (VITAMIN B-12) 1,000 mcg tablet Take 1,000 mcg by mouth daily       No current facility-administered medications for this visit. Allergies: Patient has no known allergies. Objective:  Vitals:    08/04/23 0846   BP: 122/82   BP Location: Left arm   Patient Position: Sitting   Cuff Size: Standard   Pulse: 99   Resp: 12   Temp: (!) 97.1 °F (36.2 °C)   TempSrc: Temporal   SpO2: 97%   Height: 6' 1" (1.854 m)   Oxygen Therapy  SpO2: 97 % (on 4l cont. 74 on 3l upon arrival.)  .   Wt Readings from Last 3 Encounters:   06/23/23 73.8 kg (162 lb 9.6 oz)   05/08/23 85.3 kg (188 lb)   01/20/23 85.3 kg (188 lb)     Body mass index is 21.45 kg/m². Physical Exam  Vitals reviewed. Constitutional:       General: He is not in acute distress. Appearance: Normal appearance. He is well-developed. HENT:      Head: Normocephalic. Right Ear: External ear normal.      Left Ear: External ear normal.      Nose: Nose normal.      Mouth/Throat:      Mouth: Mucous membranes are moist.      Pharynx: Oropharynx is clear. No oropharyngeal exudate. Eyes:      Conjunctiva/sclera: Conjunctivae normal.      Pupils: Pupils are equal, round, and reactive to light. Neck:      Vascular: No JVD. Trachea: No tracheal deviation. Cardiovascular:      Rate and Rhythm: Normal rate and regular rhythm. Heart sounds: Normal heart sounds. Pulmonary:      Effort: Pulmonary effort is normal.      Comments: There are some inspiratory crackles and faint inspiratory wheezes in both lower lobes at the bases. No rhonchi  Abdominal:      General: There is no distension. Palpations: Abdomen is soft. Tenderness: There is no abdominal tenderness. There is no guarding. Musculoskeletal:      Cervical back: Neck supple. Comments: No edema, cyanosis or clubbing   Lymphadenopathy:      Cervical: No cervical adenopathy. Skin:     General: Skin is warm and dry. Findings: No rash. Neurological:      Mental Status: He is alert and oriented to person, place, and time. Psychiatric:         Behavior: Behavior normal.         Thought Content:  Thought content normal.         Lab Review:   Lab Results   Component Value Date    K 4.2 06/24/2023     06/24/2023    CO2 24 06/24/2023    BUN 41 (H) 06/24/2023    CREATININE 1.51 (H) 06/24/2023    CALCIUM 9.5 06/24/2023     Lab Results   Component Value Date    WBC 11.37 (H) 06/24/2023    HGB 11.1 (L) 06/24/2023    HCT 36.0 (L) 06/24/2023    MCV 82 06/24/2023     06/24/2023     On 12/29/22 the C-reactive protein level was elevated at 151.3    Diagnostics:  I have personally reviewed pertinent films in PACS    CT of chest without contrast done 5/26/2023 shows increased bilateral reticular subpleural thickening with some traction bronchiectasis. There is also some volume loss and some areas of honeycombing. There is some mosaic attenuation pattern likely air trapping. Nuclear stress test done on 1/5/71 showed LV systolic function to be normal at 62%. Office Spirometry Results: done today. Some difficulty performing but was able to give 1 good flow volume loop    FVC - 1.66 L    37%  FEV1 - 1.33 L   42%   FEV1/FVC% - 80%     Severe restrictive impairment    Pulse oximetry testing: Room air O2 saturation at rest ranged from 85 to 95%.   On 3 L of oxygen at rest O2 saturation 97%

## 2023-08-04 NOTE — ASSESSMENT & PLAN NOTE
Pulse oximetry testing: Room air O2 saturation at rest ranged from 85 to 95%. On 3 L of oxygen at rest O2 saturation 97%    He does have oxygen desaturation at rest due to his interstitial lung disease.   Recommend stay on 3 L/min nasal cannula oxygen continuous

## 2023-08-04 NOTE — ASSESSMENT & PLAN NOTE
Evangelist Paulino does have interstitial lung disease. He did have CT scan of his chest done 526 of this year which showed evidence of significant interstitial lung disease. Likely has either idiopathic pulmonary fibrosis or nonspecific interstitial pneumonitis. His serum C-reactive protein level done December 29 was elevated at 151. We will give him a trial of prednisone 10 mg daily for 7 days then 5 mg daily to see if there is any improvement in his cough for overall breathing pattern. If his blood sugars go out of control with the prednisone can be discontinued. I will also like a serum C-reactive protein level to be rechecked in 1 to 2 weeks    He does have some faint expiratory wheezing at the lung bases so would continue neb treatments with lev albuterol 1.25 mg 3 times daily.

## 2023-08-04 NOTE — PATIENT INSTRUCTIONS
Trial of prednisone 10 mg x 7 days then keep on maintenance dose of 5 mg daily if blood sugars do not go out of control    Keep on oxygen 3 L/min    Check serum C-reactive protein in 1 to 2 weeks and son reports my office.

## 2023-08-04 NOTE — ASSESSMENT & PLAN NOTE
Patient is very poor historian. As per last hospitalization in June it was noted he has had progressive decline in his mental status over the last several months as per wife.

## 2023-08-30 ENCOUNTER — DOCUMENTATION (OUTPATIENT)
Dept: CARDIOLOGY CLINIC | Facility: CLINIC | Age: 83
End: 2023-08-30

## 2023-08-30 NOTE — PROGRESS NOTES
Patient originally scheduled for office visit on 8/30/2023 but canceled by me as patient condition did not allow for easy transportation to office. Virtual visit was felt to be unproductive based on my review of his records. I did review nursing home notes which indicated that his recent blood pressures have been satisfactory. I did not see any record of his pulse rates. Nor was any updated ECG done since hospital discharge on 6/24/2023. I did notice that his metoprolol dose had been reduced from 50 mg daily to 12.5 mg twice daily with hold for systolic blood pressure less than 110. It is known that this patient's atrial fibrillation is of new onset and was originally documented at time of nuclear stress test on 5/8/2023 and was not present at the time of my initial consultation on this patient 6 months ago on 2/22/2023. It appears that the patient's atrial fibrillation was consistently noted during his recent hospitalization from 6/19 through 6/24/2023. I recommend the following be done to better evaluate the patient's current cardiac status:      Twelve-lead EKG for analysis of rate control  Subsequent adjustment of dose of metoprolol depending on heart rate found on EKG. Tierra Joseph.   Nadia Valencia MD

## 2023-10-31 ENCOUNTER — TELEPHONE (OUTPATIENT)
Dept: PULMONOLOGY | Facility: MEDICAL CENTER | Age: 83
End: 2023-10-31

## 2023-10-31 NOTE — TELEPHONE ENCOUNTER
LM for patient to call office back to schedule 3m f/u appointment, schedule next available. Appointment reminder mailed.

## 2023-11-13 ENCOUNTER — VBI (OUTPATIENT)
Dept: ADMINISTRATIVE | Facility: OTHER | Age: 83
End: 2023-11-13